# Patient Record
Sex: FEMALE | Race: WHITE | NOT HISPANIC OR LATINO | Employment: FULL TIME | URBAN - METROPOLITAN AREA
[De-identification: names, ages, dates, MRNs, and addresses within clinical notes are randomized per-mention and may not be internally consistent; named-entity substitution may affect disease eponyms.]

---

## 2017-01-17 ENCOUNTER — LAB VISIT (OUTPATIENT)
Dept: LAB | Facility: HOSPITAL | Age: 72
End: 2017-01-17
Attending: INTERNAL MEDICINE
Payer: COMMERCIAL

## 2017-01-17 ENCOUNTER — OFFICE VISIT (OUTPATIENT)
Dept: CARDIOLOGY | Facility: CLINIC | Age: 72
End: 2017-01-17
Payer: COMMERCIAL

## 2017-01-17 VITALS
SYSTOLIC BLOOD PRESSURE: 143 MMHG | WEIGHT: 186.19 LBS | BODY MASS INDEX: 31.79 KG/M2 | DIASTOLIC BLOOD PRESSURE: 84 MMHG | OXYGEN SATURATION: 97 % | HEART RATE: 77 BPM | HEIGHT: 64 IN

## 2017-01-17 DIAGNOSIS — I73.9 PAD (PERIPHERAL ARTERY DISEASE): ICD-10-CM

## 2017-01-17 DIAGNOSIS — I51.89 DIASTOLIC DYSFUNCTION: ICD-10-CM

## 2017-01-17 DIAGNOSIS — I25.10 CORONARY ARTERY DISEASE INVOLVING NATIVE CORONARY ARTERY OF NATIVE HEART WITHOUT ANGINA PECTORIS: ICD-10-CM

## 2017-01-17 DIAGNOSIS — Z95.2 S/P MVR (MITRAL VALVE REPLACEMENT): ICD-10-CM

## 2017-01-17 DIAGNOSIS — I25.10 CORONARY ARTERY DISEASE INVOLVING NATIVE CORONARY ARTERY OF NATIVE HEART WITHOUT ANGINA PECTORIS: Primary | ICD-10-CM

## 2017-01-17 DIAGNOSIS — E78.2 MIXED HYPERLIPIDEMIA: ICD-10-CM

## 2017-01-17 DIAGNOSIS — Z95.1 S/P CABG X 2: ICD-10-CM

## 2017-01-17 DIAGNOSIS — I10 ESSENTIAL HYPERTENSION: ICD-10-CM

## 2017-01-17 DIAGNOSIS — G47.33 OSA (OBSTRUCTIVE SLEEP APNEA): ICD-10-CM

## 2017-01-17 LAB
ANION GAP SERPL CALC-SCNC: 9 MMOL/L
BASOPHILS # BLD AUTO: 0.02 K/UL
BASOPHILS NFR BLD: 0.3 %
BUN SERPL-MCNC: 14 MG/DL
CALCIUM SERPL-MCNC: 9.6 MG/DL
CHLORIDE SERPL-SCNC: 93 MMOL/L
CO2 SERPL-SCNC: 29 MMOL/L
CREAT SERPL-MCNC: 0.9 MG/DL
DIFFERENTIAL METHOD: NORMAL
EOSINOPHIL # BLD AUTO: 0.1 K/UL
EOSINOPHIL NFR BLD: 1 %
ERYTHROCYTE [DISTWIDTH] IN BLOOD BY AUTOMATED COUNT: 12.2 %
EST. GFR  (AFRICAN AMERICAN): >60 ML/MIN/1.73 M^2
EST. GFR  (NON AFRICAN AMERICAN): >60 ML/MIN/1.73 M^2
GLUCOSE SERPL-MCNC: 104 MG/DL
HCT VFR BLD AUTO: 40.1 %
HGB BLD-MCNC: 14.3 G/DL
LYMPHOCYTES # BLD AUTO: 2 K/UL
LYMPHOCYTES NFR BLD: 26.7 %
MCH RBC QN AUTO: 31 PG
MCHC RBC AUTO-ENTMCNC: 35.7 %
MCV RBC AUTO: 87 FL
MONOCYTES # BLD AUTO: 0.9 K/UL
MONOCYTES NFR BLD: 11.8 %
NEUTROPHILS # BLD AUTO: 4.4 K/UL
NEUTROPHILS NFR BLD: 60.1 %
PLATELET # BLD AUTO: 184 K/UL
PMV BLD AUTO: 10.2 FL
POTASSIUM SERPL-SCNC: 3.9 MMOL/L
RBC # BLD AUTO: 4.62 M/UL
SODIUM SERPL-SCNC: 131 MMOL/L
WBC # BLD AUTO: 7.3 K/UL

## 2017-01-17 PROCEDURE — 1157F ADVNC CARE PLAN IN RCRD: CPT | Mod: S$GLB,,, | Performed by: INTERNAL MEDICINE

## 2017-01-17 PROCEDURE — 3077F SYST BP >= 140 MM HG: CPT | Mod: S$GLB,,, | Performed by: INTERNAL MEDICINE

## 2017-01-17 PROCEDURE — 1160F RVW MEDS BY RX/DR IN RCRD: CPT | Mod: S$GLB,,, | Performed by: INTERNAL MEDICINE

## 2017-01-17 PROCEDURE — 1159F MED LIST DOCD IN RCRD: CPT | Mod: S$GLB,,, | Performed by: INTERNAL MEDICINE

## 2017-01-17 PROCEDURE — 1126F AMNT PAIN NOTED NONE PRSNT: CPT | Mod: S$GLB,,, | Performed by: INTERNAL MEDICINE

## 2017-01-17 PROCEDURE — 36415 COLL VENOUS BLD VENIPUNCTURE: CPT

## 2017-01-17 PROCEDURE — 85025 COMPLETE CBC W/AUTO DIFF WBC: CPT

## 2017-01-17 PROCEDURE — 99999 PR PBB SHADOW E&M-EST. PATIENT-LVL III: CPT | Mod: PBBFAC,,, | Performed by: INTERNAL MEDICINE

## 2017-01-17 PROCEDURE — 99214 OFFICE O/P EST MOD 30 MIN: CPT | Mod: S$GLB,,, | Performed by: INTERNAL MEDICINE

## 2017-01-17 PROCEDURE — 3079F DIAST BP 80-89 MM HG: CPT | Mod: S$GLB,,, | Performed by: INTERNAL MEDICINE

## 2017-01-17 PROCEDURE — 80048 BASIC METABOLIC PNL TOTAL CA: CPT

## 2017-01-17 NOTE — MR AVS SNAPSHOT
[facility]  [address] [facility], [address]  [place] [address]  Phone: [phone]                  Tara Mondragon   2017 2:40 PM   Office Visit    Description:  Female : 1945   Provider:  iQng Duffy MD   Department:  [facility]           Reason for Visit     Follow-up           Diagnoses this Visit        Comments    Coronary artery disease involving native coronary artery of native heart without angina pectoris    -  Primary     Essential hypertension         PAD (peripheral artery disease)         Mixed hyperlipidemia         BMI 31.0-31.9,adult         S/P MVR (mitral valve replacement)         S/P CABG x 2         Diastolic dysfunction         DOV (obstructive sleep apnea)                To Do List           Goals (5 Years of Data)     None      Follow-Up and Disposition     Return in about 3 months (around 2017).    Follow-up and Disposition History      [facility] On Call     [facility] On Call Nurse Care Line -  Assistance  Registered nurses in the [facility] On Call Center provide clinical advisement, health education, appointment booking, and other advisory services.  Call for this free service at [phone].             Medications           Message regarding Medications     Verify the changes and/or additions to your medication regime listed below are the same as discussed with your clinician today.  If any of these changes or additions are incorrect, please notify your healthcare provider.             Verify that the below list of medications is an accurate representation of the medications you are currently taking.  If none reported, the list may be blank. If incorrect, please contact your healthcare provider. Carry this list with you in case of emergency.           Current Medications     amlodipine (NORVASC) 5 MG tablet TAKE 1 TABLET BY MOUTH EVERY DAY    aspirin (ECOTRIN) 81 MG EC tablet Take 1 tablet by mouth Daily.    atorvastatin (LIPITOR) 40 MG  "tablet TAKE 1 TABLET BY MOUTH EVERY DAY    ergocalciferol (ERGOCALCIFEROL) 50,000 unit Cap TAKE 1 CAPSULE BY MOUTH EVERY 7 DAYS    guaifenesin (MUCINEX) 600 mg 12 hr tablet Take 1,200 mg by mouth once as needed for Congestion.    hydrochlorothiazide (MICROZIDE) 12.5 mg capsule Take 1 capsule (12.5 mg total) by mouth once daily.    KLOR-CON 8 8 mEq TbSR TAKE 1 TABLET BY MOUTH EVERY DAY    metoprolol tartrate (LOPRESSOR) 25 MG tablet TAKE 1 TABLET BY MOUTH TWICE DAILY    nitroGLYCERIN (NITROSTAT) 0.4 MG SL tablet Place 1 tablet (0.4 mg total) under the tongue every 5 (five) minutes as needed for Chest pain.    potassium chloride (KLOR-CON) 8 MEQ TbSR TAKE 1 TABLET BY MOUTH DAILY    acetaminophen (TYLENOL) 325 MG tablet Take 325 mg by mouth once as needed for Pain.           Clinical Reference Information           Vital Signs - Last Recorded  Most recent update: 1/17/2017  2:51 PM by Adriane M Toussaint, LPN    BP Pulse Ht Wt SpO2 BMI    (!) 143/84 77 5' 4" (1.626 m) 84.5 kg (186 lb 3.2 oz) 97% 31.96 kg/m2      Blood Pressure          Most Recent Value    BP  (!)  143/84      Allergies as of 1/17/2017     Plavix [Clopidogrel]      Immunizations Administered on Date of Encounter - 1/17/2017     None      Orders Placed During Today's Visit     Future Labs/Procedures Expected by Expires    Basic metabolic panel  1/17/2017 3/18/2018    CBC auto differential  1/17/2017 3/18/2018    Polysomnogram (CPAP will be added if patient meets diagnostic criteria.)  As directed 1/17/2018      "

## 2017-01-17 NOTE — PROGRESS NOTES
Subjective:   Patient ID:  Tara Mondragon is a 71 y.o. female who presents for follow-up of Follow-up      Problem List Items Addressed This Visit        Cardiac    PAD (peripheral artery disease)    CAD (coronary artery disease) - Primary    Hypertension       Fluids/Electrolytes/Nutrition/GI    Hyperlipidemia    BMI 31.0-31.9,adult       Other    S/P MVR (mitral valve replacement)    S/P CABG x 2    Diastolic dysfunction          HPI: Patient is not doing well but unable to explain her symptoms. He said she feels congested and tired all the time. She said  never complained about her snoring but when kids visit they do mention it.   She  Is s/p CABG and MVR in 2013 after presentation with CHF and NSTEMI. She denies chest pain or SOB. She is still going to the gym 6 days weekly and quit smoking in 2013.     BP is controlled. No orthopnea or PND.   PAD is controlled with exercise regimen.     Review of Systems   Constitution: Negative.   HENT: Negative.    Eyes: Negative.    Cardiovascular: Negative.    Respiratory: Negative.    Endocrine: Negative.    Hematologic/Lymphatic: Negative.    Skin: Negative.    Musculoskeletal: Negative.    Gastrointestinal: Negative.    Neurological: Negative.        Patient's Medications   New Prescriptions    No medications on file   Previous Medications    ACETAMINOPHEN (TYLENOL) 325 MG TABLET    Take 325 mg by mouth once as needed for Pain.    AMLODIPINE (NORVASC) 5 MG TABLET    TAKE 1 TABLET BY MOUTH EVERY DAY    ASPIRIN (ECOTRIN) 81 MG EC TABLET    Take 1 tablet by mouth Daily.    ATORVASTATIN (LIPITOR) 40 MG TABLET    TAKE 1 TABLET BY MOUTH EVERY DAY    ERGOCALCIFEROL (ERGOCALCIFEROL) 50,000 UNIT CAP    TAKE 1 CAPSULE BY MOUTH EVERY 7 DAYS    GUAIFENESIN (MUCINEX) 600 MG 12 HR TABLET    Take 1,200 mg by mouth once as needed for Congestion.    HYDROCHLOROTHIAZIDE (MICROZIDE) 12.5 MG CAPSULE    Take 1 capsule (12.5 mg total) by mouth once daily.    KLOR-CON 8 8 MEQ TBSR     TAKE 1 TABLET BY MOUTH EVERY DAY    KLOR-CON 8 8 MEQ TBSR    TAKE 1 TABLET BY MOUTH EVERY DAY    METOPROLOL TARTRATE (LOPRESSOR) 25 MG TABLET    TAKE 1 TABLET BY MOUTH TWICE DAILY    METOPROLOL TARTRATE (LOPRESSOR) 25 MG TABLET    TAKE 1 TABLET BY MOUTH TWICE DAILY    METOPROLOL TARTRATE (LOPRESSOR) 25 MG TABLET    TAKE 1 TABLET BY MOUTH TWICE DAILY    NITROGLYCERIN (NITROSTAT) 0.4 MG SL TABLET    Place 1 tablet (0.4 mg total) under the tongue every 5 (five) minutes as needed for Chest pain.    POTASSIUM CHLORIDE (KLOR-CON) 8 MEQ TBSR    TAKE 1 TABLET BY MOUTH DAILY   Modified Medications    No medications on file   Discontinued Medications    No medications on file       Objective:   Physical Exam   Constitutional: She is oriented to person, place, and time. She appears well-developed and well-nourished. No distress.   Examination of the digits showed no clubbing or cyanosis   HENT:   Head: Normocephalic and atraumatic.   Eyes: Conjunctivae are normal. Pupils are equal, round, and reactive to light. Right eye exhibits no discharge.   Neck: Normal range of motion. Neck supple. No JVD present. No thyromegaly present.   No carotid bruits   Cardiovascular: Normal rate, regular rhythm, S1 normal, S2 normal, intact distal pulses and normal pulses.  PMI is not displaced.  Exam reveals no gallop, no friction rub and no opening snap.    Murmur heard.  Pulmonary/Chest: Effort normal and breath sounds normal. No respiratory distress. She has no wheezes. She has no rales. She exhibits no tenderness.   Abdominal: Soft. Bowel sounds are normal. She exhibits no distension and no mass. There is no tenderness. There is no guarding.   No hepatosplenomegaly   Musculoskeletal: Normal range of motion. She exhibits no edema or tenderness.   Lymphadenopathy:     She has no cervical adenopathy.   Neurological: She is alert and oriented to person, place, and time.   Skin: Skin is warm. No rash noted. She is not diaphoretic. No erythema.    Psychiatric: She has a normal mood and affect.   Nursing note and vitals reviewed.      ECGs reviewed  LABS reviewed  Imaging including Echoes reviewed    Assessment:     1. Coronary artery disease involving native coronary artery of native heart without angina pectoris    2. Essential hypertension    3. PAD (peripheral artery disease)    4. Mixed hyperlipidemia    5. BMI 31.0-31.9,adult    6. S/P MVR (mitral valve replacement)    7. S/P CABG x 2    8. Diastolic dysfunction        Plan:   CBC, BMP  Sleep study to DOV.  Continue current therapy  Continue exercise regimen  F/u in 3 months.

## 2017-01-18 ENCOUNTER — TELEPHONE (OUTPATIENT)
Dept: CARDIOLOGY | Facility: CLINIC | Age: 72
End: 2017-01-18

## 2017-01-18 RX ORDER — METOPROLOL TARTRATE 50 MG/1
50 TABLET ORAL 2 TIMES DAILY
Qty: 60 TABLET | Refills: 11 | Status: SHIPPED | OUTPATIENT
Start: 2017-01-18 | End: 2018-01-01 | Stop reason: SDUPTHER

## 2017-01-18 NOTE — TREATMENT PLAN
Called and inform patient of abnormal Labs which I think is secondary to HCTZ. Will stop HCTZ and increase metoprolol to 50 mg po bid.

## 2017-01-19 ENCOUNTER — TELEPHONE (OUTPATIENT)
Dept: CARDIOLOGY | Facility: CLINIC | Age: 72
End: 2017-01-19

## 2017-01-19 NOTE — TELEPHONE ENCOUNTER
----- Message from Marci Cruz sent at 1/18/2017 11:08 AM CST -----  Contact: self/[phone]  Patient is calling you back about some lab results.  Please advise

## 2017-01-22 DIAGNOSIS — E55.9 VITAMIN D DEFICIENCY: ICD-10-CM

## 2017-01-22 RX ORDER — ERGOCALCIFEROL 1.25 MG/1
CAPSULE ORAL
Qty: 4 CAPSULE | Refills: 0 | Status: SHIPPED | OUTPATIENT
Start: 2017-01-22 | End: 2017-02-16 | Stop reason: SDUPTHER

## 2017-01-26 ENCOUNTER — TELEPHONE (OUTPATIENT)
Dept: SLEEP MEDICINE | Facility: OTHER | Age: 72
End: 2017-01-26

## 2017-01-27 RX ORDER — ATORVASTATIN CALCIUM 40 MG/1
TABLET, FILM COATED ORAL
Qty: 90 TABLET | Refills: 0 | Status: SHIPPED | OUTPATIENT
Start: 2017-01-27 | End: 2017-04-23 | Stop reason: SDUPTHER

## 2017-01-31 ENCOUNTER — OFFICE VISIT (OUTPATIENT)
Dept: PODIATRY | Facility: CLINIC | Age: 72
End: 2017-01-31
Payer: COMMERCIAL

## 2017-01-31 VITALS
BODY MASS INDEX: 30.73 KG/M2 | HEART RATE: 46 BPM | WEIGHT: 180 LBS | HEIGHT: 64 IN | SYSTOLIC BLOOD PRESSURE: 150 MMHG | DIASTOLIC BLOOD PRESSURE: 72 MMHG

## 2017-01-31 DIAGNOSIS — L84 PRE-ULCERATIVE CALLUSES: ICD-10-CM

## 2017-01-31 DIAGNOSIS — M20.42 HAMMER TOE OF LEFT FOOT: ICD-10-CM

## 2017-01-31 DIAGNOSIS — I73.9 PVD (PERIPHERAL VASCULAR DISEASE): Primary | ICD-10-CM

## 2017-01-31 DIAGNOSIS — S91.109A WOUND, OPEN, TOE, INITIAL ENCOUNTER: ICD-10-CM

## 2017-01-31 PROCEDURE — 1159F MED LIST DOCD IN RCRD: CPT | Mod: S$GLB,,, | Performed by: PODIATRIST

## 2017-01-31 PROCEDURE — 3077F SYST BP >= 140 MM HG: CPT | Mod: S$GLB,,, | Performed by: PODIATRIST

## 2017-01-31 PROCEDURE — 99213 OFFICE O/P EST LOW 20 MIN: CPT | Mod: 25,S$GLB,, | Performed by: PODIATRIST

## 2017-01-31 PROCEDURE — 99999 PR PBB SHADOW E&M-EST. PATIENT-LVL III: CPT | Mod: PBBFAC,,, | Performed by: PODIATRIST

## 2017-01-31 PROCEDURE — 3078F DIAST BP <80 MM HG: CPT | Mod: S$GLB,,, | Performed by: PODIATRIST

## 2017-01-31 PROCEDURE — 11055 PARING/CUTG B9 HYPRKER LES 1: CPT | Mod: Q8,S$GLB,, | Performed by: PODIATRIST

## 2017-01-31 PROCEDURE — 1125F AMNT PAIN NOTED PAIN PRSNT: CPT | Mod: S$GLB,,, | Performed by: PODIATRIST

## 2017-01-31 PROCEDURE — 1160F RVW MEDS BY RX/DR IN RCRD: CPT | Mod: S$GLB,,, | Performed by: PODIATRIST

## 2017-01-31 PROCEDURE — 1157F ADVNC CARE PLAN IN RCRD: CPT | Mod: S$GLB,,, | Performed by: PODIATRIST

## 2017-01-31 RX ORDER — AMMONIUM LACTATE 12 G/100G
CREAM TOPICAL
Qty: 140 G | Refills: 5 | Status: SHIPPED | OUTPATIENT
Start: 2017-01-31

## 2017-01-31 NOTE — MR AVS SNAPSHOT
[facility]   [place] [address]  Phone: [phone]                  Tara Mondragon   2017 8:00 AM   Office Visit    Description:  Female : 1945   Provider:  Alcides Espinal DPM   Department:  [facility]           Reason for Visit     Foot Pain     Callouses           Diagnoses this Visit        Comments    PVD (peripheral vascular disease)    -  Primary     Pre-ulcerative calluses         Wound, open, toe, initial encounter         Hammer toe of left foot                To Do List           Future Appointments        Provider Department Dept Phone    2017 4:00 PM Qing Duffy MD [facility] [phone]      Goals (5 Years of Data)     None      Follow-Up and Disposition     Return if symptoms worsen or fail to improve.       These Medications        Disp Refills Start End    ammonium lactate 12 % Crea 140 g 5 2017     Apply to feet twice daily. Avoid use between toes.    Pharmacy: [facility] [address] [place] [address] [place], [address] AT [facility] #: [phone]         [facility] On Call     [facility] On Call Nurse Care Line -  Assistance  Registered nurses in the [facility] On Call Center provide clinical advisement, health education, appointment booking, and other advisory services.  Call for this free service at [phone].             Medications           Message regarding Medications     Verify the changes and/or additions to your medication regime listed below are the same as discussed with your clinician today.  If any of these changes or additions are incorrect, please notify your healthcare provider.        START taking these NEW medications        Refills    ammonium lactate 12 % Crea 5    Sig: Apply to feet twice daily. Avoid use between toes.    Class: Normal           Verify that the below list of medications is an accurate representation of the medications you are currently  "taking.  If none reported, the list may be blank. If incorrect, please contact your healthcare provider. Carry this list with you in case of emergency.           Current Medications     acetaminophen (TYLENOL) 325 MG tablet Take 325 mg by mouth once as needed for Pain.    amlodipine (NORVASC) 5 MG tablet TAKE 1 TABLET BY MOUTH EVERY DAY    aspirin (ECOTRIN) 81 MG EC tablet Take 1 tablet by mouth Daily.    atorvastatin (LIPITOR) 40 MG tablet TAKE 1 TABLET BY MOUTH EVERY DAY    ergocalciferol (ERGOCALCIFEROL) 50,000 unit Cap TAKE 1 CAPSULE BY MOUTH EVERY 7 DAYS    guaifenesin (MUCINEX) 600 mg 12 hr tablet Take 1,200 mg by mouth once as needed for Congestion.    KLOR-CON 8 8 mEq TbSR TAKE 1 TABLET BY MOUTH EVERY DAY    metoprolol tartrate (LOPRESSOR) 50 MG tablet Take 1 tablet (50 mg total) by mouth 2 (two) times daily.    potassium chloride (KLOR-CON) 8 MEQ TbSR TAKE 1 TABLET BY MOUTH DAILY    ammonium lactate 12 % Crea Apply to feet twice daily. Avoid use between toes.    nitroGLYCERIN (NITROSTAT) 0.4 MG SL tablet Place 1 tablet (0.4 mg total) under the tongue every 5 (five) minutes as needed for Chest pain.           Clinical Reference Information           Vital Signs - Last Recorded  Most recent update: 1/31/2017  7:37 AM by Guera Denny MA    BP Pulse Ht Wt BMI    (!) 150/72 (!) 46 5' 4" (1.626 m) 81.6 kg (180 lb) 30.9 kg/m2      Blood Pressure          Most Recent Value    BP  (!)  150/72      Allergies as of 1/31/2017     Plavix [Clopidogrel]      Immunizations Administered on Date of Encounter - 1/31/2017     None      "

## 2017-01-31 NOTE — PROGRESS NOTES
"Subjective:      Patient ID: Tara Mondragon is a 71 y.o. female.    Chief Complaint: Foot Pain (Left Foot) and Callouses (Left Foot)    C/O left fourth toe pain for past few months. Wears tennis shoes most of the time due to pain. Denies trauma. Followed per Cardiology for PVD.    Vitals:    01/31/17 0736   BP: (!) 150/72   Pulse: (!) 46   Weight: 81.6 kg (180 lb)   Height: 5' 4" (1.626 m)   PainSc: 10-Worst pain ever   PainLoc: Foot      Past Medical History   Diagnosis Date    Acute coronary syndrome     Cardiomyopathy     Coronary artery disease     Hypertension     Hyperthyroidism     Osteoporosis     PAD (peripheral artery disease)     Valvular regurgitation      Mild/Mod MR post MI       Past Surgical History   Procedure Laterality Date    Cholecystectomy      Tonsillectomy      Cardiac valve surgery       porcine MVR    Coronary artery bypass graft      Coronary angioplasty         Family History   Problem Relation Age of Onset    Heart failure Mother     Hypothyroidism Mother     Liver disease Father     Hypertension Sister     Hypothyroidism Sister     Hypertension Brother     Asthma Daughter        Social History     Social History    Marital status:      Spouse name: N/A    Number of children: N/A    Years of education: N/A     Social History Main Topics    Smoking status: Former Smoker     Packs/day: 1.00     Years: 50.00     Quit date: 2/3/2013    Smokeless tobacco: None    Alcohol use No    Drug use: No    Sexual activity: Not Asked     Other Topics Concern    None     Social History Narrative       Current Outpatient Prescriptions   Medication Sig Dispense Refill    acetaminophen (TYLENOL) 325 MG tablet Take 325 mg by mouth once as needed for Pain.      amlodipine (NORVASC) 5 MG tablet TAKE 1 TABLET BY MOUTH EVERY DAY 30 tablet 11    aspirin (ECOTRIN) 81 MG EC tablet Take 1 tablet by mouth Daily.      atorvastatin (LIPITOR) 40 MG tablet TAKE 1 TABLET BY MOUTH " EVERY DAY 90 tablet 0    ergocalciferol (ERGOCALCIFEROL) 50,000 unit Cap TAKE 1 CAPSULE BY MOUTH EVERY 7 DAYS 4 capsule 0    guaifenesin (MUCINEX) 600 mg 12 hr tablet Take 1,200 mg by mouth once as needed for Congestion.      KLOR-CON 8 8 mEq TbSR TAKE 1 TABLET BY MOUTH EVERY DAY 90 tablet 0    metoprolol tartrate (LOPRESSOR) 50 MG tablet Take 1 tablet (50 mg total) by mouth 2 (two) times daily. 60 tablet 11    potassium chloride (KLOR-CON) 8 MEQ TbSR TAKE 1 TABLET BY MOUTH DAILY 90 tablet 0    ammonium lactate 12 % Crea Apply to feet twice daily. Avoid use between toes. 140 g 5    nitroGLYCERIN (NITROSTAT) 0.4 MG SL tablet Place 1 tablet (0.4 mg total) under the tongue every 5 (five) minutes as needed for Chest pain. 60 tablet 0     No current facility-administered medications for this visit.        Review of patient's allergies indicates:   Allergen Reactions    Plavix [clopidogrel] Rash         Review of Systems   Constitution: Negative for chills, fever, weakness and malaise/fatigue.   Cardiovascular: Negative for chest pain and leg swelling.   Respiratory: Negative for cough and shortness of breath.    Skin: Positive for dry skin. Negative for itching, nail changes and rash.   Musculoskeletal: Negative for back pain, joint pain, muscle cramps and muscle weakness.   Gastrointestinal: Negative for nausea and vomiting.   Neurological: Negative for numbness and paresthesias.   Psychiatric/Behavioral: Negative for altered mental status.           Objective:      Physical Exam   Constitutional: She is oriented to person, place, and time. No distress.   Cardiovascular:   Pulses:       Dorsalis pedis pulses are 1+ on the right side, and 1+ on the left side.        Posterior tibial pulses are 1+ on the right side, and 1+ on the left side.   CFT< 3 secs all toes bilateral foot, skin temp warm bilateral foot,nol hair growth bilateral lower extremity, mild non-pitting lower extremity edema bilateral.      Musculoskeletal:   + equinus that reduces with knee bent bilateral.    Reducible adductovarus contracture of left fourth toe that is more plantar flexed then surrounding toes. Adductovarus contracture fifth toe bilateral foot.    Mild pes planus foot type bilateral foot.    No pain with ROM or MMT bilateral foot.   Neurological: She is alert and oriented to person, place, and time. She has normal strength. Gait normal.   Skin: Skin is warm and dry. No ecchymosis and no rash noted. She is not diaphoretic. No cyanosis or erythema. No pallor. Nails show no clubbing.   Hyperkeratotic lesion distal lateral left fourth toe digital pulp with underlying superficial pin point wound post debridement. + scant purulent drainage without surrounding erythema or edema, no odor, does not probe or undermine.    Mild pinch callus right fifth toe.    Skin is dry bilateral foot.              Assessment:       Encounter Diagnoses   Name Primary?    PVD (peripheral vascular disease) Yes    Pre-ulcerative calluses     Wound, open, toe, initial encounter     Hammer toe of left foot          Plan:       Tara was seen today for foot pain and callouses.    Diagnoses and all orders for this visit:    PVD (peripheral vascular disease)    Pre-ulcerative calluses    Wound, open, toe, initial encounter    Hammer toe of left foot    Other orders  -     ammonium lactate 12 % Crea; Apply to feet twice daily. Avoid use between toes.      I counseled the patient on her conditions, their implications and medical management.    Discussed conservative vs surgical treatment options for painful hammertoes in detail outlining risks and benefits of each. With patient's verbal consent a sterile #15 scalpel was utilized to trim the hyperkeratotic lesion described above to healthy appearing skin noting small pin point wound. Applied bacitracin, gauze and coban. Home care instructions reviewed in detail.  Patient tolerated the procedure well without  complication and noted improvement in symptoms. No anesthesia or hemostasis necessary. No blood loss.    Will consider percutaneous flexor tenotomy left fourth toe.    RTC prn.      .

## 2017-02-09 ENCOUNTER — PATIENT MESSAGE (OUTPATIENT)
Dept: ADMINISTRATIVE | Facility: OTHER | Age: 72
End: 2017-02-09

## 2017-02-09 ENCOUNTER — TELEPHONE (OUTPATIENT)
Dept: SLEEP MEDICINE | Facility: OTHER | Age: 72
End: 2017-02-09

## 2017-02-09 NOTE — TELEPHONE ENCOUNTER
Patient canceled her sleep study for March 2nd.  She did let us know she would call when she's able to reschedule.  I sent a reminder message to schedule when able,through my [employer].

## 2017-02-16 DIAGNOSIS — E55.9 VITAMIN D DEFICIENCY: ICD-10-CM

## 2017-02-16 RX ORDER — ERGOCALCIFEROL 1.25 MG/1
CAPSULE ORAL
Qty: 4 CAPSULE | Refills: 0 | Status: SHIPPED | OUTPATIENT
Start: 2017-02-16 | End: 2017-03-16 | Stop reason: SDUPTHER

## 2017-02-20 ENCOUNTER — TELEPHONE (OUTPATIENT)
Dept: SLEEP MEDICINE | Facility: OTHER | Age: 72
End: 2017-02-20

## 2017-02-24 ENCOUNTER — TELEPHONE (OUTPATIENT)
Dept: SLEEP MEDICINE | Facility: OTHER | Age: 72
End: 2017-02-24

## 2017-02-24 RX ORDER — POTASSIUM CHLORIDE 600 MG/1
TABLET, FILM COATED, EXTENDED RELEASE ORAL
Qty: 90 TABLET | Refills: 0 | Status: SHIPPED | OUTPATIENT
Start: 2017-02-24 | End: 2017-04-18 | Stop reason: SDUPTHER

## 2017-03-02 ENCOUNTER — TELEPHONE (OUTPATIENT)
Dept: SLEEP MEDICINE | Facility: OTHER | Age: 72
End: 2017-03-02

## 2017-03-16 DIAGNOSIS — E55.9 VITAMIN D DEFICIENCY: ICD-10-CM

## 2017-03-16 RX ORDER — ERGOCALCIFEROL 1.25 MG/1
CAPSULE ORAL
Qty: 4 CAPSULE | Refills: 0 | Status: SHIPPED | OUTPATIENT
Start: 2017-03-16 | End: 2017-04-12 | Stop reason: SDUPTHER

## 2017-03-20 ENCOUNTER — TELEPHONE (OUTPATIENT)
Dept: SLEEP MEDICINE | Facility: OTHER | Age: 72
End: 2017-03-20

## 2017-03-20 NOTE — TELEPHONE ENCOUNTER
Patient not ready to schedule for sometime.  She will call us when she's ready to schedule her sleep study.

## 2017-03-27 DIAGNOSIS — I10 ESSENTIAL HYPERTENSION: ICD-10-CM

## 2017-03-27 RX ORDER — AMLODIPINE BESYLATE 5 MG/1
TABLET ORAL
Qty: 30 TABLET | Refills: 0 | Status: SHIPPED | OUTPATIENT
Start: 2017-03-27 | End: 2018-04-12 | Stop reason: SDUPTHER

## 2017-04-12 DIAGNOSIS — E55.9 VITAMIN D DEFICIENCY: ICD-10-CM

## 2017-04-12 RX ORDER — ERGOCALCIFEROL 1.25 MG/1
CAPSULE ORAL
Qty: 4 CAPSULE | Refills: 0 | Status: SHIPPED | OUTPATIENT
Start: 2017-04-12 | End: 2017-05-07 | Stop reason: SDUPTHER

## 2017-04-18 ENCOUNTER — OFFICE VISIT (OUTPATIENT)
Dept: CARDIOLOGY | Facility: CLINIC | Age: 72
End: 2017-04-18
Payer: COMMERCIAL

## 2017-04-18 VITALS
HEART RATE: 69 BPM | DIASTOLIC BLOOD PRESSURE: 76 MMHG | OXYGEN SATURATION: 98 % | HEIGHT: 64 IN | BODY MASS INDEX: 33.66 KG/M2 | WEIGHT: 197.13 LBS | SYSTOLIC BLOOD PRESSURE: 171 MMHG

## 2017-04-18 DIAGNOSIS — Z95.2 S/P MVR (MITRAL VALVE REPLACEMENT): ICD-10-CM

## 2017-04-18 DIAGNOSIS — E78.2 MIXED HYPERLIPIDEMIA: ICD-10-CM

## 2017-04-18 DIAGNOSIS — I10 ESSENTIAL HYPERTENSION: ICD-10-CM

## 2017-04-18 DIAGNOSIS — I25.10 CORONARY ARTERY DISEASE INVOLVING NATIVE CORONARY ARTERY OF NATIVE HEART WITHOUT ANGINA PECTORIS: Primary | ICD-10-CM

## 2017-04-18 DIAGNOSIS — Z95.1 S/P CABG X 2: ICD-10-CM

## 2017-04-18 DIAGNOSIS — I51.89 DIASTOLIC DYSFUNCTION: ICD-10-CM

## 2017-04-18 PROCEDURE — 3077F SYST BP >= 140 MM HG: CPT | Mod: S$GLB,,, | Performed by: INTERNAL MEDICINE

## 2017-04-18 PROCEDURE — 1126F AMNT PAIN NOTED NONE PRSNT: CPT | Mod: S$GLB,,, | Performed by: INTERNAL MEDICINE

## 2017-04-18 PROCEDURE — 1160F RVW MEDS BY RX/DR IN RCRD: CPT | Mod: S$GLB,,, | Performed by: INTERNAL MEDICINE

## 2017-04-18 PROCEDURE — 3078F DIAST BP <80 MM HG: CPT | Mod: S$GLB,,, | Performed by: INTERNAL MEDICINE

## 2017-04-18 PROCEDURE — 99214 OFFICE O/P EST MOD 30 MIN: CPT | Mod: S$GLB,,, | Performed by: INTERNAL MEDICINE

## 2017-04-18 PROCEDURE — 1159F MED LIST DOCD IN RCRD: CPT | Mod: S$GLB,,, | Performed by: INTERNAL MEDICINE

## 2017-04-18 PROCEDURE — 99999 PR PBB SHADOW E&M-EST. PATIENT-LVL III: CPT | Mod: PBBFAC,,, | Performed by: INTERNAL MEDICINE

## 2017-04-18 PROCEDURE — 1157F ADVNC CARE PLAN IN RCRD: CPT | Mod: 8P,S$GLB,, | Performed by: INTERNAL MEDICINE

## 2017-04-18 RX ORDER — SPIRONOLACTONE 25 MG/1
25 TABLET ORAL DAILY
Qty: 30 TABLET | Refills: 11 | Status: SHIPPED | OUTPATIENT
Start: 2017-04-18 | End: 2018-04-02 | Stop reason: SDUPTHER

## 2017-04-18 NOTE — PROGRESS NOTES
Subjective:   Patient ID:  Tara Mondragon is a 71 y.o. female who presents for follow-up of Foot Swelling      Problem List Items Addressed This Visit        Cardiac    CAD (coronary artery disease) - Primary    Hypertension       Fluids/Electrolytes/Nutrition/GI    Hyperlipidemia       Other    S/P MVR (mitral valve replacement)    S/P CABG x 2    Diastolic dysfunction          HPI: Patient is not doing well with no acute complaints. She has gained weight since previous visit and BP is elevated. She is compliant with medications. HCTZ was discontinue secondary to hyponatremia. She is now complaining of leg swelling. She is also on norvasc.     No chest pain or dyspnea.     She  Is s/p CABG and MVR in 2013 after presentation with CHF and NSTEMI. She is still going to the gym 6 days weekly and quit smoking in 2013.     No orthopnea or PND    PAD is controlled with exercise regimen.     Review of Systems   Constitution: Negative.   HENT: Negative.    Eyes: Negative.    Cardiovascular: Negative.    Respiratory: Negative.    Endocrine: Negative.    Hematologic/Lymphatic: Negative.    Skin: Negative.    Musculoskeletal: Negative.    Gastrointestinal: Negative.    Neurological: Negative.        Patient's Medications   New Prescriptions    No medications on file   Previous Medications    ACETAMINOPHEN (TYLENOL) 325 MG TABLET    Take 325 mg by mouth once as needed for Pain.    AMLODIPINE (NORVASC) 5 MG TABLET    TAKE 1 TABLET BY MOUTH EVERY DAY    AMMONIUM LACTATE 12 % CREA    Apply to feet twice daily. Avoid use between toes.    ASPIRIN (ECOTRIN) 81 MG EC TABLET    Take 1 tablet by mouth Daily.    ATORVASTATIN (LIPITOR) 40 MG TABLET    TAKE 1 TABLET BY MOUTH EVERY DAY    ERGOCALCIFEROL (ERGOCALCIFEROL) 50,000 UNIT CAP    TAKE 1 CAPSULE BY MOUTH EVERY 7 DAYS    GUAIFENESIN (MUCINEX) 600 MG 12 HR TABLET    Take 1,200 mg by mouth once as needed for Congestion.    METOPROLOL TARTRATE (LOPRESSOR) 50 MG TABLET    Take 1 tablet  (50 mg total) by mouth 2 (two) times daily.    NITROGLYCERIN (NITROSTAT) 0.4 MG SL TABLET    Place 1 tablet (0.4 mg total) under the tongue every 5 (five) minutes as needed for Chest pain.    POTASSIUM CHLORIDE (KLOR-CON) 8 MEQ TBSR    TAKE 1 TABLET BY MOUTH DAILY   Modified Medications    No medications on file   Discontinued Medications    KLOR-CON 8 8 MEQ TBSR    TAKE 1 TABLET BY MOUTH EVERY DAY    KLOR-CON 8 8 MEQ TBSR    TAKE 1 TABLET BY MOUTH EVERY DAY       Objective:   Physical Exam   Constitutional: She is oriented to person, place, and time. She appears well-developed and well-nourished. No distress.   Examination of the digits showed no clubbing or cyanosis   HENT:   Head: Normocephalic and atraumatic.   Eyes: Conjunctivae are normal. Pupils are equal, round, and reactive to light. Right eye exhibits no discharge.   Neck: Normal range of motion. Neck supple. No JVD present. No thyromegaly present.   No carotid bruits   Cardiovascular: Normal rate, regular rhythm, S1 normal, S2 normal, intact distal pulses and normal pulses.  PMI is not displaced.  Exam reveals no gallop, no friction rub and no opening snap.    Murmur heard.  Pulmonary/Chest: Effort normal and breath sounds normal. No respiratory distress. She has no wheezes. She has no rales. She exhibits no tenderness.   Abdominal: Soft. Bowel sounds are normal. She exhibits no distension and no mass. There is no tenderness. There is no guarding.   No hepatosplenomegaly   Musculoskeletal: Normal range of motion. She exhibits no edema or tenderness.   Lymphadenopathy:     She has no cervical adenopathy.   Neurological: She is alert and oriented to person, place, and time.   Skin: Skin is warm. No rash noted. She is not diaphoretic. No erythema.   Psychiatric: She has a normal mood and affect.   Nursing note and vitals reviewed.      ECGs reviewed  LABS reviewed  Imaging including Echoes reviewed    Assessment:     1. Coronary artery disease involving  native coronary artery of native heart without angina pectoris    2. Diastolic dysfunction    3. S/P CABG x 2    4. S/P MVR (mitral valve replacement)    5. Mixed hyperlipidemia    6. Essential hypertension        Plan:     Start aldactone 25 mg po daily for HTN and mild leg edema from norvasc  Continue current therapy  Continue exercise regimen  F/u in 3 months. BMP on f/u

## 2017-04-18 NOTE — MR AVS SNAPSHOT
[facility]  [address], [address]  [place] [address]  Phone: [phone]                  Tara Mondragon   2017 4:00 PM   Office Visit    Description:  Female : 1945   Provider:  Qing Duffy MD   Department:  [facility]           Reason for Visit     Foot Swelling           Diagnoses this Visit        Comments    Coronary artery disease involving native coronary artery of native heart without angina pectoris    -  Primary     Diastolic dysfunction         S/P CABG x 2         S/P MVR (mitral valve replacement)         Mixed hyperlipidemia         Essential hypertension                To Do List           Goals (5 Years of Data)     None       These Medications        Disp Refills Start End    spironolactone (ALDACTONE) 25 MG tablet 30 tablet 11 2017    Take 1 tablet (25 mg total) by mouth once daily. - Oral    Pharmacy: [facility] [address] [place] [address] ESTELA CHRIS [address] AT [facility] #: [phone]         [facility]     [facility] Nurse Care Line -  Assistance  Unless otherwise directed by your provider, please contact [facility], our nurse care line that is available for  assistance.     Registered nurses in the [facility] provide: appointment scheduling, clinical advisement, health education, and other advisory services.  Call: [phone] (toll free)               Medications           Message regarding Medications     Verify the changes and/or additions to your medication regime listed below are the same as discussed with your clinician today.  If any of these changes or additions are incorrect, please notify your healthcare provider.        START taking these NEW medications        Refills    spironolactone (ALDACTONE) 25 MG tablet 11    Sig: Take 1 tablet (25 mg total) by mouth once daily.    Class: Normal    Route: Oral           Verify that the below list  "of medications is an accurate representation of the medications you are currently taking.  If none reported, the list may be blank. If incorrect, please contact your healthcare provider. Carry this list with you in case of emergency.           Current Medications     acetaminophen (TYLENOL) 325 MG tablet Take 325 mg by mouth once as needed for Pain.    amlodipine (NORVASC) 5 MG tablet TAKE 1 TABLET BY MOUTH EVERY DAY    ammonium lactate 12 % Crea Apply to feet twice daily. Avoid use between toes.    aspirin (ECOTRIN) 81 MG EC tablet Take 1 tablet by mouth Daily.    atorvastatin (LIPITOR) 40 MG tablet TAKE 1 TABLET BY MOUTH EVERY DAY    ergocalciferol (ERGOCALCIFEROL) 50,000 unit Cap TAKE 1 CAPSULE BY MOUTH EVERY 7 DAYS    guaifenesin (MUCINEX) 600 mg 12 hr tablet Take 1,200 mg by mouth once as needed for Congestion.    metoprolol tartrate (LOPRESSOR) 50 MG tablet Take 1 tablet (50 mg total) by mouth 2 (two) times daily.    nitroGLYCERIN (NITROSTAT) 0.4 MG SL tablet Place 1 tablet (0.4 mg total) under the tongue every 5 (five) minutes as needed for Chest pain.    potassium chloride (KLOR-CON) 8 MEQ TbSR TAKE 1 TABLET BY MOUTH DAILY    spironolactone (ALDACTONE) 25 MG tablet Take 1 tablet (25 mg total) by mouth once daily.           Clinical Reference Information           Your Vitals Were     BP Pulse Height Weight SpO2 BMI    171/76 (BP Location: Left arm, Patient Position: Sitting, BP Method: Automatic) 69 5' 4" (1.626 m) 89.4 kg (197 lb 1.6 oz) 98% 33.83 kg/m2      Blood Pressure          Most Recent Value    Right Arm BP - Sitting  161/80    Right Arm BP - Standing  149/80    Left Arm BP - Sitting  171/76    BP  (!)  171/76      Allergies as of 4/18/2017     Plavix [Clopidogrel]      Immunizations Administered on Date of Encounter - 4/18/2017     None      Language Assistance Services     ATTENTION: Language assistance services are available, free of charge. Please call [phone].      ATENCIÓN: Rachael cook " español, tiene a fregoso disposición servicios gratuitos de asistencia lingüística. Deisy al [phone].     CHRISTINE Ý: N?u b?n nói Ti?ng Vi?t, có các d?ch v? h? tr? ngôn ng? mi?n phí dành cho b?n. G?i s? [phone].         [facility] complies with applicable Federal civil rights laws and does not discriminate on the basis of race, color, national origin, age, disability, or sex.

## 2017-04-23 DIAGNOSIS — I10 ESSENTIAL HYPERTENSION: ICD-10-CM

## 2017-04-23 RX ORDER — ATORVASTATIN CALCIUM 40 MG/1
TABLET, FILM COATED ORAL
Qty: 90 TABLET | Refills: 0 | Status: SHIPPED | OUTPATIENT
Start: 2017-04-23 | End: 2017-07-17 | Stop reason: SDUPTHER

## 2017-04-23 RX ORDER — AMLODIPINE BESYLATE 5 MG/1
TABLET ORAL
Qty: 30 TABLET | Refills: 0 | Status: SHIPPED | OUTPATIENT
Start: 2017-04-23 | End: 2017-05-25 | Stop reason: SDUPTHER

## 2017-05-07 DIAGNOSIS — E55.9 VITAMIN D DEFICIENCY: ICD-10-CM

## 2017-05-07 RX ORDER — ERGOCALCIFEROL 1.25 MG/1
CAPSULE ORAL
Qty: 4 CAPSULE | Refills: 0 | Status: SHIPPED | OUTPATIENT
Start: 2017-05-07 | End: 2017-06-01 | Stop reason: SDUPTHER

## 2017-05-20 DIAGNOSIS — I10 ESSENTIAL HYPERTENSION: ICD-10-CM

## 2017-05-20 RX ORDER — AMLODIPINE BESYLATE 5 MG/1
TABLET ORAL
Qty: 30 TABLET | Refills: 0 | Status: SHIPPED | OUTPATIENT
Start: 2017-05-20 | End: 2017-05-25 | Stop reason: SDUPTHER

## 2017-05-20 RX ORDER — POTASSIUM CHLORIDE 600 MG/1
TABLET, FILM COATED, EXTENDED RELEASE ORAL
Qty: 90 TABLET | Refills: 0 | Status: SHIPPED | OUTPATIENT
Start: 2017-05-20 | End: 2017-05-25 | Stop reason: SDUPTHER

## 2017-05-25 ENCOUNTER — OFFICE VISIT (OUTPATIENT)
Dept: FAMILY MEDICINE | Facility: CLINIC | Age: 72
End: 2017-05-25
Payer: COMMERCIAL

## 2017-05-25 VITALS
OXYGEN SATURATION: 95 % | HEART RATE: 69 BPM | BODY MASS INDEX: 32.06 KG/M2 | HEIGHT: 64 IN | WEIGHT: 187.81 LBS | SYSTOLIC BLOOD PRESSURE: 124 MMHG | DIASTOLIC BLOOD PRESSURE: 68 MMHG

## 2017-05-25 DIAGNOSIS — E66.9 OBESITY (BMI 30.0-34.9): ICD-10-CM

## 2017-05-25 DIAGNOSIS — Z00.00 ROUTINE GENERAL MEDICAL EXAMINATION AT HEALTH CARE FACILITY: Primary | ICD-10-CM

## 2017-05-25 DIAGNOSIS — I73.9 PAD (PERIPHERAL ARTERY DISEASE): ICD-10-CM

## 2017-05-25 DIAGNOSIS — Z12.31 ENCOUNTER FOR SCREENING MAMMOGRAM FOR BREAST CANCER: ICD-10-CM

## 2017-05-25 DIAGNOSIS — I10 ESSENTIAL HYPERTENSION: ICD-10-CM

## 2017-05-25 DIAGNOSIS — I25.10 CORONARY ARTERY DISEASE INVOLVING NATIVE CORONARY ARTERY OF NATIVE HEART WITHOUT ANGINA PECTORIS: ICD-10-CM

## 2017-05-25 DIAGNOSIS — E78.2 MIXED HYPERLIPIDEMIA: ICD-10-CM

## 2017-05-25 DIAGNOSIS — E05.90 HYPERTHYROIDISM: ICD-10-CM

## 2017-05-25 DIAGNOSIS — I51.89 LEFT VENTRICULAR DIASTOLIC DYSFUNCTION, NYHA CLASS 3: ICD-10-CM

## 2017-05-25 DIAGNOSIS — I51.89 DIASTOLIC DYSFUNCTION: ICD-10-CM

## 2017-05-25 PROCEDURE — 99999 PR PBB SHADOW E&M-EST. PATIENT-LVL III: CPT | Mod: PBBFAC,,, | Performed by: FAMILY MEDICINE

## 2017-05-25 PROCEDURE — 99397 PER PM REEVAL EST PAT 65+ YR: CPT | Mod: S$GLB,,, | Performed by: FAMILY MEDICINE

## 2017-05-25 NOTE — PROGRESS NOTES
Subjective:       Patient ID: Tara Mondragon is a 71 y.o. female.    Chief Complaint: Hypertension    71 yr old pleasant white female with CAD, HTN, HLD, PAD, MVR s/p porcine valve replacement, Hyperthyroidism presents today for her routine follow up and annual wellness check and lab work. C/o fatigue and sleepiness and want to check her hormone levels. No anxiety/depression.    HTN - controlled - on metoprolol and norvasc - denies any side effects    HLD - on statin - controlled - LDLCALC                  53.6*               09/30/2014          - denies any side effects    CAD/PAD/MVR/SP CABG - follows Cardiology and currently doing Cardiac Rehab    Hyperthyroidism - used to follow endocrine and off of medications - last TSh was normal    Health maintenance  -Colonoscopy due - declines  -Mammo due  -Pneumovax up to date  -prevnar UTD  -labs due      Medication Refill   Pertinent negatives include no abdominal pain, anorexia, arthralgias, change in bowel habit, chest pain, congestion, coughing, diaphoresis, fatigue, headaches, joint swelling, myalgias, nausea, neck pain, numbness, sore throat, urinary symptoms, visual change or weakness.   Hypertension   This is a chronic problem. The current episode started more than 1 month ago. The problem has been gradually improving since onset. The problem is controlled. Pertinent negatives include no chest pain, headaches, neck pain, palpitations or shortness of breath. There are no associated agents to hypertension. Risk factors for coronary artery disease include obesity and dyslipidemia. Past treatments include calcium channel blockers and diuretics. The current treatment provides moderate improvement. There are no compliance problems.  Hypertensive end-organ damage includes CAD/MI and a thyroid problem. There is no history of CVA, left ventricular hypertrophy, PVD, renovascular disease or retinopathy. There is no history of chronic renal disease, coarctation of the aorta,  hypercortisolism, hyperparathyroidism or pheochromocytoma.   Thyroid Problem   Presents for follow-up visit. Patient reports no anxiety, cold intolerance, constipation, diaphoresis, diarrhea, fatigue, heat intolerance, menstrual problem, palpitations, tremors or visual change. The symptoms have been stable. Past treatments include nothing. The treatment provided moderate relief. The following procedures have not been performed: thyroid FNA and thyroid ultrasound. Her past medical history is significant for hyperlipidemia. There is no history of diabetes. There are no known risk factors.   Hyperlipidemia   This is a chronic problem. The current episode started more than 1 year ago. The problem is controlled. Recent lipid tests were reviewed and are normal. Exacerbating diseases include obesity. She has no history of chronic renal disease, diabetes, hypothyroidism or liver disease. There are no known factors aggravating her hyperlipidemia. Pertinent negatives include no chest pain, myalgias or shortness of breath. Current antihyperlipidemic treatment includes statins. The current treatment provides significant improvement of lipids. There are no compliance problems.  Risk factors for coronary artery disease include dyslipidemia, hypertension, obesity and post-menopausal.     Review of Systems   Constitutional: Negative.  Negative for activity change, diaphoresis, fatigue and unexpected weight change.   HENT: Negative.  Negative for congestion, ear discharge, hearing loss, rhinorrhea, sore throat and voice change.    Eyes: Negative.  Negative for pain, discharge and visual disturbance.   Respiratory: Negative.  Negative for cough, chest tightness, shortness of breath and wheezing.    Cardiovascular: Negative.  Negative for chest pain and palpitations.   Gastrointestinal: Negative.  Negative for abdominal distention, abdominal pain, anal bleeding, anorexia, change in bowel habit, constipation, diarrhea and nausea.    Endocrine: Negative.  Negative for cold intolerance, heat intolerance, polydipsia and polyuria.   Genitourinary: Negative.  Negative for decreased urine volume, difficulty urinating, dysuria, frequency, menstrual problem and vaginal pain.   Musculoskeletal: Negative.  Negative for arthralgias, gait problem, joint swelling, myalgias and neck pain.   Skin: Negative.  Negative for color change, pallor and wound.   Allergic/Immunologic: Negative.  Negative for environmental allergies and immunocompromised state.   Neurological: Negative.  Negative for dizziness, tremors, seizures, speech difficulty, weakness, numbness and headaches.   Hematological: Negative.  Negative for adenopathy. Does not bruise/bleed easily.   Psychiatric/Behavioral: Negative.  Negative for agitation, confusion, decreased concentration, hallucinations, self-injury and suicidal ideas. The patient is not nervous/anxious.        PMH/PSH/FH/SH/MED/ALLERGY reviewed    Objective:       Vitals:    05/25/17 1504   BP: 124/68   Pulse: 69       Physical Exam   Constitutional: She is oriented to person, place, and time. She appears well-developed and well-nourished. No distress.   HENT:   Head: Normocephalic and atraumatic.   Right Ear: External ear normal.   Left Ear: External ear normal.   Nose: Nose normal.   Mouth/Throat: Oropharynx is clear and moist. No oropharyngeal exudate.   Eyes: Conjunctivae and EOM are normal. Pupils are equal, round, and reactive to light. Right eye exhibits no discharge. Left eye exhibits no discharge. No scleral icterus.   Neck: Normal range of motion. Neck supple. No JVD present. No tracheal deviation present. No thyromegaly present.   Cardiovascular: Normal rate, regular rhythm and intact distal pulses.  Exam reveals no gallop and no friction rub.    Murmur (2/6 systolic murmer best heard in aortic area) heard.  Pulmonary/Chest: Effort normal and breath sounds normal. No stridor. No respiratory distress. She has no  wheezes. She has no rales. She exhibits no tenderness.   Abdominal: Soft. Bowel sounds are normal. She exhibits no distension and no mass. There is no tenderness. There is no rebound and no guarding. No hernia.   Musculoskeletal: Normal range of motion. She exhibits no edema or tenderness.   Lymphadenopathy:     She has no cervical adenopathy.   Neurological: She is alert and oriented to person, place, and time. She has normal reflexes. She displays normal reflexes. No cranial nerve deficit. She exhibits normal muscle tone. Coordination normal.   Skin: Skin is warm and dry. No rash noted. She is not diaphoretic. No erythema. No pallor.   Psychiatric: She has a normal mood and affect. Her behavior is normal. Judgment and thought content normal.       Assessment:       1. Routine general medical examination at health care facility    2. Mixed hyperlipidemia    3. Essential hypertension    4. PAD (peripheral artery disease)    5. Coronary artery disease involving native coronary artery of native heart without angina pectoris    6. Hyperthyroidism    7. Diastolic dysfunction    8. Left ventricular diastolic dysfunction, NYHA class 3    9. Obesity (BMI 30.0-34.9)    10. Encounter for screening mammogram for breast cancer        Plan:       Tara was seen today for hypertension.    Diagnoses and all orders for this visit:    Routine general medical examination at health care facility    Mixed hyperlipidemia  -     CBC auto differential; Future  -     Comprehensive metabolic panel; Future  -     Lipid panel; Future  -     Vitamin D; Future    Essential hypertension  -     CBC auto differential; Future  -     Comprehensive metabolic panel; Future  -     Lipid panel; Future  -     Vitamin D; Future    PAD (peripheral artery disease)  -     CBC auto differential; Future  -     Comprehensive metabolic panel; Future  -     Lipid panel; Future  -     Vitamin D; Future    Coronary artery disease involving native coronary artery  of native heart without angina pectoris  -     CBC auto differential; Future  -     Comprehensive metabolic panel; Future  -     Lipid panel; Future  -     Vitamin D; Future    Hyperthyroidism  -     TSH; Future    Diastolic dysfunction    Left ventricular diastolic dysfunction, NYHA class 3    Obesity (BMI 30.0-34.9)    Encounter for screening mammogram for breast cancer  -     Mammo Digital Screening Bilat with Tomosynthesis CAD; Future      Wellness check  -normal exam  -labs      She declines colonoscopy        HTN/HLD/hyperthyroidism - controlled    CAD and PVD - stable - follows cardiology    Obesity - diet and exercise    Fatigue and hyperthyroidism - labs    Spent adequate time in obtaining history and explaining differentials    40 minutes spent during this visit of which greater than 50% devoted to face-face counseling and coordination of care regarding diagnosis and management plan    Return in about 1 year (around 5/25/2018), or if symptoms worsen or fail to improve.

## 2017-05-27 ENCOUNTER — HOSPITAL ENCOUNTER (OUTPATIENT)
Dept: RADIOLOGY | Facility: HOSPITAL | Age: 72
Discharge: HOME | End: 2017-05-27
Attending: FAMILY MEDICINE
Payer: COMMERCIAL

## 2017-05-27 DIAGNOSIS — Z12.31 ENCOUNTER FOR SCREENING MAMMOGRAM FOR BREAST CANCER: ICD-10-CM

## 2017-05-27 PROCEDURE — 77067 SCR MAMMO BI INCL CAD: CPT | Mod: 26,,, | Performed by: RADIOLOGY

## 2017-05-27 PROCEDURE — 77063 BREAST TOMOSYNTHESIS BI: CPT | Mod: 26,,, | Performed by: RADIOLOGY

## 2017-05-27 PROCEDURE — 77067 SCR MAMMO BI INCL CAD: CPT | Mod: TC

## 2017-06-01 DIAGNOSIS — E55.9 VITAMIN D DEFICIENCY: ICD-10-CM

## 2017-06-01 RX ORDER — ERGOCALCIFEROL 1.25 MG/1
50000 CAPSULE ORAL
Qty: 4 CAPSULE | Refills: 11 | Status: SHIPPED | OUTPATIENT
Start: 2017-06-01 | End: 2018-05-30 | Stop reason: SDUPTHER

## 2017-06-16 DIAGNOSIS — I10 ESSENTIAL HYPERTENSION: ICD-10-CM

## 2017-06-16 RX ORDER — AMLODIPINE BESYLATE 5 MG/1
TABLET ORAL
Qty: 30 TABLET | Refills: 0 | Status: SHIPPED | OUTPATIENT
Start: 2017-06-16 | End: 2017-09-22 | Stop reason: SDUPTHER

## 2017-07-16 DIAGNOSIS — I10 ESSENTIAL HYPERTENSION: ICD-10-CM

## 2017-07-16 DIAGNOSIS — I25.10 CORONARY ARTERY DISEASE INVOLVING NATIVE CORONARY ARTERY OF NATIVE HEART WITHOUT ANGINA PECTORIS: Primary | ICD-10-CM

## 2017-07-17 RX ORDER — ATORVASTATIN CALCIUM 40 MG/1
40 TABLET, FILM COATED ORAL DAILY
Qty: 90 TABLET | Refills: 3 | Status: SHIPPED | OUTPATIENT
Start: 2017-07-17 | End: 2018-07-20 | Stop reason: SDUPTHER

## 2017-07-17 RX ORDER — AMLODIPINE BESYLATE 5 MG/1
TABLET ORAL
Qty: 90 TABLET | Refills: 0 | Status: SHIPPED | OUTPATIENT
Start: 2017-07-17 | End: 2017-09-22 | Stop reason: SDUPTHER

## 2017-07-17 NOTE — TELEPHONE ENCOUNTER
----- Message from Isadora Mitchell sent at 7/17/2017  9:16 AM CDT -----  Contact: lara [phone]  The pharm. Is requesting a refill on lipitor, she states the escribed failed

## 2017-08-18 RX ORDER — POTASSIUM CHLORIDE 600 MG/1
TABLET, FILM COATED, EXTENDED RELEASE ORAL
Qty: 90 TABLET | Refills: 0 | Status: SHIPPED | OUTPATIENT
Start: 2017-08-18 | End: 2017-09-22 | Stop reason: SDUPTHER

## 2017-09-20 ENCOUNTER — TELEPHONE (OUTPATIENT)
Dept: FAMILY MEDICINE | Facility: CLINIC | Age: 72
End: 2017-09-20

## 2017-09-22 ENCOUNTER — LAB VISIT (OUTPATIENT)
Dept: LAB | Facility: HOSPITAL | Age: 72
End: 2017-09-22
Attending: FAMILY MEDICINE
Payer: COMMERCIAL

## 2017-09-22 ENCOUNTER — OFFICE VISIT (OUTPATIENT)
Dept: FAMILY MEDICINE | Facility: CLINIC | Age: 72
End: 2017-09-22
Attending: FAMILY MEDICINE
Payer: COMMERCIAL

## 2017-09-22 VITALS
DIASTOLIC BLOOD PRESSURE: 65 MMHG | BODY MASS INDEX: 32.33 KG/M2 | SYSTOLIC BLOOD PRESSURE: 134 MMHG | HEIGHT: 64 IN | HEART RATE: 59 BPM | WEIGHT: 189.38 LBS | OXYGEN SATURATION: 96 %

## 2017-09-22 DIAGNOSIS — E87.1 HYPONATREMIA: ICD-10-CM

## 2017-09-22 DIAGNOSIS — Z12.11 COLON CANCER SCREENING: ICD-10-CM

## 2017-09-22 DIAGNOSIS — I10 ESSENTIAL HYPERTENSION: ICD-10-CM

## 2017-09-22 DIAGNOSIS — N30.00 ACUTE CYSTITIS WITHOUT HEMATURIA: Primary | ICD-10-CM

## 2017-09-22 LAB
ALBUMIN SERPL BCP-MCNC: 4 G/DL
ALP SERPL-CCNC: 111 U/L
ALT SERPL W/O P-5'-P-CCNC: 23 U/L
ANION GAP SERPL CALC-SCNC: 9 MMOL/L
AST SERPL-CCNC: 22 U/L
BILIRUB SERPL-MCNC: 0.7 MG/DL
BUN SERPL-MCNC: 23 MG/DL
CALCIUM SERPL-MCNC: 9.8 MG/DL
CHLORIDE SERPL-SCNC: 96 MMOL/L
CO2 SERPL-SCNC: 26 MMOL/L
CREAT SERPL-MCNC: 1 MG/DL
EST. GFR  (AFRICAN AMERICAN): >60 ML/MIN/1.73 M^2
EST. GFR  (NON AFRICAN AMERICAN): 56 ML/MIN/1.73 M^2
GLUCOSE SERPL-MCNC: 94 MG/DL
POTASSIUM SERPL-SCNC: 5 MMOL/L
PROT SERPL-MCNC: 7.9 G/DL
SODIUM SERPL-SCNC: 131 MMOL/L

## 2017-09-22 PROCEDURE — 3075F SYST BP GE 130 - 139MM HG: CPT | Mod: S$GLB,,, | Performed by: FAMILY MEDICINE

## 2017-09-22 PROCEDURE — 3008F BODY MASS INDEX DOCD: CPT | Mod: S$GLB,,, | Performed by: FAMILY MEDICINE

## 2017-09-22 PROCEDURE — 99214 OFFICE O/P EST MOD 30 MIN: CPT | Mod: S$GLB,,, | Performed by: FAMILY MEDICINE

## 2017-09-22 PROCEDURE — 36415 COLL VENOUS BLD VENIPUNCTURE: CPT

## 2017-09-22 PROCEDURE — 3078F DIAST BP <80 MM HG: CPT | Mod: S$GLB,,, | Performed by: FAMILY MEDICINE

## 2017-09-22 PROCEDURE — 80053 COMPREHEN METABOLIC PANEL: CPT

## 2017-09-22 PROCEDURE — 99999 PR PBB SHADOW E&M-EST. PATIENT-LVL III: CPT | Mod: PBBFAC,,, | Performed by: FAMILY MEDICINE

## 2017-09-22 PROCEDURE — 1159F MED LIST DOCD IN RCRD: CPT | Mod: S$GLB,,, | Performed by: FAMILY MEDICINE

## 2017-09-22 PROCEDURE — 1126F AMNT PAIN NOTED NONE PRSNT: CPT | Mod: S$GLB,,, | Performed by: FAMILY MEDICINE

## 2017-09-22 RX ORDER — CIPROFLOXACIN 500 MG/1
500 TABLET ORAL 2 TIMES DAILY
Qty: 14 TABLET | Refills: 0 | Status: SHIPPED | OUTPATIENT
Start: 2017-09-22 | End: 2018-04-12

## 2017-09-22 NOTE — PROGRESS NOTES
Subjective:       Patient ID: Tara Mondragon is a 71 y.o. female.    Chief Complaint: Urinary Tract Infection    71 yr old pleasant white female with CAD, HTN, HLD, PAD, MVR s/p porcine valve replacement, Hyperthyroidism presents today for her routine follow up and concerns regarding urinary frequency and burning.    Urinary frequency/burning - onset 5 days ago and progressively worsening - denies any hematuria or suprapubic pain or flank pain - she also has strong smell in her urine    HTN - controlled however sodium is consistently below normal - lab repeat needed      History as below - reviewed         Urinary Tract Infection    This is a new problem. The current episode started in the past 7 days. The problem occurs every urination. The problem has been unchanged. The quality of the pain is described as burning. The pain is at a severity of 3/10. The pain is mild. She is not sexually active. There is no history of pyelonephritis. Associated symptoms include frequency. Pertinent negatives include no behavior changes, flank pain, hematuria, nausea, urgency, vomiting, bubble bath use, constipation or withholding. She has tried increased fluids for the symptoms. The treatment provided mild relief. Her past medical history is significant for hypertension. There is no history of catheterization, diabetes insipidus, genitourinary reflux, kidney stones, recurrent UTIs, a single kidney, STD or a urological procedure.     Review of Systems   Constitutional: Positive for unexpected weight change. Negative for activity change and diaphoresis.   HENT: Negative.  Negative for congestion, ear discharge, hearing loss, rhinorrhea, sore throat, trouble swallowing and voice change.    Eyes: Negative.  Negative for pain, discharge and visual disturbance.   Respiratory: Negative.  Negative for chest tightness, shortness of breath and wheezing.    Cardiovascular: Negative.  Negative for chest pain and palpitations.    Gastrointestinal: Negative.  Negative for abdominal distention, anal bleeding, blood in stool, constipation, diarrhea, nausea and vomiting.   Endocrine: Positive for polyuria. Negative for cold intolerance and polydipsia.   Genitourinary: Positive for dysuria and frequency. Negative for decreased urine volume, difficulty urinating, flank pain, hematuria, menstrual problem, urgency and vaginal pain.   Musculoskeletal: Negative.  Negative for arthralgias, gait problem, joint swelling, myalgias and neck pain.   Skin: Negative.  Negative for color change, pallor and wound.   Allergic/Immunologic: Negative.  Negative for environmental allergies and immunocompromised state.   Neurological: Negative.  Negative for dizziness, tremors, seizures, speech difficulty, weakness and headaches.   Hematological: Negative.  Negative for adenopathy. Does not bruise/bleed easily.   Psychiatric/Behavioral: Negative.  Negative for agitation, confusion, decreased concentration, dysphoric mood, hallucinations, self-injury and suicidal ideas. The patient is not nervous/anxious.        PMH/PSH/FH/SH/MED/ALLERGY reviewed    Objective:       Vitals:    09/22/17 1227   BP: 134/65   Pulse: (!) 59       Physical Exam   Constitutional: She is oriented to person, place, and time. She appears well-developed and well-nourished. No distress.   HENT:   Head: Normocephalic and atraumatic.   Right Ear: External ear normal.   Left Ear: External ear normal.   Nose: Nose normal.   Mouth/Throat: Oropharynx is clear and moist. No oropharyngeal exudate.   Eyes: Conjunctivae and EOM are normal. Pupils are equal, round, and reactive to light. Right eye exhibits no discharge. Left eye exhibits no discharge. No scleral icterus.   Neck: Normal range of motion. Neck supple. No JVD present. No tracheal deviation present. No thyromegaly present.   Cardiovascular: Normal rate, regular rhythm and intact distal pulses.  Exam reveals no gallop and no friction rub.     Murmur (2/6 systolic murmer best heard in aortic area) heard.  Pulmonary/Chest: Effort normal and breath sounds normal. No stridor. No respiratory distress. She has no wheezes. She has no rales. She exhibits no tenderness.   Abdominal: Soft. Bowel sounds are normal. She exhibits no distension and no mass. There is no tenderness. There is no rebound and no guarding. No hernia.   Musculoskeletal: Normal range of motion. She exhibits no edema or tenderness.   Lymphadenopathy:     She has no cervical adenopathy.   Neurological: She is alert and oriented to person, place, and time. She has normal reflexes. She displays normal reflexes. No cranial nerve deficit. She exhibits normal muscle tone. Coordination normal.   Skin: Skin is warm and dry. No rash noted. She is not diaphoretic. No erythema. No pallor.   Psychiatric: She has a normal mood and affect. Her behavior is normal. Judgment and thought content normal.       Assessment:       1. Acute cystitis without hematuria    2. Essential hypertension    3. Hyponatremia        Plan:       Tara was seen today for urinary tract infection.    Diagnoses and all orders for this visit:    Acute cystitis without hematuria  -     Urinalysis; Future  -     Urine culture; Future  -     ciprofloxacin HCl (CIPRO) 500 MG tablet; Take 1 tablet (500 mg total) by mouth 2 (two) times daily.    Essential hypertension  -     Comprehensive metabolic panel; Future    Hyponatremia  -     Comprehensive metabolic panel; Future        UTI  -fluids and cranberry juice  -cipro x 7 days  -UA and culture    Hyponatremia  -lab repeat  -gatorade and byron esteban    HTN  -controlled    Spent adequate time in obtaining history and explaining differentials    40 minutes spent during this visit of which greater than 50% devoted to face-face counseling and coordination of care regarding diagnosis and management plan    Return in about 6 months (around 3/22/2018), or if symptoms worsen or fail to  improve.

## 2017-10-01 RX ORDER — NITROGLYCERIN 0.4 MG/1
TABLET SUBLINGUAL
Qty: 100 TABLET | Refills: 0 | Status: SHIPPED | OUTPATIENT
Start: 2017-10-01 | End: 2017-10-11 | Stop reason: SDUPTHER

## 2017-10-11 RX ORDER — NITROGLYCERIN 0.4 MG/1
0.4 TABLET SUBLINGUAL EVERY 5 MIN PRN
Qty: 100 TABLET | Refills: 0 | Status: SHIPPED | OUTPATIENT
Start: 2017-10-11

## 2017-10-14 DIAGNOSIS — I10 ESSENTIAL HYPERTENSION: ICD-10-CM

## 2017-10-14 RX ORDER — AMLODIPINE BESYLATE 5 MG/1
TABLET ORAL
Qty: 90 TABLET | Refills: 0 | Status: SHIPPED | OUTPATIENT
Start: 2017-10-14 | End: 2018-01-12 | Stop reason: SDUPTHER

## 2017-10-26 RX ORDER — NITROGLYCERIN 0.4 MG/1
TABLET SUBLINGUAL
Qty: 100 TABLET | Refills: 0 | Status: SHIPPED | OUTPATIENT
Start: 2017-10-26 | End: 2018-07-19 | Stop reason: SDUPTHER

## 2017-11-14 RX ORDER — POTASSIUM CHLORIDE 600 MG/1
TABLET, FILM COATED, EXTENDED RELEASE ORAL
Qty: 90 TABLET | Refills: 0 | Status: SHIPPED | OUTPATIENT
Start: 2017-11-14 | End: 2018-07-19 | Stop reason: SDUPTHER

## 2017-12-13 ENCOUNTER — PATIENT MESSAGE (OUTPATIENT)
Dept: RESEARCH | Facility: HOSPITAL | Age: 72
End: 2017-12-13

## 2017-12-13 ENCOUNTER — TELEPHONE (OUTPATIENT)
Dept: RESEARCH | Facility: OTHER | Age: 72
End: 2017-12-13

## 2017-12-13 NOTE — TELEPHONE ENCOUNTER
I contacted MsYonatan Tara to let her know that the [facility] clinic will be closed for the holidays from December 21-January 8 and she will need to reschedule her appointment for the TELEX study screening.

## 2018-01-01 RX ORDER — METOPROLOL TARTRATE 50 MG/1
TABLET ORAL
Qty: 60 TABLET | Refills: 3 | Status: SHIPPED | OUTPATIENT
Start: 2018-01-01 | End: 2018-04-27 | Stop reason: SDUPTHER

## 2018-01-12 DIAGNOSIS — I10 ESSENTIAL HYPERTENSION: ICD-10-CM

## 2018-01-12 RX ORDER — AMLODIPINE BESYLATE 5 MG/1
TABLET ORAL
Qty: 90 TABLET | Refills: 0 | Status: SHIPPED | OUTPATIENT
Start: 2018-01-12 | End: 2018-04-07 | Stop reason: SDUPTHER

## 2018-02-11 RX ORDER — POTASSIUM CHLORIDE 600 MG/1
TABLET, FILM COATED, EXTENDED RELEASE ORAL
Qty: 90 TABLET | Refills: 0 | Status: SHIPPED | OUTPATIENT
Start: 2018-02-11 | End: 2018-07-19 | Stop reason: SDUPTHER

## 2018-04-02 DIAGNOSIS — I10 ESSENTIAL HYPERTENSION: ICD-10-CM

## 2018-04-02 DIAGNOSIS — I51.89 DIASTOLIC DYSFUNCTION: ICD-10-CM

## 2018-04-02 RX ORDER — SPIRONOLACTONE 25 MG/1
25 TABLET ORAL DAILY
Qty: 30 TABLET | Refills: 3 | Status: SHIPPED | OUTPATIENT
Start: 2018-04-02 | End: 2018-11-16 | Stop reason: SDUPTHER

## 2018-04-07 DIAGNOSIS — I10 ESSENTIAL HYPERTENSION: ICD-10-CM

## 2018-04-07 RX ORDER — AMLODIPINE BESYLATE 5 MG/1
TABLET ORAL
Qty: 90 TABLET | Refills: 0 | Status: SHIPPED | OUTPATIENT
Start: 2018-04-07 | End: 2018-06-30 | Stop reason: SDUPTHER

## 2018-04-12 ENCOUNTER — OFFICE VISIT (OUTPATIENT)
Dept: CARDIOLOGY | Facility: CLINIC | Age: 73
End: 2018-04-12
Payer: COMMERCIAL

## 2018-04-12 ENCOUNTER — LAB VISIT (OUTPATIENT)
Dept: LAB | Facility: HOSPITAL | Age: 73
End: 2018-04-12
Attending: INTERNAL MEDICINE
Payer: COMMERCIAL

## 2018-04-12 VITALS
WEIGHT: 199.5 LBS | HEART RATE: 71 BPM | HEIGHT: 65 IN | DIASTOLIC BLOOD PRESSURE: 74 MMHG | SYSTOLIC BLOOD PRESSURE: 124 MMHG | OXYGEN SATURATION: 95 % | BODY MASS INDEX: 33.24 KG/M2

## 2018-04-12 DIAGNOSIS — E66.9 OBESITY (BMI 30.0-34.9): ICD-10-CM

## 2018-04-12 DIAGNOSIS — I73.9 PAD (PERIPHERAL ARTERY DISEASE): ICD-10-CM

## 2018-04-12 DIAGNOSIS — I10 ESSENTIAL HYPERTENSION: ICD-10-CM

## 2018-04-12 DIAGNOSIS — I73.9 LEFT LEG CLAUDICATION: Primary | ICD-10-CM

## 2018-04-12 DIAGNOSIS — Z95.2 S/P MVR (MITRAL VALVE REPLACEMENT): ICD-10-CM

## 2018-04-12 DIAGNOSIS — E78.2 MIXED HYPERLIPIDEMIA: ICD-10-CM

## 2018-04-12 DIAGNOSIS — I25.10 CORONARY ARTERY DISEASE INVOLVING NATIVE CORONARY ARTERY OF NATIVE HEART WITHOUT ANGINA PECTORIS: ICD-10-CM

## 2018-04-12 DIAGNOSIS — Z95.1 S/P CABG X 2: ICD-10-CM

## 2018-04-12 DIAGNOSIS — I51.89 LEFT VENTRICULAR DIASTOLIC DYSFUNCTION, NYHA CLASS 3: ICD-10-CM

## 2018-04-12 LAB
ALBUMIN SERPL BCP-MCNC: 3.8 G/DL
ALP SERPL-CCNC: 108 U/L
ALT SERPL W/O P-5'-P-CCNC: 24 U/L
ANION GAP SERPL CALC-SCNC: 8 MMOL/L
AST SERPL-CCNC: 20 U/L
BASOPHILS # BLD AUTO: 0.02 K/UL
BASOPHILS NFR BLD: 0.3 %
BILIRUB SERPL-MCNC: 0.3 MG/DL
BUN SERPL-MCNC: 19 MG/DL
CALCIUM SERPL-MCNC: 10 MG/DL
CHLORIDE SERPL-SCNC: 99 MMOL/L
CO2 SERPL-SCNC: 27 MMOL/L
CREAT SERPL-MCNC: 1.1 MG/DL
DIFFERENTIAL METHOD: NORMAL
EOSINOPHIL # BLD AUTO: 0.2 K/UL
EOSINOPHIL NFR BLD: 1.9 %
ERYTHROCYTE [DISTWIDTH] IN BLOOD BY AUTOMATED COUNT: 12.3 %
EST. GFR  (AFRICAN AMERICAN): 57 ML/MIN/1.73 M^2
EST. GFR  (NON AFRICAN AMERICAN): 50 ML/MIN/1.73 M^2
GLUCOSE SERPL-MCNC: 93 MG/DL
HCT VFR BLD AUTO: 41.2 %
HGB BLD-MCNC: 13.6 G/DL
LYMPHOCYTES # BLD AUTO: 1.8 K/UL
LYMPHOCYTES NFR BLD: 23.2 %
MCH RBC QN AUTO: 29.4 PG
MCHC RBC AUTO-ENTMCNC: 33 G/DL
MCV RBC AUTO: 89 FL
MONOCYTES # BLD AUTO: 1 K/UL
MONOCYTES NFR BLD: 13.1 %
NEUTROPHILS # BLD AUTO: 4.9 K/UL
NEUTROPHILS NFR BLD: 61.2 %
PLATELET # BLD AUTO: 199 K/UL
PMV BLD AUTO: 10.3 FL
POTASSIUM SERPL-SCNC: 4.8 MMOL/L
PROT SERPL-MCNC: 8 G/DL
RBC # BLD AUTO: 4.62 M/UL
SODIUM SERPL-SCNC: 134 MMOL/L
TSH SERPL DL<=0.005 MIU/L-ACNC: 0.88 UIU/ML
WBC # BLD AUTO: 7.92 K/UL

## 2018-04-12 PROCEDURE — 36415 COLL VENOUS BLD VENIPUNCTURE: CPT

## 2018-04-12 PROCEDURE — 99999 PR PBB SHADOW E&M-EST. PATIENT-LVL III: CPT | Mod: PBBFAC,,, | Performed by: INTERNAL MEDICINE

## 2018-04-12 PROCEDURE — 84443 ASSAY THYROID STIM HORMONE: CPT

## 2018-04-12 PROCEDURE — 85025 COMPLETE CBC W/AUTO DIFF WBC: CPT

## 2018-04-12 PROCEDURE — 80053 COMPREHEN METABOLIC PANEL: CPT

## 2018-04-12 PROCEDURE — 99214 OFFICE O/P EST MOD 30 MIN: CPT | Mod: S$GLB,,, | Performed by: INTERNAL MEDICINE

## 2018-04-12 RX ORDER — CILOSTAZOL 50 MG/1
50 TABLET ORAL 2 TIMES DAILY
Qty: 60 TABLET | Refills: 11 | Status: ON HOLD | OUTPATIENT
Start: 2018-04-12 | End: 2019-01-26 | Stop reason: HOSPADM

## 2018-04-12 NOTE — PROGRESS NOTES
Subjective:   Patient ID:  Tara Mondragon is a 72 y.o. female who presents for follow-up of Follow-up      Problem List Items Addressed This Visit        Cardiac/Vascular    PAD (peripheral artery disease)    CAD (coronary artery disease)    Left ventricular diastolic dysfunction, NYHA class 3    S/P MVR (mitral valve replacement)    S/P CABG x 2    Hypertension    Hyperlipidemia       Endocrine    Obesity (BMI 30.0-34.9)      Other Visit Diagnoses     Left leg claudication    -  Primary          HPI: Patient with the above medically present for f/u. She has complaints of leg claudication with exertion that usually improves the more she works out. She goes to [employer]. No rest pain or ulcers. She is known to have PAD. Last Octavia showed left leg PAD. She is however having pain in both legs. No chest pain or dyspnea.   Patient is also complaining of weight gain. 10 lbs since previous visit. She is going to the gym and using weight watchers but continues to gain weight.    BP is controlled. Tolerating medications.     She  Is s/p CABG and MVR in 2013 after presentation with CHF and NSTEMI. She is still going to the gym 6 days weekly and quit smoking in 2013.     No orthopnea or PND      Review of Systems   Constitution: Negative.   HENT: Negative.    Eyes: Negative.    Cardiovascular: Negative.    Respiratory: Negative.    Endocrine: Negative.    Hematologic/Lymphatic: Negative.    Skin: Negative.    Musculoskeletal: Negative.    Gastrointestinal: Negative.    Neurological: Negative.        Patient's Medications   New Prescriptions    No medications on file   Previous Medications    ACETAMINOPHEN (TYLENOL) 325 MG TABLET    Take 325 mg by mouth once as needed for Pain.    AMLODIPINE (NORVASC) 5 MG TABLET    TAKE 1 TABLET BY MOUTH EVERY DAY    AMMONIUM LACTATE 12 % CREA    Apply to feet twice daily. Avoid use between toes.    ASPIRIN (ECOTRIN) 81 MG EC TABLET    Take 1 tablet by mouth Daily.    ATORVASTATIN (LIPITOR) 40  MG TABLET    Take 1 tablet (40 mg total) by mouth once daily.    CIPROFLOXACIN HCL (CIPRO) 500 MG TABLET    Take 1 tablet (500 mg total) by mouth 2 (two) times daily.    ERGOCALCIFEROL (ERGOCALCIFEROL) 50,000 UNIT CAP    Take 1 capsule (50,000 Units total) by mouth every 7 days.    GUAIFENESIN (MUCINEX) 600 MG 12 HR TABLET    Take 1,200 mg by mouth once as needed for Congestion.    METOPROLOL TARTRATE (LOPRESSOR) 50 MG TABLET    TAKE 1 TABLET BY MOUTH TWICE DAILY    NITROGLYCERIN (NITROSTAT) 0.4 MG SL TABLET    Place 1 tablet (0.4 mg total) under the tongue every 5 (five) minutes as needed for Chest pain.    NITROSTAT 0.4 MG SL TABLET    PLACE 1 TABLET UNDER THE TONGUE EVERY 5 MINUTES AS NEEDED FOR CHEST PAIN    POTASSIUM CHLORIDE (KLOR-CON) 8 MEQ TBSR    TAKE 1 TABLET BY MOUTH DAILY    POTASSIUM CHLORIDE (KLOR-CON) 8 MEQ TBSR    TAKE 1 TABLET BY MOUTH EVERY DAY    POTASSIUM CHLORIDE (KLOR-CON) 8 MEQ TBSR    TAKE 1 TABLET BY MOUTH EVERY DAY    SPIRONOLACTONE (ALDACTONE) 25 MG TABLET    Take 1 tablet (25 mg total) by mouth once daily.   Modified Medications    No medications on file   Discontinued Medications    AMLODIPINE (NORVASC) 5 MG TABLET    TAKE 1 TABLET BY MOUTH EVERY DAY       Objective:   Physical Exam   Constitutional: She is oriented to person, place, and time. She appears well-developed and well-nourished. No distress.   Examination of the digits showed no clubbing or cyanosis   HENT:   Head: Normocephalic and atraumatic.   Eyes: Conjunctivae are normal. Pupils are equal, round, and reactive to light. Right eye exhibits no discharge.   Neck: Normal range of motion. Neck supple. No JVD present. No thyromegaly present.   No carotid bruits   Cardiovascular: Normal rate, regular rhythm, S1 normal, S2 normal and intact distal pulses.  PMI is not displaced.  Exam reveals no gallop, no friction rub and no opening snap.    Murmur heard.  Pulses:       Popliteal pulses are 2+ on the right side, and 1+ on the left  side.        Dorsalis pedis pulses are 2+ on the right side, and 0 on the left side.        Posterior tibial pulses are 2+ on the right side.   Pulmonary/Chest: Effort normal and breath sounds normal. No respiratory distress. She has no wheezes. She has no rales. She exhibits no tenderness.   Abdominal: Soft. Bowel sounds are normal. She exhibits no distension and no mass. There is no tenderness. There is no guarding.   No hepatosplenomegaly   Musculoskeletal: Normal range of motion. She exhibits no edema or tenderness.   Lymphadenopathy:     She has no cervical adenopathy.   Neurological: She is alert and oriented to person, place, and time.   Skin: Skin is warm. No rash noted. She is not diaphoretic. No erythema.   Psychiatric: She has a normal mood and affect.   Nursing note and vitals reviewed.      ECGs reviewed-NSR  LABS reviewed  Imaging including Echoes reviewed-normal ef with bioprosthetic mitral valve    Assessment:     1. Left leg claudication    2. PAD (peripheral artery disease)    3. Coronary artery disease involving native coronary artery of native heart without angina pectoris    4. Left ventricular diastolic dysfunction, NYHA class 3    5. S/P MVR (mitral valve replacement)    6. S/P CABG x 2    7. Essential hypertension    8. Mixed hyperlipidemia    9. Obesity (BMI 30.0-34.9)        Plan:     CMP, CBC, TSH  Start pletal 50 mg po bid to help with PAD  Continue current therapy  Continue exercise regimen  F/u in 3 months.

## 2018-04-27 RX ORDER — METOPROLOL TARTRATE 50 MG/1
TABLET ORAL
Qty: 60 TABLET | Refills: 6 | Status: SHIPPED | OUTPATIENT
Start: 2018-04-27 | End: 2018-11-16 | Stop reason: SDUPTHER

## 2018-05-07 RX ORDER — POTASSIUM CHLORIDE 600 MG/1
TABLET, FILM COATED, EXTENDED RELEASE ORAL
Qty: 90 TABLET | Refills: 0 | Status: SHIPPED | OUTPATIENT
Start: 2018-05-07 | End: 2018-07-19 | Stop reason: SDUPTHER

## 2018-05-17 ENCOUNTER — PATIENT MESSAGE (OUTPATIENT)
Dept: RESEARCH | Facility: HOSPITAL | Age: 73
End: 2018-05-17

## 2018-05-30 DIAGNOSIS — E55.9 VITAMIN D DEFICIENCY: ICD-10-CM

## 2018-05-30 RX ORDER — ERGOCALCIFEROL 1.25 MG/1
CAPSULE ORAL
Qty: 4 CAPSULE | Refills: 0 | Status: SHIPPED | OUTPATIENT
Start: 2018-05-30 | End: 2018-07-19

## 2018-06-30 DIAGNOSIS — I10 ESSENTIAL HYPERTENSION: ICD-10-CM

## 2018-06-30 RX ORDER — AMLODIPINE BESYLATE 5 MG/1
TABLET ORAL
Qty: 90 TABLET | Refills: 0 | Status: SHIPPED | OUTPATIENT
Start: 2018-06-30 | End: 2018-10-03 | Stop reason: SDUPTHER

## 2018-07-19 ENCOUNTER — OFFICE VISIT (OUTPATIENT)
Dept: CARDIOLOGY | Facility: CLINIC | Age: 73
End: 2018-07-19
Payer: COMMERCIAL

## 2018-07-19 VITALS
WEIGHT: 199 LBS | OXYGEN SATURATION: 98 % | HEIGHT: 65 IN | SYSTOLIC BLOOD PRESSURE: 136 MMHG | HEART RATE: 71 BPM | BODY MASS INDEX: 33.15 KG/M2 | DIASTOLIC BLOOD PRESSURE: 78 MMHG

## 2018-07-19 DIAGNOSIS — E78.2 MIXED HYPERLIPIDEMIA: ICD-10-CM

## 2018-07-19 DIAGNOSIS — I10 ESSENTIAL HYPERTENSION: ICD-10-CM

## 2018-07-19 DIAGNOSIS — Z95.1 S/P CABG X 2: ICD-10-CM

## 2018-07-19 DIAGNOSIS — I51.89 DIASTOLIC DYSFUNCTION: ICD-10-CM

## 2018-07-19 DIAGNOSIS — Z95.2 S/P MVR (MITRAL VALVE REPLACEMENT): ICD-10-CM

## 2018-07-19 DIAGNOSIS — I73.9 PAD (PERIPHERAL ARTERY DISEASE): ICD-10-CM

## 2018-07-19 DIAGNOSIS — I25.10 CORONARY ARTERY DISEASE INVOLVING NATIVE CORONARY ARTERY OF NATIVE HEART WITHOUT ANGINA PECTORIS: Primary | ICD-10-CM

## 2018-07-19 DIAGNOSIS — E66.9 OBESITY (BMI 30.0-34.9): ICD-10-CM

## 2018-07-19 PROCEDURE — 99999 PR PBB SHADOW E&M-EST. PATIENT-LVL III: CPT | Mod: PBBFAC,,, | Performed by: INTERNAL MEDICINE

## 2018-07-19 PROCEDURE — 99214 OFFICE O/P EST MOD 30 MIN: CPT | Mod: S$GLB,,, | Performed by: INTERNAL MEDICINE

## 2018-07-19 NOTE — PROGRESS NOTES
Subjective:   Patient ID:  Tara Mondragon is a 72 y.o. female who presents for follow-up of No chief complaint on file.      Problem List Items Addressed This Visit        Cardiac/Vascular    PAD (peripheral artery disease)    CAD (coronary artery disease) - Primary    S/P MVR (mitral valve replacement)    S/P CABG x 2    Hypertension    Hyperlipidemia    Diastolic dysfunction       Endocrine    Obesity (BMI 30.0-34.9)          HPI: Patient with the above medically present for f/u. She is doing well with no further pain in legs. No chest pain or dyspnea. She is still active and weight is stable.   BP is controlled. Tolerating medications and compliant  She is s/p CABG and MVR in 2013 after presentation with CHF and NSTEMI. She is still going to the gym 6 days weekly and quit smoking in 2013.   No orthopnea or PND      Review of Systems   Constitution: Negative.   HENT: Negative.    Eyes: Negative.    Cardiovascular: Negative.    Respiratory: Negative.    Endocrine: Negative.    Hematologic/Lymphatic: Negative.    Skin: Negative.    Musculoskeletal: Negative.    Gastrointestinal: Negative.    Neurological: Negative.        Patient's Medications   New Prescriptions    No medications on file   Previous Medications    ACETAMINOPHEN (TYLENOL) 325 MG TABLET    Take 325 mg by mouth once as needed for Pain.    AMLODIPINE (NORVASC) 5 MG TABLET    TAKE 1 TABLET BY MOUTH EVERY DAY    AMMONIUM LACTATE 12 % CREA    Apply to feet twice daily. Avoid use between toes.    ASPIRIN (ECOTRIN) 81 MG EC TABLET    Take 1 tablet by mouth Daily.    ATORVASTATIN (LIPITOR) 40 MG TABLET    Take 1 tablet (40 mg total) by mouth once daily.    CILOSTAZOL (PLETAL) 50 MG TAB    Take 1 tablet (50 mg total) by mouth 2 (two) times daily.    GUAIFENESIN (MUCINEX) 600 MG 12 HR TABLET    Take 1,200 mg by mouth once as needed for Congestion.    METOPROLOL TARTRATE (LOPRESSOR) 50 MG TABLET    TAKE 1 TABLET BY MOUTH TWICE DAILY    NITROGLYCERIN (NITROSTAT)  0.4 MG SL TABLET    Place 1 tablet (0.4 mg total) under the tongue every 5 (five) minutes as needed for Chest pain.    POTASSIUM CHLORIDE (KLOR-CON) 8 MEQ TBSR    TAKE 1 TABLET BY MOUTH DAILY    SPIRONOLACTONE (ALDACTONE) 25 MG TABLET    Take 1 tablet (25 mg total) by mouth once daily.   Modified Medications    No medications on file   Discontinued Medications    ERGOCALCIFEROL (ERGOCALCIFEROL) 50,000 UNIT CAP    TAKE 1 CAPSULE BY MOUTH EVERY 7 DAYS    NITROSTAT 0.4 MG SL TABLET    PLACE 1 TABLET UNDER THE TONGUE EVERY 5 MINUTES AS NEEDED FOR CHEST PAIN    POTASSIUM CHLORIDE (KLOR-CON) 8 MEQ TBSR    TAKE 1 TABLET BY MOUTH EVERY DAY    POTASSIUM CHLORIDE (KLOR-CON) 8 MEQ TBSR    TAKE 1 TABLET BY MOUTH EVERY DAY    POTASSIUM CHLORIDE (KLOR-CON) 8 MEQ TBSR    TAKE 1 TABLET BY MOUTH EVERY DAY       Objective:   Physical Exam   Constitutional: She is oriented to person, place, and time. She appears well-developed and well-nourished. No distress.   Examination of the digits showed no clubbing or cyanosis   HENT:   Head: Normocephalic and atraumatic.   Eyes: Conjunctivae are normal. Pupils are equal, round, and reactive to light. Right eye exhibits no discharge.   Neck: Normal range of motion. Neck supple. No JVD present. No thyromegaly present.   No carotid bruits   Cardiovascular: Normal rate, regular rhythm, S1 normal, S2 normal and intact distal pulses.  PMI is not displaced.  Exam reveals no gallop, no friction rub and no opening snap.    Murmur heard.  Pulses:       Popliteal pulses are 2+ on the right side, and 1+ on the left side.        Dorsalis pedis pulses are 2+ on the right side, and 0 on the left side.        Posterior tibial pulses are 2+ on the right side.   Pulmonary/Chest: Effort normal and breath sounds normal. No respiratory distress. She has no wheezes. She has no rales. She exhibits no tenderness.   Abdominal: Soft. Bowel sounds are normal. She exhibits no distension and no mass. There is no  tenderness. There is no guarding.   No hepatosplenomegaly   Musculoskeletal: Normal range of motion. She exhibits no edema or tenderness.   Lymphadenopathy:     She has no cervical adenopathy.   Neurological: She is alert and oriented to person, place, and time.   Skin: Skin is warm. No rash noted. She is not diaphoretic. No erythema.   Psychiatric: She has a normal mood and affect.   Nursing note and vitals reviewed.      ECGs reviewed-NSR  LABS reviewed  Imaging including Echoes reviewed-normal ef with bioprosthetic mitral valve    Assessment:     1. Coronary artery disease involving native coronary artery of native heart without angina pectoris    2. Mixed hyperlipidemia    3. Essential hypertension    4. Diastolic dysfunction    5. S/P MVR (mitral valve replacement)    6. S/P CABG x 2    7. PAD (peripheral artery disease)    8. Obesity (BMI 30.0-34.9)        Plan:     Continue current therapy  Continue exercise regimen  Weight loss  Low salt diet  F/u in 6 months.

## 2018-07-20 DIAGNOSIS — I25.10 CORONARY ARTERY DISEASE INVOLVING NATIVE CORONARY ARTERY OF NATIVE HEART WITHOUT ANGINA PECTORIS: ICD-10-CM

## 2018-07-20 DIAGNOSIS — I10 ESSENTIAL HYPERTENSION: ICD-10-CM

## 2018-07-20 RX ORDER — ATORVASTATIN CALCIUM 40 MG/1
TABLET, FILM COATED ORAL
Qty: 90 TABLET | Refills: 0 | Status: SHIPPED | OUTPATIENT
Start: 2018-07-20 | End: 2018-10-15 | Stop reason: SDUPTHER

## 2018-08-17 RX ORDER — POTASSIUM CHLORIDE 600 MG/1
TABLET, FILM COATED, EXTENDED RELEASE ORAL
Qty: 90 TABLET | Refills: 0 | Status: SHIPPED | OUTPATIENT
Start: 2018-08-17 | End: 2018-11-16

## 2018-10-03 DIAGNOSIS — I10 ESSENTIAL HYPERTENSION: ICD-10-CM

## 2018-10-03 RX ORDER — AMLODIPINE BESYLATE 5 MG/1
TABLET ORAL
Qty: 90 TABLET | Refills: 0 | Status: SHIPPED | OUTPATIENT
Start: 2018-10-03 | End: 2019-01-09 | Stop reason: ALTCHOICE

## 2018-10-15 DIAGNOSIS — I25.10 CORONARY ARTERY DISEASE INVOLVING NATIVE CORONARY ARTERY OF NATIVE HEART WITHOUT ANGINA PECTORIS: ICD-10-CM

## 2018-10-15 DIAGNOSIS — I10 ESSENTIAL HYPERTENSION: ICD-10-CM

## 2018-10-15 RX ORDER — ATORVASTATIN CALCIUM 40 MG/1
TABLET, FILM COATED ORAL
Qty: 90 TABLET | Refills: 4 | Status: SHIPPED | OUTPATIENT
Start: 2018-10-15 | End: 2019-11-05 | Stop reason: SDUPTHER

## 2018-11-12 ENCOUNTER — TELEPHONE (OUTPATIENT)
Dept: FAMILY MEDICINE | Facility: CLINIC | Age: 73
End: 2018-11-12

## 2018-11-12 NOTE — TELEPHONE ENCOUNTER
Spoke to pt and states that she does not have active SOB. Pt scheduled her physical with Dr. Mata and stated that she will call her Cardiologist to schedule an appt regarding SOB with Activity.

## 2018-11-15 ENCOUNTER — TELEPHONE (OUTPATIENT)
Dept: CARDIOLOGY | Facility: CLINIC | Age: 73
End: 2018-11-15

## 2018-11-15 NOTE — TELEPHONE ENCOUNTER
Patient called to say that she is having SOB on exertion and wants to see the Dr.  Appointment booked and patient aware.

## 2018-11-15 NOTE — TELEPHONE ENCOUNTER
----- Message from Vasu Rivero sent at 11/15/2018 10:51 AM CST -----  Contact: [phone]  Patient requested to speak with the nurse because she was trying to schedule an appt for shortness of breath. Advised patient to go to the ER. Please call.

## 2018-11-15 NOTE — TELEPHONE ENCOUNTER
----- Message from Vasu Rivero sent at 11/15/2018 10:51 AM CST -----  Contact: [phone]  Patient requested to speak with the nurse because she was trying to schedule an appt for shortness of breath. Advised patient to go to the ER. Please call.

## 2018-11-16 ENCOUNTER — OFFICE VISIT (OUTPATIENT)
Dept: CARDIOLOGY | Facility: CLINIC | Age: 73
End: 2018-11-16
Payer: COMMERCIAL

## 2018-11-16 VITALS
HEIGHT: 65 IN | BODY MASS INDEX: 32.82 KG/M2 | OXYGEN SATURATION: 96 % | WEIGHT: 197 LBS | SYSTOLIC BLOOD PRESSURE: 143 MMHG | DIASTOLIC BLOOD PRESSURE: 79 MMHG | HEART RATE: 82 BPM

## 2018-11-16 DIAGNOSIS — Z95.1 S/P CABG X 2: ICD-10-CM

## 2018-11-16 DIAGNOSIS — R06.09 DOE (DYSPNEA ON EXERTION): ICD-10-CM

## 2018-11-16 DIAGNOSIS — I25.10 CORONARY ARTERY DISEASE WITHOUT ANGINA PECTORIS, UNSPECIFIED VESSEL OR LESION TYPE, UNSPECIFIED WHETHER NATIVE OR TRANSPLANTED HEART: ICD-10-CM

## 2018-11-16 DIAGNOSIS — I51.89 DIASTOLIC DYSFUNCTION: ICD-10-CM

## 2018-11-16 DIAGNOSIS — I25.10 CORONARY ARTERY DISEASE INVOLVING NATIVE CORONARY ARTERY OF NATIVE HEART WITHOUT ANGINA PECTORIS: ICD-10-CM

## 2018-11-16 DIAGNOSIS — I10 ESSENTIAL HYPERTENSION: ICD-10-CM

## 2018-11-16 DIAGNOSIS — I20.89 ANGINAL EQUIVALENT: Primary | ICD-10-CM

## 2018-11-16 DIAGNOSIS — Z95.2 S/P MVR (MITRAL VALVE REPLACEMENT): ICD-10-CM

## 2018-11-16 DIAGNOSIS — I73.9 PAD (PERIPHERAL ARTERY DISEASE): ICD-10-CM

## 2018-11-16 DIAGNOSIS — E78.2 MIXED HYPERLIPIDEMIA: ICD-10-CM

## 2018-11-16 DIAGNOSIS — Z91.89 AT RISK FOR CORONARY ARTERY DISEASE: ICD-10-CM

## 2018-11-16 PROCEDURE — 99214 OFFICE O/P EST MOD 30 MIN: CPT | Mod: 25,S$GLB,, | Performed by: INTERNAL MEDICINE

## 2018-11-16 PROCEDURE — 93000 ELECTROCARDIOGRAM COMPLETE: CPT | Mod: S$GLB,,, | Performed by: INTERNAL MEDICINE

## 2018-11-16 PROCEDURE — 99999 PR PBB SHADOW E&M-EST. PATIENT-LVL III: CPT | Mod: PBBFAC,,, | Performed by: INTERNAL MEDICINE

## 2018-11-16 RX ORDER — SPIRONOLACTONE 25 MG/1
25 TABLET ORAL DAILY
Qty: 30 TABLET | Refills: 3 | Status: SHIPPED | OUTPATIENT
Start: 2018-11-16 | End: 2019-01-04 | Stop reason: SDUPTHER

## 2018-11-16 RX ORDER — POTASSIUM CHLORIDE 600 MG/1
TABLET, FILM COATED, EXTENDED RELEASE ORAL
Qty: 90 TABLET | Refills: 0 | Status: ON HOLD | OUTPATIENT
Start: 2018-11-16 | End: 2018-11-29 | Stop reason: HOSPADM

## 2018-11-16 RX ORDER — METOPROLOL TARTRATE 50 MG/1
TABLET ORAL
Qty: 60 TABLET | Refills: 0 | Status: ON HOLD | OUTPATIENT
Start: 2018-11-16 | End: 2018-11-29 | Stop reason: HOSPADM

## 2018-11-16 NOTE — PROGRESS NOTES
Subjective:   Patient ID:  Tara Mondragon is a 72 y.o. female who presents for follow-up of No chief complaint on file.      Problem List Items Addressed This Visit        Cardiac/Vascular    PAD (peripheral artery disease)    CAD (coronary artery disease)    Relevant Orders    NM Myocardial Perfusion Spect Multi Exer    Nuc Tread Stress test - Radiologist interpreted    S/P MVR (mitral valve replacement)    Relevant Orders    Transthoracic echo (TTE) complete (Cupid Only)    S/P CABG x 2    Relevant Orders    NM Myocardial Perfusion Spect Multi Exer    Nuc Tread Stress test - Radiologist interpreted    Transthoracic echo (TTE) complete (Cupid Only)    Hypertension    Hyperlipidemia    Diastolic dysfunction       Other    HERNANDES (dyspnea on exertion)    Relevant Orders    Transthoracic echo (TTE) complete (Cupid Only)      Other Visit Diagnoses     Anginal equivalent    -  Primary    Relevant Orders    NM Myocardial Perfusion Spect Multi Exer    Nuc Tread Stress test - Radiologist interpreted    At risk for coronary artery disease        Relevant Orders    NM Myocardial Perfusion Spect Multi Exer    Nuc Tread Stress test - Radiologist interpreted          HPI: Patient with the above medically present for f/u. She started having dyspnea with minimal exertion that started 2 weeks ago and have been progressing. She is use to be active and exercising so this have been a big change for her. No chest pain. She does have history of CABG/CAD. No dizziness or syncope, no fever/chills or coughing.   She is using 1 pillow to sleep at night or PND. She is only able to walk 50 fts. No leg edema.   She is s/p CABG and MVR in 2013 after presentation with CHF and NSTEMI.   Weight is down 2 lbs since previous visit.     Review of Systems   Constitution: Negative.   HENT: Negative.    Eyes: Negative.    Cardiovascular: Negative.    Respiratory: Negative.    Endocrine: Negative.    Hematologic/Lymphatic: Negative.    Skin: Negative.     Musculoskeletal: Negative.    Gastrointestinal: Negative.    Neurological: Negative.           Medication List           Accurate as of 11/16/18  9:21 AM. If you have any questions, ask your nurse or doctor.               CHANGE how you take these medications    potassium chloride 8 MEQ Tbsr  Commonly known as:  KLOR-CON  TAKE 1 TABLET BY MOUTH DAILY  What changed:  Another medication with the same name was removed. Continue taking this medication, and follow the directions you see here.  Changed by:  Qing Duffy MD        CONTINUE taking these medications    acetaminophen 325 MG tablet  Commonly known as:  TYLENOL     amLODIPine 5 MG tablet  Commonly known as:  NORVASC  TAKE 1 TABLET BY MOUTH EVERY DAY     ammonium lactate 12 % Crea  Apply to feet twice daily. Avoid use between toes.     aspirin 81 MG EC tablet  Commonly known as:  ECOTRIN     atorvastatin 40 MG tablet  Commonly known as:  LIPITOR  TAKE 1 TABLET(40 MG) BY MOUTH EVERY DAY     cilostazol 50 MG Tab  Commonly known as:  PLETAL  Take 1 tablet (50 mg total) by mouth 2 (two) times daily.     guaiFENesin 600 mg 12 hr tablet  Commonly known as:  MUCINEX     metoprolol tartrate 50 MG tablet  Commonly known as:  LOPRESSOR  TAKE 1 TABLET BY MOUTH TWICE DAILY     nitroGLYCERIN 0.4 MG SL tablet  Commonly known as:  NITROSTAT  Place 1 tablet (0.4 mg total) under the tongue every 5 (five) minutes as needed for Chest pain.     spironolactone 25 MG tablet  Commonly known as:  ALDACTONE  Take 1 tablet (25 mg total) by mouth once daily.            Objective:   Physical Exam   Constitutional: She is oriented to person, place, and time. She appears well-developed and well-nourished. No distress.   Examination of the digits showed no clubbing or cyanosis   HENT:   Head: Normocephalic and atraumatic.   Eyes: Conjunctivae are normal. Pupils are equal, round, and reactive to light. Right eye exhibits no discharge.   Neck: Normal range of motion. Neck supple. No JVD  present. No thyromegaly present.   No carotid bruits   Cardiovascular: Normal rate, regular rhythm, S1 normal, S2 normal and intact distal pulses. PMI is not displaced. Exam reveals no gallop, no friction rub and no opening snap.   Murmur heard.  Pulses:       Popliteal pulses are 2+ on the right side, and 1+ on the left side.        Dorsalis pedis pulses are 2+ on the right side, and 0 on the left side.        Posterior tibial pulses are 2+ on the right side.   Pulmonary/Chest: Effort normal and breath sounds normal. No respiratory distress. She has no wheezes. She has no rales. She exhibits no tenderness.   Abdominal: Soft. Bowel sounds are normal. She exhibits no distension and no mass. There is no tenderness. There is no guarding.   No hepatosplenomegaly   Musculoskeletal: Normal range of motion. She exhibits no edema or tenderness.   Lymphadenopathy:     She has no cervical adenopathy.   Neurological: She is alert and oriented to person, place, and time.   Skin: Skin is warm. No rash noted. She is not diaphoretic. No erythema.   Psychiatric: She has a normal mood and affect.   Nursing note and vitals reviewed.      ECGs reviewed-NSR  LABS reviewed  Imaging including Echoes reviewed-normal ef with bioprosthetic mitral valve    Assessment:     1. Anginal equivalent    2. HERNANDES (dyspnea on exertion)    3. Diastolic dysfunction    4. Essential hypertension    5. Mixed hyperlipidemia    6. S/P CABG x 2    7. S/P MVR (mitral valve replacement)    8. PAD (peripheral artery disease)    9. Coronary artery disease involving native coronary artery of native heart without angina pectoris    10. At risk for coronary artery disease        Plan:   Patient's symptoms are concerning for Angina equivalent with PMH of CABG and sudden change in functional capacity.   Will get treadmill Nuclear stress  2 D echo complete to evaluate MVR  Continue current therapy  Continue exercise regimen  Weight loss  Low salt diet  F/u in 6 months.  Call with results. Will see sooner if tests abnormal.

## 2018-11-17 ENCOUNTER — HOSPITAL ENCOUNTER (OUTPATIENT)
Facility: HOSPITAL | Age: 73
Discharge: HOME | End: 2018-11-19
Attending: EMERGENCY MEDICINE | Admitting: INTERNAL MEDICINE
Payer: COMMERCIAL

## 2018-11-17 DIAGNOSIS — I50.31 ACUTE DIASTOLIC CONGESTIVE HEART FAILURE: ICD-10-CM

## 2018-11-17 DIAGNOSIS — R06.02 SHORTNESS OF BREATH: ICD-10-CM

## 2018-11-17 DIAGNOSIS — I21.4 NSTEMI (NON-ST ELEVATED MYOCARDIAL INFARCTION): ICD-10-CM

## 2018-11-17 DIAGNOSIS — I50.9 ACUTE CONGESTIVE HEART FAILURE, UNSPECIFIED HEART FAILURE TYPE: Primary | ICD-10-CM

## 2018-11-17 DIAGNOSIS — Z95.2 S/P MVR (MITRAL VALVE REPLACEMENT): ICD-10-CM

## 2018-11-17 DIAGNOSIS — Z95.2 H/O MITRAL VALVE REPLACEMENT: ICD-10-CM

## 2018-11-17 LAB
ALBUMIN SERPL BCP-MCNC: 4.1 G/DL
ALP SERPL-CCNC: 124 U/L
ALT SERPL W/O P-5'-P-CCNC: 30 U/L
ANION GAP SERPL CALC-SCNC: 11 MMOL/L
AST SERPL-CCNC: 32 U/L
BASOPHILS # BLD AUTO: 0.02 K/UL
BASOPHILS NFR BLD: 0.2 %
BILIRUB SERPL-MCNC: 1.1 MG/DL
BNP SERPL-MCNC: 141 PG/ML
BUN SERPL-MCNC: 16 MG/DL
CALCIUM SERPL-MCNC: 10.1 MG/DL
CHLORIDE SERPL-SCNC: 101 MMOL/L
CO2 SERPL-SCNC: 20 MMOL/L
CREAT SERPL-MCNC: 1 MG/DL
DIFFERENTIAL METHOD: ABNORMAL
EOSINOPHIL # BLD AUTO: 0.1 K/UL
EOSINOPHIL NFR BLD: 0.9 %
ERYTHROCYTE [DISTWIDTH] IN BLOOD BY AUTOMATED COUNT: 12.7 %
EST. GFR  (AFRICAN AMERICAN): >60 ML/MIN/1.73 M^2
EST. GFR  (NON AFRICAN AMERICAN): 56 ML/MIN/1.73 M^2
GLUCOSE SERPL-MCNC: 110 MG/DL
HCT VFR BLD AUTO: 45.4 %
HGB BLD-MCNC: 15.6 G/DL
INR PPP: 1
LYMPHOCYTES # BLD AUTO: 1.9 K/UL
LYMPHOCYTES NFR BLD: 16.7 %
MCH RBC QN AUTO: 30.4 PG
MCHC RBC AUTO-ENTMCNC: 34.4 G/DL
MCV RBC AUTO: 89 FL
MONOCYTES # BLD AUTO: 1.4 K/UL
MONOCYTES NFR BLD: 12.3 %
NEUTROPHILS # BLD AUTO: 7.8 K/UL
NEUTROPHILS NFR BLD: 69.5 %
PLATELET # BLD AUTO: 195 K/UL
PMV BLD AUTO: 10.7 FL
POTASSIUM SERPL-SCNC: 4.8 MMOL/L
PROT SERPL-MCNC: 8.7 G/DL
PROTHROMBIN TIME: 10.3 SEC
RBC # BLD AUTO: 5.13 M/UL
SODIUM SERPL-SCNC: 132 MMOL/L
TROPONIN I SERPL DL<=0.01 NG/ML-MCNC: 0.04 NG/ML
TROPONIN I SERPL DL<=0.01 NG/ML-MCNC: 0.04 NG/ML
TROPONIN I SERPL DL<=0.01 NG/ML-MCNC: 0.05 NG/ML
WBC # BLD AUTO: 11.26 K/UL

## 2018-11-17 PROCEDURE — G0378 HOSPITAL OBSERVATION PER HR: HCPCS

## 2018-11-17 PROCEDURE — 96374 THER/PROPH/DIAG INJ IV PUSH: CPT

## 2018-11-17 PROCEDURE — A4216 STERILE WATER/SALINE, 10 ML: HCPCS | Performed by: EMERGENCY MEDICINE

## 2018-11-17 PROCEDURE — 85025 COMPLETE CBC W/AUTO DIFF WBC: CPT

## 2018-11-17 PROCEDURE — 93005 ELECTROCARDIOGRAM TRACING: CPT

## 2018-11-17 PROCEDURE — 83880 ASSAY OF NATRIURETIC PEPTIDE: CPT

## 2018-11-17 PROCEDURE — 84484 ASSAY OF TROPONIN QUANT: CPT | Mod: 91

## 2018-11-17 PROCEDURE — 99285 EMERGENCY DEPT VISIT HI MDM: CPT | Mod: 25

## 2018-11-17 PROCEDURE — 84484 ASSAY OF TROPONIN QUANT: CPT

## 2018-11-17 PROCEDURE — 63600175 PHARM REV CODE 636 W HCPCS: Performed by: EMERGENCY MEDICINE

## 2018-11-17 PROCEDURE — 25000003 PHARM REV CODE 250: Performed by: EMERGENCY MEDICINE

## 2018-11-17 PROCEDURE — 85610 PROTHROMBIN TIME: CPT

## 2018-11-17 PROCEDURE — 80053 COMPREHEN METABOLIC PANEL: CPT

## 2018-11-17 RX ORDER — SODIUM CHLORIDE 0.9 % (FLUSH) 0.9 %
3 SYRINGE (ML) INJECTION EVERY 8 HOURS
Status: DISCONTINUED | OUTPATIENT
Start: 2018-11-17 | End: 2018-11-19 | Stop reason: HOSPADM

## 2018-11-17 RX ORDER — FUROSEMIDE 10 MG/ML
40 INJECTION INTRAMUSCULAR; INTRAVENOUS 2 TIMES DAILY
Status: DISCONTINUED | OUTPATIENT
Start: 2018-11-17 | End: 2018-11-19 | Stop reason: HOSPADM

## 2018-11-17 RX ORDER — FUROSEMIDE 10 MG/ML
40 INJECTION INTRAMUSCULAR; INTRAVENOUS
Status: COMPLETED | OUTPATIENT
Start: 2018-11-17 | End: 2018-11-17

## 2018-11-17 RX ADMIN — FUROSEMIDE 40 MG: 10 INJECTION, SOLUTION INTRAMUSCULAR; INTRAVENOUS at 08:11

## 2018-11-17 RX ADMIN — FUROSEMIDE 40 MG: 10 INJECTION, SOLUTION INTRAMUSCULAR; INTRAVENOUS at 01:11

## 2018-11-17 RX ADMIN — Medication 3 ML: at 08:11

## 2018-11-17 NOTE — ED NOTES
Patient arrived to ED with complaints of shortness of breath x2 days. States she was seen by her cardiologist yesterday for SOB and Echo was ordered for a later date. Patient states that her condition worsened since yesterday which is why she sought care today. Oxygen saturation is 93% on 2L of oxygen via nasal cannula. EKG done and MD notified, cardiac monitoring in progress.

## 2018-11-17 NOTE — ED PROVIDER NOTES
Encounter Date: 11/17/2018    SCRIBE #1 NOTE: I, Gerald Ramirez, am scribing for, and in the presence of,  Dr. Olsen. I have scribed the entire note.       History     Chief Complaint   Patient presents with    Shortness of Breath     x 2 days. seen Dr. Duffy yesterday but states it was not this bad. was supposed to follow up next week but unable to make it     Time seen by provider: 1:16 PM    This is a 72 y.o. female who presents with complaint of SOB x 2 days, worse this morning. She reports orthopnea, stating it is very uncomfortable while laying down. Patient was seen by Dr. Duffy yesterday for the same complaint, but she notes that her symptoms were not this severe at that time. Patient notes that she began to hear her cardiac valve last night. On arrival, patient has a respiratory rate of 24 with O2 sats of 93%. Patient denies any CP, nausea, diaphoresis, cough, fever, or any other concerning symptoms. She has a follow up appointment with Dr. Duffy on 11/27. Patient notes she had a mitral valve replacement and CABG 5 years ago.      The history is provided by the patient.     Review of patient's allergies indicates:   Allergen Reactions    Plavix [clopidogrel] Rash     Past Medical History:   Diagnosis Date    Acute coronary syndrome     Cardiomyopathy     Coronary artery disease     Hypertension     Hyperthyroidism     Osteoporosis     PAD (peripheral artery disease)     Valvular regurgitation     Mild/Mod MR post MI     Past Surgical History:   Procedure Laterality Date    CARDIAC VALVE SURGERY      porcine MVR    CHOLECYSTECTOMY      CORONARY ANGIOPLASTY      CORONARY ARTERY BYPASS GRAFT      CORONARY ARTERY BYPASS GRAFT (CABG) N/A 2/26/2013    [facility] OR 2ND FLR    REPAIR VALVE-MITRAL N/A 2/26/2013    [facility] OR 2ND FLR    TONSILLECTOMY       Family History   Problem Relation Age of Onset    Heart failure Mother      Hypothyroidism Mother     Liver disease Father     Hypertension Sister     Hypothyroidism Sister     Hypertension Brother     Asthma Daughter      Social History     Tobacco Use    Smoking status: Former Smoker     Packs/day: 1.00     Years: 50.00     Pack years: 50.00     Last attempt to quit: 2/3/2013     Years since quittin.7    Smokeless tobacco: Never Used   Substance Use Topics    Alcohol use: No    Drug use: No     Review of Systems   Constitutional: Negative for activity change, appetite change and fever.   HENT: Negative for congestion, ear pain and sore throat.    Eyes: Negative for discharge.   Respiratory: Positive for shortness of breath. Negative for cough and wheezing.    Cardiovascular: Negative for chest pain, palpitations and leg swelling.   Gastrointestinal: Negative for abdominal pain, diarrhea, nausea and vomiting.   Genitourinary: Negative for dysuria, frequency, urgency and vaginal discharge.   Musculoskeletal: Negative for back pain and neck pain.   Skin: Negative for rash.   Neurological: Negative for weakness, light-headedness and headaches.   Psychiatric/Behavioral: Negative for confusion and suicidal ideas.     Physical Exam     Initial Vitals [18 1252]   BP Pulse Resp Temp SpO2   (!) 164/82 90 (!) 24 -- (!) 93 %      MAP       --         Physical Exam    Nursing note and vitals reviewed.  Constitutional: She appears well-developed and well-nourished. She is diaphoretic.   Mildly diaphoretic   HENT:   Head: Normocephalic and atraumatic.   Mouth/Throat: Oropharynx is clear and moist.   Eyes: Conjunctivae and EOM are normal. Pupils are equal, round, and reactive to light.   Neck: Normal range of motion. Neck supple.   Cardiovascular: Normal rate, regular rhythm and intact distal pulses. Exam reveals no gallop and no friction rub.    Murmur heard.  Blowing systolic murmur   Pulmonary/Chest: Breath sounds normal. Tachypnea noted. She has no wheezes. She has no rhonchi. She  has no rales.   Lungs CTAB, although tachypnic   Abdominal: Soft. Bowel sounds are normal. She exhibits no distension. There is no tenderness. There is no rebound and no guarding.   Belly soft, nontender   Musculoskeletal: Normal range of motion. She exhibits no edema or tenderness.   No peripheral edema   Lymphadenopathy:     She has no cervical adenopathy.   Neurological: She is alert and oriented to person, place, and time. She has normal strength and normal reflexes.   Skin: Skin is warm.   Psychiatric: She has a normal mood and affect. Her behavior is normal. Judgment and thought content normal.       ED Course   Critical Care  Date/Time: 11/26/2018 10:23 AM  Performed by: Valerie Olsen MD  Authorized by: Qing Duffy MD   Total critical care time (exclusive of procedural time) : 40 minutes  Critical care was necessary to treat or prevent imminent or life-threatening deterioration of the following conditions: cardiac failure and respiratory failure.  Critical care was time spent personally by me on the following activities: development of treatment plan with patient or surrogate, interpretation of cardiac output measurements, examination of patient, ordering and performing treatments and interventions, ordering and review of radiographic studies, re-evaluation of patient's condition, evaluation of patient's response to treatment, obtaining history from patient or surrogate, ordering and review of laboratory studies, pulse oximetry and review of old charts.        Labs Reviewed   CBC W/ AUTO DIFFERENTIAL - Abnormal; Notable for the following components:       Result Value    Gran # (ANC) 7.8 (*)     Mono # 1.4 (*)     Lymph% 16.7 (*)     All other components within normal limits   COMPREHENSIVE METABOLIC PANEL - Abnormal; Notable for the following components:    Sodium 132 (*)     CO2 20 (*)     Total Protein 8.7 (*)     Total Bilirubin 1.1 (*)     eGFR if non  56 (*)     All other components  within normal limits   TROPONIN I - Abnormal; Notable for the following components:    Troponin I 0.046 (*)     All other components within normal limits   B-TYPE NATRIURETIC PEPTIDE - Abnormal; Notable for the following components:     (*)     All other components within normal limits   TROPONIN I - Abnormal; Notable for the following components:    Troponin I 0.045 (*)     All other components within normal limits   PROTIME-INR   TROPONIN I     EKG Readings: (Independently Interpreted)   Rhythm: Normal Sinus Rhythm. Heart Rate: 96. Ectopy: No Ectopy. Conduction: Normal. ST Segments: Normal ST Segments. T Waves: Normal. Clinical Impression: Normal Sinus Rhythm   Time: 1302     X-Rays:   Independently Interpreted Readings:   Other Readings:  Reviewed by myself, read by radiology.    Imaging Results          X-Ray Chest AP Portable (Final result)  Result time 11/17/18 13:34:52    Final result by Kayla Carrasco MD (11/17/18 13:34:52)                 Impression:      Cardiac decompensation.      Electronically signed by: Kayla Carrasco MD  Date:    11/17/2018  Time:    13:34             Narrative:    EXAMINATION:  XR CHEST AP PORTABLE    CLINICAL HISTORY:  sob;    TECHNIQUE:  Single frontal view of the chest was performed.    COMPARISON:  None    FINDINGS:  The heart is normal in size.  Median sternotomy wires are midline.  Calcified atheromatous disease affects the aorta.  There is pulmonary vascular congestion with pulmonary edema and small bilateral pleural effusions.                              Medical Decision Making:   Clinical Tests:   Lab Tests: Ordered and Reviewed  Radiological Study: Ordered and Reviewed  Medical Tests: Ordered and Reviewed  ED Management:  1520  Spoke with Dr. Duffy, who will admit the patient.    1630: The patient was given lasix and has begun to diurese. She is feeling much better. ECHO results discussed with Dr. Duffy. The patient's mitral valve is functioning and her EF is  55%. She is in CHF and will require diuresis.                   ED Course as of Nov 17 1924   Sat Nov 17, 2018   1435 Troponin I: (!) 0.046 [ST]      ED Course User Index  [ST] Valerie Olsen MD     Clinical Impression:     1. Acute congestive heart failure, unspecified heart failure type    2. Shortness of breath    3. H/O mitral valve replacement      Disposition:   Disposition: Admitted  Condition: Fair    I, Valerie Olsen, personally performed the services described in this documentation. All medical record entries made by the scribe were at my direction and in my presence.  I have reviewed the chart and agree that the record reflects my personal performance and is accurate and complete. Valerie Olsen M.D. 7:25 PM11/17/2018     Valerie Olsen MD  11/17/18 1926       Valerie Olsen MD  11/26/18 1024

## 2018-11-18 LAB
AORTIC ROOT ANNULUS: 2.67 CM
AV MEAN GRADIENT: 27 MMHG
AV PEAK GRADIENT: 32.04 MMHG
AV VALVE AREA: 1.13 CM2
BSA FOR ECHO PROCEDURE: 1.95 M2
CV ECHO LV RWT: 0.56 CM
DOP CALC AO PEAK VEL: 2.83 M/S
DOP CALC AO VTI: 75 CM
DOP CALC LVOT AREA: 2.83 CM2
DOP CALC LVOT DIAMETER: 1.9 CM
DOP CALC LVOT STROKE VOLUME: 84.53 CM3
DOP CALCLVOT PEAK VEL VTI: 29.83 CM
E WAVE DECELERATION TIME: 220.46 MSEC
E/A RATIO: 1.28
ECHO LV POSTERIOR WALL: 1.25 CM (ref 0.6–1.1)
FRACTIONAL SHORTENING: 47 % (ref 28–44)
INTERVENTRICULAR SEPTUM: 1.19 CM (ref 0.6–1.1)
LA MAJOR: 4.83 CM
LA MINOR: 4.83 CM
LA WIDTH: 3.8 CM
LEFT ATRIUM SIZE: 4.49 CM
LEFT ATRIUM VOLUME INDEX: 35.9 ML/M2
LEFT ATRIUM VOLUME: 70.05 CM3
LEFT INTERNAL DIMENSION IN SYSTOLE: 2.38 CM (ref 2.1–4)
LEFT VENTRICLE DIASTOLIC VOLUME INDEX: 46.22 ML/M2
LEFT VENTRICLE DIASTOLIC VOLUME: 90.13 ML
LEFT VENTRICLE MASS INDEX: 102.3 G/M2
LEFT VENTRICLE SYSTOLIC VOLUME INDEX: 10.1 ML/M2
LEFT VENTRICLE SYSTOLIC VOLUME: 19.69 ML
LEFT VENTRICULAR INTERNAL DIMENSION IN DIASTOLE: 4.45 CM (ref 3.5–6)
LEFT VENTRICULAR MASS: 199.42 G
MV MEAN GRADIENT: 8 MMHG
MV PEAK A VEL: 1.69 M/S
MV PEAK E VEL: 2.16 M/S
MV STENOSIS PRESSURE HALF TIME: 74 MS
MV VALVE AREA P 1/2 METHOD: 2.97 CM2
PISA TR MAX VEL: 3.72 M/S
RA MAJOR: 3.93 CM
RA PRESSURE: 3 MMHG
RIGHT VENTRICULAR END-DIASTOLIC DIMENSION: 2.59 CM
TR MAX PG: 55.35 MMHG
TV REST PULMONARY ARTERY PRESSURE: 58.35 MMHG

## 2018-11-18 PROCEDURE — A4216 STERILE WATER/SALINE, 10 ML: HCPCS | Performed by: EMERGENCY MEDICINE

## 2018-11-18 PROCEDURE — 25000003 PHARM REV CODE 250: Performed by: INTERNAL MEDICINE

## 2018-11-18 PROCEDURE — 25000003 PHARM REV CODE 250: Performed by: EMERGENCY MEDICINE

## 2018-11-18 PROCEDURE — 94761 N-INVAS EAR/PLS OXIMETRY MLT: CPT

## 2018-11-18 PROCEDURE — G0378 HOSPITAL OBSERVATION PER HR: HCPCS

## 2018-11-18 PROCEDURE — 99220 PR INITIAL OBSERVATION CARE,LEVL III: CPT | Mod: ,,, | Performed by: INTERNAL MEDICINE

## 2018-11-18 PROCEDURE — 27000221 HC OXYGEN, UP TO 24 HOURS

## 2018-11-18 PROCEDURE — 63600175 PHARM REV CODE 636 W HCPCS: Performed by: EMERGENCY MEDICINE

## 2018-11-18 RX ORDER — ACETAMINOPHEN 325 MG/1
650 TABLET ORAL EVERY 6 HOURS PRN
Status: DISCONTINUED | OUTPATIENT
Start: 2018-11-18 | End: 2018-11-19 | Stop reason: HOSPADM

## 2018-11-18 RX ADMIN — FUROSEMIDE 40 MG: 10 INJECTION, SOLUTION INTRAMUSCULAR; INTRAVENOUS at 10:11

## 2018-11-18 RX ADMIN — Medication 3 ML: at 02:11

## 2018-11-18 RX ADMIN — FUROSEMIDE 40 MG: 10 INJECTION, SOLUTION INTRAMUSCULAR; INTRAVENOUS at 06:11

## 2018-11-18 RX ADMIN — ACETAMINOPHEN 650 MG: 325 TABLET ORAL at 12:11

## 2018-11-18 RX ADMIN — Medication 3 ML: at 09:11

## 2018-11-18 RX ADMIN — Medication 3 ML: at 06:11

## 2018-11-18 RX ADMIN — ACETAMINOPHEN 650 MG: 325 TABLET ORAL at 03:11

## 2018-11-18 NOTE — H&P
[facility]               H&P      Reason for Admission:        Assessment:    1. Acute decompensated diastolic CHF with Pulmonary HTN  2. NSTEMI  3. CAD s/p CABG  4. HTN- controlled.   5. Moderate Aortic Stenosis        Plan:    Continue lasix 40 mg IV bid with strict I/Os  Nuclear stress in am  LINDA in am to better evaluate MVR and Aortic valve that appear to have moderate AS which could be worse,   Daily weights  Low salt diet discussed with patient again        Qing Duffy MD  Cardiology Service      HPI: 73 y/o female known to me with PMH of HTN, MVR with bioprosthesis, CAd s/p CABG and PAD present with worsening dyspnea. She was seen on Friday on the clinic and nuclear stress with echo ordered but patient began feeling worse. Dyspnea was associated with orthopnea and PND. CXR showed pulmonary edema. Functional capacity is greatly reduce. Patient BP is controlled. Echo showed normal ef. Unable to evaluate Diastology with MVR but PH is present with abnormal CXR. She is very concerned for bioprosthetic valve. Troponin is elevated.          Review of Systems -Except for what was mentioned above in HPI  Constitution: Negative.   HENT: Negative.   Eyes: Negative.   Cardiovascular: Negative.   Respiratory: Negative.   Endocrine: Negative.   Hematologic/Lymphatic: Negative.   Skin: Negative.   Musculoskeletal: Negative.   Gastrointestinal: Negative.           Past Surgical History:   Procedure Laterality Date    CARDIAC VALVE SURGERY      porcine MVR    CHOLECYSTECTOMY      CORONARY ANGIOPLASTY      CORONARY ARTERY BYPASS GRAFT      CORONARY ARTERY BYPASS GRAFT (CABG) N/A 2/26/2013    [facility] 2ND FLR    REPAIR VALVE-MITRAL N/A 2/26/2013    [facility] 2ND FLR    TONSILLECTOMY         Past Medical History:   Diagnosis Date    Acute coronary syndrome     Cardiomyopathy     Coronary artery disease     Hypertension      Hyperthyroidism     Osteoporosis     PAD (peripheral artery disease)     Valvular regurgitation     Mild/Mod MR post MI        reports that she quit smoking about 5 years ago. She has a 50.00 pack-year smoking history. she has never used smokeless tobacco. She reports that she does not drink alcohol or use drugs.       Family History   Problem Relation Age of Onset    Heart failure Mother     Hypothyroidism Mother     Liver disease Father     Hypertension Sister     Hypothyroidism Sister     Hypertension Brother     Asthma Daughter           Review of patient's allergies indicates:   Allergen Reactions    Plavix [clopidogrel] Rash          Medication List      ASK your doctor about these medications    acetaminophen 325 MG tablet  Commonly known as:  TYLENOL     amLODIPine 5 MG tablet  Commonly known as:  NORVASC  TAKE 1 TABLET BY MOUTH EVERY DAY     ammonium lactate 12 % Crea  Apply to feet twice daily. Avoid use between toes.     aspirin 81 MG EC tablet  Commonly known as:  ECOTRIN     atorvastatin 40 MG tablet  Commonly known as:  LIPITOR  TAKE 1 TABLET(40 MG) BY MOUTH EVERY DAY     cilostazol 50 MG Tab  Commonly known as:  PLETAL  Take 1 tablet (50 mg total) by mouth 2 (two) times daily.     guaiFENesin 600 mg 12 hr tablet  Commonly known as:  MUCINEX     metoprolol tartrate 50 MG tablet  Commonly known as:  LOPRESSOR  TAKE 1 TABLET BY MOUTH TWICE DAILY     nitroGLYCERIN 0.4 MG SL tablet  Commonly known as:  NITROSTAT  Place 1 tablet (0.4 mg total) under the tongue every 5 (five) minutes as needed for Chest pain.     * potassium chloride 8 MEQ Tbsr  Commonly known as:  KLOR-CON  TAKE 1 TABLET BY MOUTH DAILY     * potassium chloride 8 MEQ Tbsr  Commonly known as:  KLOR-CON  TAKE 1 TABLET BY MOUTH EVERY DAY     spironolactone 25 MG tablet  Commonly known as:  ALDACTONE  Take 1 tablet (25 mg total) by mouth once daily.         * This list has 2 medication(s) that are the same as other medications  prescribed for you. Read the directions carefully, and ask your doctor or other care provider to review them with you.                 furosemide  40 mg Intravenous BID    sodium chloride 0.9%  3 mL Intravenous Q8H           acetaminophen    Vitals:    11/18/18 0400 11/18/18 0638 11/18/18 0716 11/18/18 0800   BP:  108/60 117/67    BP Location:  Left arm     Patient Position:  Lying     Pulse: 91 92 95 82   Resp:  16 18    Temp:  97.7 °F (36.5 °C) 96.2 °F (35.7 °C)    TempSrc:  Oral     SpO2: 95%   97%   Weight:  87 kg (191 lb 12.8 oz)     Height:             Physical Examination:  General Appearance: No acute distress  Mental: Alert to person, time and place  HEENT: Perrl  Chest: No pain with palpitations, no chest deformities  Heart: RRR, no murmurs, no gallops or rubs  Lung: CTAB  ABDOMEN: Soft, nontender, nondistended with good bowel sounds heard. No mass or hepatosplenomegaly  EXTREMITIES: Without cyanosis, clubbing or edema.   NEUROLOGICAL: Gross nonfocal. Skin: Warm and dry without any rash. There is no costovertebral angle tenderness.   Skin: no rashes lesions or ulcers    Lab Results   Component Value Date     (L) 11/17/2018    K 4.8 11/17/2018     11/17/2018    CO2 20 (L) 11/17/2018    BUN 16 11/17/2018    CREATININE 1.0 11/17/2018     11/17/2018    HGBA1C 5.5 09/30/2014    MG 1.7 03/05/2013    AST 32 11/17/2018    ALT 30 11/17/2018    ALBUMIN 4.1 11/17/2018    PROT 8.7 (H) 11/17/2018    BILITOT 1.1 (H) 11/17/2018    WBC 11.26 11/17/2018    HGB 15.6 11/17/2018    HCT 45.4 11/17/2018    HCT 35 (L) 02/27/2013    MCV 89 11/17/2018     11/17/2018    INR 1.0 11/17/2018    TSH 0.878 04/12/2018    CHOL 106 (L) 05/27/2017    HDL 46 05/27/2017    LDLCALC 47.8 (L) 05/27/2017    TRIG 61 05/27/2017       Recent Labs   Lab 11/17/18  2153   TROPONINI 0.044*       Recent Labs   Lab 11/17/18 2153   TROPONINI 0.044*         Lab Results   Component Value Date    TSH 0.878 04/12/2018       Lab  Results   Component Value Date    HGBA1C 5.5 09/30/2014               Images and labs reviewed    ECG: NSR  Echo: normal ef with Mitral bioprosthesis appearing normal but poor views  CXR- pulmonary edema

## 2018-11-18 NOTE — ED NOTES
Report received from RENNY Paez. Assumed care of pt at this time. Pt aaox3. rr even and unlabored on 2LNC. Pt denies cp, headache, n/v, abdominal pain, bladder or bowel issues at this time. Pt connected to cardiac monitor. Pt  at bedside. Updated pt and  on plan of care. Both verbalized understanding.

## 2018-11-19 ENCOUNTER — ANESTHESIA EVENT (OUTPATIENT)
Dept: CARDIOLOGY | Facility: HOSPITAL | Age: 73
End: 2018-11-19
Payer: COMMERCIAL

## 2018-11-19 ENCOUNTER — ANESTHESIA (OUTPATIENT)
Dept: CARDIOLOGY | Facility: HOSPITAL | Age: 73
End: 2018-11-19
Payer: COMMERCIAL

## 2018-11-19 VITALS
HEART RATE: 115 BPM | TEMPERATURE: 97 F | BODY MASS INDEX: 30.76 KG/M2 | RESPIRATION RATE: 17 BRPM | OXYGEN SATURATION: 93 % | HEIGHT: 66 IN | SYSTOLIC BLOOD PRESSURE: 132 MMHG | DIASTOLIC BLOOD PRESSURE: 79 MMHG | WEIGHT: 191.38 LBS

## 2018-11-19 LAB
BSA FOR ECHO PROCEDURE: 2.03 M2
CV STRESS BASE HR: 105
DIASTOLIC BLOOD PRESSURE: 72
OHS CV CPX 1 MINUTE RECOVERY HEART RATE: 109 BPM
OHS CV CPX 85 PERCENT MAX PREDICTED HEART RATE MALE: 120
OHS CV CPX MAX PREDICTED HEART RATE: 141
OHS CV CPX PATIENT IS FEMALE: 1
OHS CV CPX PATIENT IS MALE: 0
OHS CV CPX PEAK DIASTOLIC BLOOD PRESSURE: 63 MMHG
OHS CV CPX PEAK HEAR RATE: 117
OHS CV CPX PEAK RATE PRESSURE PRODUCT: NORMAL
OHS CV CPX PEAK SYSTOLIC BLOOD PRESSURE: 127
OHS CV CPX PERCENT MAX PREDICTED HEART RATE ACHIEVED: 83
OHS CV CPX RATE PRESSURE PRODUCT PRESENTING: NORMAL
STRESS ECHO POST EXERCISE DUR MIN: 2 MIN
STRESS ECHO POST EXERCISE DUR SEC: 1
SYSTOLIC BLOOD PRESSURE: 123

## 2018-11-19 PROCEDURE — 63600175 PHARM REV CODE 636 W HCPCS: Performed by: NURSE ANESTHETIST, CERTIFIED REGISTERED

## 2018-11-19 PROCEDURE — 25000003 PHARM REV CODE 250: Performed by: NURSE ANESTHETIST, CERTIFIED REGISTERED

## 2018-11-19 PROCEDURE — 63600175 PHARM REV CODE 636 W HCPCS: Performed by: INTERNAL MEDICINE

## 2018-11-19 PROCEDURE — 37000008 HC ANESTHESIA 1ST 15 MINUTES: Performed by: INTERNAL MEDICINE

## 2018-11-19 PROCEDURE — 37000009 HC ANESTHESIA EA ADD 15 MINS: Performed by: INTERNAL MEDICINE

## 2018-11-19 PROCEDURE — A4216 STERILE WATER/SALINE, 10 ML: HCPCS | Performed by: EMERGENCY MEDICINE

## 2018-11-19 PROCEDURE — 25000003 PHARM REV CODE 250: Performed by: EMERGENCY MEDICINE

## 2018-11-19 PROCEDURE — 94761 N-INVAS EAR/PLS OXIMETRY MLT: CPT

## 2018-11-19 PROCEDURE — 63600175 PHARM REV CODE 636 W HCPCS: Performed by: EMERGENCY MEDICINE

## 2018-11-19 PROCEDURE — 63600175 PHARM REV CODE 636 W HCPCS

## 2018-11-19 PROCEDURE — G0378 HOSPITAL OBSERVATION PER HR: HCPCS

## 2018-11-19 RX ORDER — FUROSEMIDE 40 MG/1
40 TABLET ORAL DAILY
Qty: 30 TABLET | Refills: 11 | Status: ON HOLD | OUTPATIENT
Start: 2018-11-19 | End: 2018-11-29 | Stop reason: HOSPADM

## 2018-11-19 RX ORDER — SODIUM CHLORIDE 9 MG/ML
INJECTION, SOLUTION INTRAVENOUS CONTINUOUS PRN
Status: DISCONTINUED | OUTPATIENT
Start: 2018-11-19 | End: 2018-11-19

## 2018-11-19 RX ORDER — PROPOFOL 10 MG/ML
VIAL (ML) INTRAVENOUS
Status: DISCONTINUED | OUTPATIENT
Start: 2018-11-19 | End: 2018-11-19

## 2018-11-19 RX ORDER — LIDOCAINE HCL/PF 100 MG/5ML
SYRINGE (ML) INTRAVENOUS
Status: DISCONTINUED | OUTPATIENT
Start: 2018-11-19 | End: 2018-11-19

## 2018-11-19 RX ORDER — REGADENOSON 0.08 MG/ML
0.4 INJECTION, SOLUTION INTRAVENOUS ONCE
Status: COMPLETED | OUTPATIENT
Start: 2018-11-19 | End: 2018-11-19

## 2018-11-19 RX ADMIN — PROPOFOL 60 MG: 10 INJECTION, EMULSION INTRAVENOUS at 01:11

## 2018-11-19 RX ADMIN — LIDOCAINE HYDROCHLORIDE 20 MG: 20 INJECTION, SOLUTION INTRAVENOUS at 01:11

## 2018-11-19 RX ADMIN — REGADENOSON 0.4 MG: 0.08 INJECTION, SOLUTION INTRAVENOUS at 11:11

## 2018-11-19 RX ADMIN — PROPOFOL 40 MG: 10 INJECTION, EMULSION INTRAVENOUS at 01:11

## 2018-11-19 RX ADMIN — SODIUM CHLORIDE: 0.9 INJECTION, SOLUTION INTRAVENOUS at 01:11

## 2018-11-19 RX ADMIN — Medication 3 ML: at 05:11

## 2018-11-19 RX ADMIN — FUROSEMIDE 40 MG: 10 INJECTION, SOLUTION INTRAMUSCULAR; INTRAVENOUS at 09:11

## 2018-11-19 RX ADMIN — REGADENOSON 0.4 MG: 0.08 INJECTION, SOLUTION INTRAVENOUS at 09:11

## 2018-11-19 NOTE — ANESTHESIA PREPROCEDURE EVALUATION
11/19/2018  Tara Mondragon is a 72 y.o., female here for LINDA    Past Medical History:   Diagnosis Date    Acute coronary syndrome     Cardiomyopathy     Coronary artery disease     Hypertension     Hyperthyroidism     Osteoporosis     PAD (peripheral artery disease)     Valvular regurgitation     Mild/Mod MR post MI     Past Surgical History:   Procedure Laterality Date    CARDIAC VALVE SURGERY      porcine MVR    CHOLECYSTECTOMY      CORONARY ANGIOPLASTY      CORONARY ARTERY BYPASS GRAFT      CORONARY ARTERY BYPASS GRAFT (CABG) N/A 2/26/2013    [facility] 2ND FLR    REPAIR VALVE-MITRAL N/A 2/26/2013    [facility] 2ND FLR    TONSILLECTOMY           Anesthesia Evaluation    I have reviewed the Patient Summary Reports.    I have reviewed the Nursing Notes.   I have reviewed the Medications.     Review of Systems  Anesthesia Hx:  History of prior surgery of interest to airway management or planning:   Cardiovascular:   Exercise tolerance: poor Hypertension Valvular problems/Murmurs (Hx of MVR) CAD   CHF HERNANDES ECG has been reviewed.   TTE 11/17  -Concentric left ventricular hypertrophy.  -Normal left ventricular systolic function. The estimated ejection fraction is 60%  -Indeterminate left ventricular diastolic function.  -Normal right ventricular systolic function.  -There is a porcine bioprosthetic mitral valve. The prosthetic valve is normal.  -The estimated PA systolic pressure is 58.35 mm Hg  -Moderate aortic valve stenosis.  -Aortic valve area is 1.13 cm2; peak velocity is 2.83 m/s; mean gradient is 27 mmHg.   Pulmonary:   Shortness of breath    Musculoskeletal:   osteoporosis   Endocrine:   Hyperthyroidism        Physical Exam  General:  Well nourished    Airway/Jaw/Neck:  Airway Findings: Mouth Opening: Normal General  Airway Assessment: Adult  Mallampati: III  Improves to II with phonation.     Eyes/Ears/Nose:  EYES/EARS/NOSE FINDINGS: Normal   Dental:  Dental Findings: upper partial dentures, lower partial denturesPoor dentition   Chest/Lungs:  Chest/Lungs Findings: Clear to auscultation, Normal Respiratory Rate     Heart/Vascular:  Heart Murmur  Systolic Heart Murmur Description: Holosystolic  Systolic Heart Murmur Grade: Grade IV or Greater        Mental Status:  Mental Status Findings: Normal        Anesthesia Plan  Type of Anesthesia, risks & benefits discussed:  Anesthesia Type:  general, MAC  Patient's Preference:   Intra-op Monitoring Plan:   Intra-op Monitoring Plan Comments:   Post Op Pain Control Plan:   Post Op Pain Control Plan Comments:   Induction:    Beta Blocker:         Informed Consent: Patient understands risks and agrees with Anesthesia plan.  Questions answered. Anesthesia consent signed with patient.  ASA Score: 4     Day of Surgery Review of History & Physical:            Ready For Surgery From Anesthesia Perspective.

## 2018-11-19 NOTE — PLAN OF CARE
Future Appointments   Date Time Provider Department Center   11/27/2018  8:00 AM [facility] NM2 INJ [facility] NUCLEAR [facility]   11/27/2018  8:30 AM [facility] NM1 [facility] NUCLEAR [facility]   11/27/2018  9:00 AM CARDIOLOGY, STRESS/TILT TABLE/TAYLOR [facility] CARD [facility]   11/27/2018 10:00 AM CARDIOLOGY, ECHO [facility] CARD [facility]   11/27/2018  1:00 PM Jaun Mata MD [facility] FAM MED [facility]   1/17/2019  8:00 AM Qing Duffy MD [facility] CARDIO [facility]     Discharge rounds on patient. Discussed followup appointments, blue discharge folder, discharge nurse will go over home medications and reasons for medications and final discharge instructions. All patient/caregiver questions answered. Patient verbalized understanding.         11/19/18 1705   Final Note   Assessment Type Final Discharge Note   Anticipated Discharge Disposition Home   Hospital Follow Up  Appt(s) scheduled? Yes   Discharge plans and expectations educations in teach back method with documentation complete? Yes   Right Care Referral Info   Post Acute Recommendation No Care     Marla Medrano, RN, CCM, CMSRN  RN Transition Navigator  [phone]

## 2018-11-19 NOTE — PROGRESS NOTES
LINDA recovery completed. Pt aaox4, vss, neuro intact. Denies pain or any other discomfort. Pt denies NV, tolerated clear liquid diet. Pt ambulated from stretcher to w/c w minimal to no assistance. Report given to Suyapa LAWSON, who verbalized full understanding of report and denied having questions. Pt transported back to room via w/c on tele box. NAD.

## 2018-11-19 NOTE — TRANSFER OF CARE
"Anesthesia Transfer of Care Note    Patient: Tara Mondragon    Procedure(s) Performed: Procedure(s) (LRB):  ECHOCARDIOGRAM, TRANSESOPHAGEAL (N/A)    Patient location: Cath Lab    Anesthesia Type: MAC    Transport from OR: Transported from OR on 2-3 L/min O2 by NC with adequate spontaneous ventilation    Post pain: adequate analgesia    Post assessment: no apparent anesthetic complications and tolerated procedure well    Post vital signs: stable    Level of consciousness: awake, oriented and alert    Nausea/Vomiting: no nausea/vomiting    Complications: none    Transfer of care protocol was followed      Last vitals:   Visit Vitals  /66   Pulse 105   Temp 36.3 °C (97.4 °F)   Resp 18   Ht 5' 6" (1.676 m)   Wt 86.8 kg (191 lb 5.8 oz)   SpO2 95%   Breastfeeding? No   BMI 30.89 kg/m²     "

## 2018-11-19 NOTE — PLAN OF CARE
11/19/18 1704   Discharge Assessment   Assessment Type Discharge Planning Assessment   Confirmed/corrected address and phone number on facesheet? Yes   Assessment information obtained from? Patient   Prior to hospitilization cognitive status: Alert/Oriented   Prior to hospitalization functional status: Independent   Current cognitive status: Alert/Oriented   Current Functional Status: Independent   Lives With spouse   Able to Return to Prior Arrangements yes   Is patient able to care for self after discharge? Yes   Patient's perception of discharge disposition home or selfcare   Readmission Within The Last 30 Days no previous admission in last 30 days   Patient currently being followed by outpatient case management? Yes   Equipment Currently Used at Home none   Do you have any problems affording any of your prescribed medications? No   Is the patient taking medications as prescribed? yes   Transportation Available family or friend will provide   Discharge Plan A Home   Discharge Plan B Home   Patient/Family In Agreement With Plan yes     Marla Medrano RN, CCM, CMSRN  RN Transition Navigator  [phone]

## 2018-11-19 NOTE — CONSULTS
Future Appointments   Date Time Provider Department Center   11/27/2018  8:00 AM [facility] NM2 INJ [facility] NUCLEAR [facility]   11/27/2018  8:30 AM [facility] NM1 [facility] NUCLEAR [facility]   11/27/2018  9:00 AM CARDIOLOGY, STRESS/TILT TABLE/TAYLOR [facility] CARD [facility]   11/27/2018 10:00 AM CARDIOLOGY, ECHO [facility]   11/27/2018  1:00 PM Jaun Mata MD [facility] FAM MED [facility]   1/17/2019  8:00 AM Qing Duffy MD [facility] CARDIO [facility]

## 2018-11-19 NOTE — H&P
Admit Diagnosis:    Procedure:  Procedure(s) and Anesthesia Type:     * ECHOCARDIOGRAM, TRANSESOPHAGEAL - Local MAC    Indication: MVR & moderate AS      ASA classification:    The ASA score is a subjective assessment of a patients overall health that is based on five classes (I to V).  Patient has severe systemic disease that is not incapacitating-III      Malampatti Score:  Modified Mallampati classification    Class IV: Soft palate not visible at all    H&P reviewed and and patient seen and evaluated and there is no changes from patient's last visit.     Vitals:    11/19/18 0735 11/19/18 1200 11/19/18 1202 11/19/18 1240   BP:   119/66    BP Location:       Patient Position:       Pulse: 105 109 105    Resp:   18    Temp:   97.4 °F (36.3 °C)    TempSrc:       SpO2:    95%   Weight:       Height:           Physical Examination:  General Appearance: No acute distress  Mental: Alert to person, time and place  HEENT: Perrl  Chest: No pain with palpitations, no chest deformities  Heart: RRR, + 4/4 murmurs, no gallops or rubs  Lung: CTAB  ABDOMEN: Soft, nontender, nondistended with good bowel sounds heard. No mass or hepatosplenomegaly  EXTREMITIES: Without cyanosis, clubbing or edema.     Procedural risk and benefits were explained to patient in details. Patient agree to consent to the procedure after verbalizing understanding of risks which include bleeding, infections, damage of certain organs or vessels and possibly death.    Pre-op Diagnosis: H/O mitral valve replacement [Z95.2]  Shortness of breath [R06.02]  Acute congestive heart failure, unspecified heart failure type [I50.9]  NSTEMI (non-ST elevated myocardial infarction) [I21.4]      Procedure(s):  ECHOCARDIOGRAM, TRANSESOPHAGEAL      verbalized understanding.

## 2018-11-19 NOTE — PLAN OF CARE
Problem: Patient Care Overview  Goal: Plan of Care Review  Outcome: Ongoing (interventions implemented as appropriate)  Pt IV and tele box removed. Education on diet, medications and follow up care given. Pt verbalized understanding of dc instructions. Medications called into pt pharmacy. melissa hopper distress noted. Will continue to monitor.

## 2018-11-19 NOTE — ANESTHESIA POSTPROCEDURE EVALUATION
"Anesthesia Post Evaluation    Patient: Tara Mondragon    Procedure(s) Performed: Procedure(s) (LRB):  ECHOCARDIOGRAM, TRANSESOPHAGEAL (N/A)    Final Anesthesia Type: MAC  Patient location during evaluation: Cath Lab  Patient participation: Yes- Able to Participate  Level of consciousness: awake and alert  Post-procedure vital signs: reviewed and stable  Pain management: adequate  Airway patency: patent  PONV status at discharge: No PONV  Anesthetic complications: no      Cardiovascular status: blood pressure returned to baseline  Respiratory status: unassisted  Hydration status: euvolemic  Follow-up not needed.        Visit Vitals  /66   Pulse 105   Temp 36.3 °C (97.4 °F)   Resp 18   Ht 5' 6" (1.676 m)   Wt 86.8 kg (191 lb 5.8 oz)   SpO2 95%   Breastfeeding? No   BMI 30.89 kg/m²       Pain/Sunita Score: Pain Assessment Performed: Yes (11/19/2018 11:00 AM)  Presence of Pain: denies (11/19/2018 11:00 AM)  Pain Rating Prior to Med Admin: 6 (11/18/2018  3:35 PM)  Sunita Score: 10 (11/19/2018  1:14 PM)        "

## 2018-11-20 NOTE — DISCHARGE SUMMARY
DC Summary    Admission Date: 11/17/2018  Discharge Date: 11/19/2018  Attending Physician: Qing Duffy      Condition on Discharge: Stable    Final Diagnosis:   Acute decompensated HF with preserved ef. MVR, Mild AS  Procedures:   LINDA to evaluate MVR  History of Present Illness : Refer to H&P admission note  Laboratory/Data :   Lab Results   Component Value Date     (L) 11/17/2018    K 4.8 11/17/2018     11/17/2018    CO2 20 (L) 11/17/2018    BUN 16 11/17/2018    CREATININE 1.0 11/17/2018     11/17/2018    HGBA1C 5.5 09/30/2014    MG 1.7 03/05/2013    AST 32 11/17/2018    ALT 30 11/17/2018    ALBUMIN 4.1 11/17/2018    PROT 8.7 (H) 11/17/2018    BILITOT 1.1 (H) 11/17/2018    WBC 11.26 11/17/2018    HGB 15.6 11/17/2018    HCT 45.4 11/17/2018    HCT 35 (L) 02/27/2013    MCV 89 11/17/2018     11/17/2018    INR 1.0 11/17/2018    TSH 0.878 04/12/2018    CHOL 106 (L) 05/27/2017    HDL 46 05/27/2017    LDLCALC 47.8 (L) 05/27/2017    TRIG 61 05/27/2017       No results for input(s): CPK, CPKMB, TROPONINI, MB in the last 24 hours.    Recent Labs   Lab 11/17/18  2153   TROPONINI 0.044*         Lab Results   Component Value Date    TSH 0.878 04/12/2018       Lab Results   Component Value Date    HGBA1C 5.5 09/30/2014         Hospital Course:  Assessment:     1. Acute decompensated diastolic CHF with Pulmonary HTN  2. NSTEMI  3. CAD s/p CABG  4. HTN- controlled.   5. Moderate Aortic Stenosis           Plan:     Continue lasix 40 mg IV bid with strict I/Os  Nuclear stress   LINDA in am to better evaluate MVR and Aortic valve that appear to have moderate AS which could be worse,   Daily weights  Low salt diet discussed with patient again      LINDA showed moderate MR of bioprosthetic valve with mild AS. Nuclear stress negative for ischemia. She felt well after IV diuresis and was stable for discharge.      Discharge Medications:    Lindsay, Tara C   Home Medication Instructions NICOLASA:[id number]    Printed  on:11/20/18 5304   Medication Information                      acetaminophen (TYLENOL) 325 MG tablet  Take 325 mg by mouth once as needed for Pain.             amLODIPine (NORVASC) 5 MG tablet  TAKE 1 TABLET BY MOUTH EVERY DAY             ammonium lactate 12 % Crea  Apply to feet twice daily. Avoid use between toes.             aspirin (ECOTRIN) 81 MG EC tablet  Take 1 tablet by mouth Daily.             atorvastatin (LIPITOR) 40 MG tablet  TAKE 1 TABLET(40 MG) BY MOUTH EVERY DAY             cilostazol (PLETAL) 50 MG Tab  Take 1 tablet (50 mg total) by mouth 2 (two) times daily.             furosemide (LASIX) 40 MG tablet  Take 1 tablet (40 mg total) by mouth once daily.             guaifenesin (MUCINEX) 600 mg 12 hr tablet  Take 1,200 mg by mouth once as needed for Congestion.             metoprolol tartrate (LOPRESSOR) 50 MG tablet  TAKE 1 TABLET BY MOUTH TWICE DAILY             nitroGLYCERIN (NITROSTAT) 0.4 MG SL tablet  Place 1 tablet (0.4 mg total) under the tongue every 5 (five) minutes as needed for Chest pain.             potassium chloride (KLOR-CON) 8 MEQ TbSR  TAKE 1 TABLET BY MOUTH DAILY             potassium chloride (KLOR-CON) 8 MEQ TbSR  TAKE 1 TABLET BY MOUTH EVERY DAY             spironolactone (ALDACTONE) 25 MG tablet  Take 1 tablet (25 mg total) by mouth once daily.               Discharge Instructions: Heart healthy diet, compliance with medications  Follow up Appointments: f/u with Dr. Duffy in 2 weeks    Disposition: discharge HOME

## 2018-11-21 ENCOUNTER — TELEPHONE (OUTPATIENT)
Dept: CARDIOLOGY | Facility: CLINIC | Age: 73
End: 2018-11-21

## 2018-11-21 DIAGNOSIS — I25.10 CORONARY ARTERY DISEASE WITHOUT ANGINA PECTORIS, UNSPECIFIED VESSEL OR LESION TYPE, UNSPECIFIED WHETHER NATIVE OR TRANSPLANTED HEART: Primary | ICD-10-CM

## 2018-11-21 NOTE — TELEPHONE ENCOUNTER
Patient called to say that she is having shortness of breath sitting and on exertion,.  Please advise.

## 2018-11-23 ENCOUNTER — TELEPHONE (OUTPATIENT)
Dept: CARDIOLOGY | Facility: CLINIC | Age: 73
End: 2018-11-23

## 2018-11-23 ENCOUNTER — HOSPITAL ENCOUNTER (INPATIENT)
Facility: HOSPITAL | Age: 73
LOS: 3 days | Discharge: HOME | DRG: 286 | End: 2018-11-29
Attending: EMERGENCY MEDICINE | Admitting: STUDENT IN AN ORGANIZED HEALTH CARE EDUCATION/TRAINING PROGRAM
Payer: COMMERCIAL

## 2018-11-23 DIAGNOSIS — I50.9 CONGESTIVE HEART FAILURE, UNSPECIFIED HF CHRONICITY, UNSPECIFIED HEART FAILURE TYPE: Primary | ICD-10-CM

## 2018-11-23 DIAGNOSIS — Z95.1 S/P CABG X 2: ICD-10-CM

## 2018-11-23 DIAGNOSIS — I50.33 ACUTE ON CHRONIC DIASTOLIC HEART FAILURE: ICD-10-CM

## 2018-11-23 DIAGNOSIS — I73.9 PAD (PERIPHERAL ARTERY DISEASE): ICD-10-CM

## 2018-11-23 DIAGNOSIS — I42.9 CARDIOMYOPATHY, SECONDARY: ICD-10-CM

## 2018-11-23 DIAGNOSIS — R07.9 CHEST PAIN: ICD-10-CM

## 2018-11-23 DIAGNOSIS — I25.10 CORONARY ARTERY DISEASE INVOLVING NATIVE CORONARY ARTERY OF NATIVE HEART WITHOUT ANGINA PECTORIS: ICD-10-CM

## 2018-11-23 DIAGNOSIS — I10 ESSENTIAL HYPERTENSION: ICD-10-CM

## 2018-11-23 DIAGNOSIS — Z95.2 S/P MVR (MITRAL VALVE REPLACEMENT): ICD-10-CM

## 2018-11-23 DIAGNOSIS — R06.02 SOB (SHORTNESS OF BREATH): ICD-10-CM

## 2018-11-23 DIAGNOSIS — I51.89 LEFT VENTRICULAR DIASTOLIC DYSFUNCTION, NYHA CLASS 3: ICD-10-CM

## 2018-11-23 DIAGNOSIS — R06.02 SHORTNESS OF BREATH: ICD-10-CM

## 2018-11-23 LAB
ALBUMIN SERPL BCP-MCNC: 3.6 G/DL
ALP SERPL-CCNC: 103 U/L
ALT SERPL W/O P-5'-P-CCNC: 22 U/L
ANION GAP SERPL CALC-SCNC: 11 MMOL/L
AST SERPL-CCNC: 20 U/L
BASOPHILS # BLD AUTO: 0.02 K/UL
BASOPHILS NFR BLD: 0.1 %
BILIRUB SERPL-MCNC: 1.3 MG/DL
BILIRUB UR QL STRIP: NEGATIVE
BNP SERPL-MCNC: 296 PG/ML
BUN SERPL-MCNC: 21 MG/DL
CALCIUM SERPL-MCNC: 9 MG/DL
CHLORIDE SERPL-SCNC: 95 MMOL/L
CK SERPL-CCNC: 55 U/L
CLARITY UR: CLEAR
CO2 SERPL-SCNC: 26 MMOL/L
COLOR UR: YELLOW
CREAT SERPL-MCNC: 1 MG/DL
D DIMER PPP IA.FEU-MCNC: 1.5 MG/L FEU
DIFFERENTIAL METHOD: ABNORMAL
EOSINOPHIL # BLD AUTO: 0.1 K/UL
EOSINOPHIL NFR BLD: 0.4 %
ERYTHROCYTE [DISTWIDTH] IN BLOOD BY AUTOMATED COUNT: 12.4 %
EST. GFR  (AFRICAN AMERICAN): >60 ML/MIN/1.73 M^2
EST. GFR  (NON AFRICAN AMERICAN): 56 ML/MIN/1.73 M^2
GLUCOSE SERPL-MCNC: 126 MG/DL
GLUCOSE UR QL STRIP: NEGATIVE
HCT VFR BLD AUTO: 38.7 %
HGB BLD-MCNC: 13.4 G/DL
HGB UR QL STRIP: NEGATIVE
KETONES UR QL STRIP: NEGATIVE
LEUKOCYTE ESTERASE UR QL STRIP: NEGATIVE
LYMPHOCYTES # BLD AUTO: 2 K/UL
LYMPHOCYTES NFR BLD: 15.2 %
MCH RBC QN AUTO: 30.5 PG
MCHC RBC AUTO-ENTMCNC: 34.6 G/DL
MCV RBC AUTO: 88 FL
MONOCYTES # BLD AUTO: 0.1 K/UL
MONOCYTES NFR BLD: 1 %
NEUTROPHILS # BLD AUTO: 11.1 K/UL
NEUTROPHILS NFR BLD: 83 %
NITRITE UR QL STRIP: NEGATIVE
PH UR STRIP: 6 [PH] (ref 5–8)
PLATELET # BLD AUTO: 205 K/UL
PMV BLD AUTO: 10.4 FL
POTASSIUM SERPL-SCNC: 3.5 MMOL/L
PROT SERPL-MCNC: 6.8 G/DL
PROT UR QL STRIP: NEGATIVE
RBC # BLD AUTO: 4.4 M/UL
SODIUM SERPL-SCNC: 132 MMOL/L
SP GR UR STRIP: 1.01 (ref 1–1.03)
TROPONIN I SERPL DL<=0.01 NG/ML-MCNC: 0.01 NG/ML
URN SPEC COLLECT METH UR: NORMAL
UROBILINOGEN UR STRIP-ACNC: NEGATIVE EU/DL
WBC # BLD AUTO: 13.44 K/UL

## 2018-11-23 PROCEDURE — 94761 N-INVAS EAR/PLS OXIMETRY MLT: CPT

## 2018-11-23 PROCEDURE — 81003 URINALYSIS AUTO W/O SCOPE: CPT

## 2018-11-23 PROCEDURE — 99284 EMERGENCY DEPT VISIT MOD MDM: CPT | Mod: 25

## 2018-11-23 PROCEDURE — 27000221 HC OXYGEN, UP TO 24 HOURS

## 2018-11-23 PROCEDURE — G0378 HOSPITAL OBSERVATION PER HR: HCPCS

## 2018-11-23 PROCEDURE — 80053 COMPREHEN METABOLIC PANEL: CPT

## 2018-11-23 PROCEDURE — 84484 ASSAY OF TROPONIN QUANT: CPT

## 2018-11-23 PROCEDURE — 93005 ELECTROCARDIOGRAM TRACING: CPT

## 2018-11-23 PROCEDURE — 99285 EMERGENCY DEPT VISIT HI MDM: CPT | Mod: 25

## 2018-11-23 PROCEDURE — 63600175 PHARM REV CODE 636 W HCPCS: Performed by: NURSE PRACTITIONER

## 2018-11-23 PROCEDURE — 25000003 PHARM REV CODE 250: Performed by: NURSE PRACTITIONER

## 2018-11-23 PROCEDURE — 99218 PR INITIAL OBSERVATION CARE,LEVL I: CPT | Mod: AI,,, | Performed by: STUDENT IN AN ORGANIZED HEALTH CARE EDUCATION/TRAINING PROGRAM

## 2018-11-23 PROCEDURE — 96374 THER/PROPH/DIAG INJ IV PUSH: CPT

## 2018-11-23 PROCEDURE — 93010 ELECTROCARDIOGRAM REPORT: CPT | Mod: ,,, | Performed by: INTERNAL MEDICINE

## 2018-11-23 PROCEDURE — 63600175 PHARM REV CODE 636 W HCPCS: Performed by: EMERGENCY MEDICINE

## 2018-11-23 PROCEDURE — 83880 ASSAY OF NATRIURETIC PEPTIDE: CPT

## 2018-11-23 PROCEDURE — 85025 COMPLETE CBC W/AUTO DIFF WBC: CPT

## 2018-11-23 PROCEDURE — 85379 FIBRIN DEGRADATION QUANT: CPT

## 2018-11-23 PROCEDURE — 82550 ASSAY OF CK (CPK): CPT

## 2018-11-23 RX ORDER — AMLODIPINE BESYLATE 5 MG/1
5 TABLET ORAL DAILY
Status: DISCONTINUED | OUTPATIENT
Start: 2018-11-23 | End: 2018-11-29 | Stop reason: HOSPADM

## 2018-11-23 RX ORDER — ENOXAPARIN SODIUM 100 MG/ML
40 INJECTION SUBCUTANEOUS EVERY 24 HOURS
Status: DISCONTINUED | OUTPATIENT
Start: 2018-11-23 | End: 2018-11-29 | Stop reason: HOSPADM

## 2018-11-23 RX ORDER — ATORVASTATIN CALCIUM 40 MG/1
40 TABLET, FILM COATED ORAL DAILY
Status: DISCONTINUED | OUTPATIENT
Start: 2018-11-23 | End: 2018-11-29 | Stop reason: HOSPADM

## 2018-11-23 RX ORDER — SODIUM CHLORIDE 0.9 % (FLUSH) 0.9 %
3 SYRINGE (ML) INJECTION
Status: DISCONTINUED | OUTPATIENT
Start: 2018-11-23 | End: 2018-11-29 | Stop reason: HOSPADM

## 2018-11-23 RX ORDER — GUAIFENESIN 600 MG/1
1200 TABLET, EXTENDED RELEASE ORAL ONCE AS NEEDED
Status: ACTIVE | OUTPATIENT
Start: 2018-11-23 | End: 2018-11-23

## 2018-11-23 RX ORDER — METOPROLOL TARTRATE 50 MG/1
50 TABLET ORAL 2 TIMES DAILY
Status: DISCONTINUED | OUTPATIENT
Start: 2018-11-23 | End: 2018-11-29

## 2018-11-23 RX ORDER — PANTOPRAZOLE SODIUM 40 MG/1
40 TABLET, DELAYED RELEASE ORAL DAILY
Status: DISCONTINUED | OUTPATIENT
Start: 2018-11-23 | End: 2018-11-29 | Stop reason: HOSPADM

## 2018-11-23 RX ORDER — ASPIRIN 81 MG/1
81 TABLET ORAL DAILY
Status: DISCONTINUED | OUTPATIENT
Start: 2018-11-23 | End: 2018-11-29 | Stop reason: HOSPADM

## 2018-11-23 RX ORDER — SPIRONOLACTONE 25 MG/1
25 TABLET ORAL DAILY
Status: DISCONTINUED | OUTPATIENT
Start: 2018-11-23 | End: 2018-11-29 | Stop reason: HOSPADM

## 2018-11-23 RX ORDER — FUROSEMIDE 10 MG/ML
80 INJECTION INTRAMUSCULAR; INTRAVENOUS 2 TIMES DAILY
Status: DISCONTINUED | OUTPATIENT
Start: 2018-11-23 | End: 2018-11-29

## 2018-11-23 RX ORDER — RAMELTEON 8 MG/1
8 TABLET ORAL NIGHTLY PRN
Status: DISCONTINUED | OUTPATIENT
Start: 2018-11-23 | End: 2018-11-29 | Stop reason: HOSPADM

## 2018-11-23 RX ORDER — FUROSEMIDE 10 MG/ML
80 INJECTION INTRAMUSCULAR; INTRAVENOUS
Status: COMPLETED | OUTPATIENT
Start: 2018-11-23 | End: 2018-11-23

## 2018-11-23 RX ORDER — POLYETHYLENE GLYCOL 3350 17 G/17G
17 POWDER, FOR SOLUTION ORAL 2 TIMES DAILY PRN
Status: DISCONTINUED | OUTPATIENT
Start: 2018-11-23 | End: 2018-11-29 | Stop reason: HOSPADM

## 2018-11-23 RX ORDER — ACETAMINOPHEN 325 MG/1
650 TABLET ORAL ONCE AS NEEDED
Status: ACTIVE | OUTPATIENT
Start: 2018-11-23 | End: 2018-11-23

## 2018-11-23 RX ADMIN — FUROSEMIDE 80 MG: 10 INJECTION, SOLUTION INTRAMUSCULAR; INTRAVENOUS at 07:11

## 2018-11-23 RX ADMIN — METOPROLOL TARTRATE 50 MG: 50 TABLET ORAL at 08:11

## 2018-11-23 RX ADMIN — FUROSEMIDE 80 MG: 10 INJECTION, SOLUTION INTRAMUSCULAR; INTRAVENOUS at 04:11

## 2018-11-23 RX ADMIN — ENOXAPARIN SODIUM 40 MG: 100 INJECTION SUBCUTANEOUS at 04:11

## 2018-11-23 NOTE — ED NOTES
Report received from RENNY Ferris. Assumed care of this patient at this time. Pt is AAO x 3. Pt denies any SOB, chest pain, nausea, vomiting, or dizziness. Pt repositioned for comfort. Pt denies any pain.

## 2018-11-23 NOTE — Clinical Note
Catheter is inserted into the LIMA graft. The vessel(s) were injected and visualized in multiple views.

## 2018-11-23 NOTE — ED NOTES
Pt report received from RENNY Sabillon. Pt is on 2L oxygen via nasal cannula. Pt denies sob at rest. Pt is on cardiac monitor, BP cuff, and pulse ox. Will continue to monitor.

## 2018-11-23 NOTE — Clinical Note
The site was marked. Prepped: groin and right brachial. Prepped with: ChloraPrep. The site was clipped. The patient was draped.

## 2018-11-23 NOTE — Clinical Note
150 ml injected throughout the case. -60 mL total wasted during the case. 90 mL total used in the case.

## 2018-11-23 NOTE — ED NOTES
Pt report received from RENNY Sabillon. Pt resting on 2L oxygen via nasal cannula. Pt reports that she feels better with the oxygen. Pt reports that at rest she does not have sob. Pt provided bedside commode at bedside. Pt given lasix and Dr. Fall at bedside for pt plan of care. Nonskid socks applied to pt.

## 2018-11-23 NOTE — TELEPHONE ENCOUNTER
I spoke with patient she has been admitted to the hospital and her concerns have been taken care of.

## 2018-11-23 NOTE — Clinical Note
Catheter is inserted into the ostium right coronary artery. The vessel(s) were injected and visualized in multiple views.

## 2018-11-23 NOTE — Clinical Note
The PA catheter is repositioned to the right pulmonary artery. Hemodynamics performed. Oxygen saturation obtained at 58%.

## 2018-11-23 NOTE — ASSESSMENT & PLAN NOTE
-porcine MVR 2/2013  -recent TTE with well seated valve; LINDA earlier this week for further evaluation of mitral valve with moderate MR

## 2018-11-23 NOTE — TELEPHONE ENCOUNTER
----- Message from Christine Snyder sent at 11/21/2018  3:43 PM CST -----  Contact: self / [phone]  Patient is requesting a call back regarding, the below medications. Please advise       furosemide (LASIX) 40 MG tablet  spironolactone (ALDACTONE) 25 MG tablet

## 2018-11-23 NOTE — HPI
73yo female with history of CAD s/p 2V CABG 2/2013 (LIMA-LAD; SVG-PDA) & MR s/p MVR 2/2013 (27mm [employer] porcine valve), diastolic dysfunction, PAD (occluded LSFA and left PFA on C 2/2013),  HTN, HLP and hypothyroidism who presents to the ER with complaints of SOB. Ms. Mondragon was admitted last week for similar symptoms and underwent stress test (no evidence of ischemia) and LINDA (moderate MR and mild AS). Her symptoms were felt to be related to dietary indiscretion of salt intake. She was discharged home on Lasix 40mg po daily. She was feeling well the day of discharge and was able to ambulate around the unit with resolution of SOB. She was feeling well upon discharge and was able to do full activities. However her SOB returned on Wednesday with progressive worsening. She called Dr. Duffy't office on Wednesday and was instructed to increase her Lasix to BID. She reports the SOB did not improve and worsened prompting her to present to the ER this morning. She reports eating pizza on Tuesday and went out to eat yesterday for Thanksgiving despite her instructions for low salt diet upon discharge. She denies any chest pain, palpitations or dizziness.

## 2018-11-23 NOTE — ASSESSMENT & PLAN NOTE
-recent TTE in 11/2018 with normal LVEF and indeterminate diastolic dysfunction; previous echo in 2013 with diastolic dysfunction  -related to dietary indiscretion of salt intake; reinforced importance of strict adherence to low salt diet  -volume overload on exam and pulmonary edema on CXR; given IV Lasix in the ER; will place on Lasix 80mg IV BID and admit to observation for ADHF  -continue Norvasc, Metoprolol and Aldactone; will consult CHF education nurse for dietary education/reinforcement

## 2018-11-23 NOTE — PLAN OF CARE
Problem: Patient Care Overview  Goal: Plan of Care Review  Outcome: Ongoing (interventions implemented as appropriate)   11/23/18 0665   Coping/Psychosocial   Plan Of Care Reviewed With patient;daughter   Patient is awake, alert, and orient. Patient is is in tele, no ectopy noted. Patient vitals are stable. Patient on diuretics, I/O monitored. Patient is on 2 L of oxygen via nasal cannula. Patient bed alarm is on, bed in the lowest position, and call light within reach.

## 2018-11-23 NOTE — SUBJECTIVE & OBJECTIVE
Past Medical History:   Diagnosis Date    Acute coronary syndrome     Cardiomyopathy     Coronary artery disease     Hypertension     Hyperthyroidism     Osteoporosis     PAD (peripheral artery disease)     Valvular regurgitation     Mild/Mod MR post MI       Past Surgical History:   Procedure Laterality Date    CARDIAC VALVE SURGERY      porcine MVR    CHOLECYSTECTOMY      CORONARY ANGIOPLASTY      CORONARY ARTERY BYPASS GRAFT      CORONARY ARTERY BYPASS GRAFT (CABG) N/A 2/26/2013    Performed by [facility] 2ND FLR    REPAIR VALVE-MITRAL N/A 2/26/2013    Performed by Damien [facility] MD at [facility] OR 2ND FLR    TONSILLECTOMY         Review of patient's allergies indicates:   Allergen Reactions    Plavix [clopidogrel] Rash       No current facility-administered medications on file prior to encounter.      Current Outpatient Medications on File Prior to Encounter   Medication Sig    acetaminophen (TYLENOL) 325 MG tablet Take 325 mg by mouth once as needed for Pain.    amLODIPine (NORVASC) 5 MG tablet TAKE 1 TABLET BY MOUTH EVERY DAY    ammonium lactate 12 % Crea Apply to feet twice daily. Avoid use between toes.    aspirin (ECOTRIN) 81 MG EC tablet Take 1 tablet by mouth Daily.    atorvastatin (LIPITOR) 40 MG tablet TAKE 1 TABLET(40 MG) BY MOUTH EVERY DAY    cilostazol (PLETAL) 50 MG Tab Take 1 tablet (50 mg total) by mouth 2 (two) times daily.    furosemide (LASIX) 40 MG tablet Take 1 tablet (40 mg total) by mouth once daily. (Patient taking differently: Take 40 mg by mouth 2 (two) times daily. )    guaifenesin (MUCINEX) 600 mg 12 hr tablet Take 1,200 mg by mouth once as needed for Congestion.    metoprolol tartrate (LOPRESSOR) 50 MG tablet TAKE 1 TABLET BY MOUTH TWICE DAILY    nitroGLYCERIN (NITROSTAT) 0.4 MG SL tablet Place 1 tablet (0.4 mg total) under the tongue every 5 (five) minutes as needed for Chest pain.    potassium chloride (KLOR-CON) 8 MEQ TbSR TAKE 1  TABLET BY MOUTH DAILY    potassium chloride (KLOR-CON) 8 MEQ TbSR TAKE 1 TABLET BY MOUTH EVERY DAY    spironolactone (ALDACTONE) 25 MG tablet Take 1 tablet (25 mg total) by mouth once daily.     Family History     Problem Relation (Age of Onset)    Asthma Daughter    Heart failure Mother    Hypertension Sister, Brother    Hypothyroidism Mother, Sister    Liver disease Father        Tobacco Use    Smoking status: Former Smoker     Packs/day: 1.00     Years: 50.00     Pack years: 50.00     Last attempt to quit: 2/3/2013     Years since quittin.8    Smokeless tobacco: Never Used   Substance and Sexual Activity    Alcohol use: No    Drug use: No    Sexual activity: Not on file     Review of Systems   Constitution: Negative for chills, decreased appetite, diaphoresis, fever and weakness.   Cardiovascular: Positive for dyspnea on exertion. Negative for chest pain, claudication, cyanosis, irregular heartbeat, leg swelling, near-syncope, orthopnea, palpitations, paroxysmal nocturnal dyspnea and syncope.   Respiratory: Negative for cough, hemoptysis, shortness of breath and wheezing.    Gastrointestinal: Negative for bloating, abdominal pain, constipation, diarrhea, melena, nausea and vomiting.   Neurological: Negative for dizziness.     Objective:     Vital Signs (Most Recent):  Temp: 98.8 °F (37.1 °C) (18 08)  Pulse: 84 (18 08)  Resp: (!) 22 (18)  BP: (!) 104/56 (18)  SpO2: 95 % (18) Vital Signs (24h Range):  Temp:  [98.1 °F (36.7 °C)-98.8 °F (37.1 °C)] 98.8 °F (37.1 °C)  Pulse:  [83-96] 84  Resp:  [20-22] 22  SpO2:  [89 %-95 %] 95 %  BP: (101-118)/(56-63) 104/56     Weight: 87.1 kg (192 lb)  Body mass index is 30.99 kg/m².    SpO2: 95 %  O2 Device (Oxygen Therapy): nasal cannula      Intake/Output Summary (Last 24 hours) at 2018 0950  Last data filed at 2018 0925  Gross per 24 hour   Intake --   Output 250 ml   Net -250 ml       Lines/Drains/Airways      Peripheral Intravenous Line                 Peripheral IV - Single Lumen 11/23/18 0632 Right Antecubital less than 1 day                Physical Exam   Constitutional: She is oriented to person, place, and time. She appears well-developed and well-nourished. No distress.   Cardiovascular: Normal rate and regular rhythm. Exam reveals no gallop.   No murmur heard.  Pulmonary/Chest: Effort normal. Tachypnea noted. No respiratory distress. She has decreased breath sounds. She has no wheezes.   Abdominal: Soft. Bowel sounds are normal. She exhibits no distension. There is no tenderness.   Neurological: She is alert and oriented to person, place, and time.   Skin: Skin is warm and dry.       Significant Labs:     Recent Labs   Lab 11/23/18  0640   *   K 3.5   CL 95   CO2 26   BUN 21   CREATININE 1.0     Recent Labs   Lab 11/23/18  0640   WBC 13.44*   RBC 4.40   HGB 13.4   HCT 38.7      MCV 88   MCH 30.5   MCHC 34.6     Recent Labs   Lab 11/23/18  0640   CPK 55   TROPONINI 0.010       Significant Imaging: Echocardiogram:   Transthoracic echo (TTE) complete (Cupid Only):   Results for orders placed or performed during the hospital encounter of 11/17/18   Transthoracic echo (TTE) complete (Cupid Only)   Result Value Ref Range    BSA 1.95 m2    LA WIDTH 3.80 cm    LVIDD 4.45 3.5 - 6.0 cm    IVS 1.19 (A) 0.6 - 1.1 cm    PW 1.25 (A) 0.6 - 1.1 cm    Ao root annulus 2.67 cm    LVIDS 2.38 2.1 - 4.0 cm    FS 47 28 - 44 %    LA volume 70.05 cm3    LV mass 199.42 g    LA size 4.49 cm    RVDD 2.59 cm    Left Ventricle Relative Wall Thickness 0.56 cm    AV mean gradient 27 mmHg    AV valve area 1.13 cm2    E/A ratio 1.28     E wave decelartion time 220.46 msec    LVOT diameter 1.90 cm    LVOT area 2.83 cm2    LVOT peak VTI 29.83 cm    Ao peak tristan 2.83 m/s    Ao VTI 75.00 cm    LVOT stroke volume 84.53 cm3    AV peak gradient 32.04 mmHg    MV Peak E Tristan 2.16 m/s    TR Max Tristan 3.72 m/s    MV Peak A Tristan 1.69 m/s    LV  Systolic Volume 19.69 mL    LV Systolic Volume Index 10.1 mL/m2    LV Diastolic Volume 90.13 mL    LV Diastolic Volume Index 46.22 mL/m2    LA Volume Index 35.9 mL/m2    LV Mass Index 102.3 g/m2    Right Atrium Major Axis 3.93 cm    Left Atrium Minor Axis 4.83 cm    Left Atrium Major Axis 4.83 cm    Triscuspid Valve Regurgitation Peak Gradient 55.35 mmHg    Right Atrial Pressure (from IVC) 3 mmHg    MV mean gradient 8 mmHg    MV valve area p 1/2 method 2.97 cm2    TV rest pulmonary artery pressure 58.35 mmHg    MV stenosis pressure 1/2 time 74 ms     · Concentric left ventricular hypertrophy.  · Normal left ventricular systolic function. The estimated ejection fraction is 60%  · Indeterminate left ventricular diastolic function.  · Normal right ventricular systolic function.  · There is a porcine bioprosthetic mitral valve. The prosthetic valve is normal.  · The estimated PA systolic pressure is 58.35 mm Hg  · Moderate aortic valve stenosis.  · Aortic valve area is 1.13 cm2; peak velocity is 2.83 m/s; mean gradient is 27 mmHg.

## 2018-11-23 NOTE — H&P
[facility]  Cardiology  History and Physical     Patient Name: Tara Mondragon  MRN: [id number]  Admission Date: 11/23/2018  Code Status: Prior   Attending Provider: Nithin Fall, *   Primary Care Physician: Jaun Mata MD  Principal Problem:Acute on chronic diastolic heart failure    Patient information was obtained from patient and past medical records.     Subjective:     Chief Complaint:  SOB; ADHF      HPI:  73yo female with history of CAD s/p 2V CABG 2/2013 (LIMA-LAD; SVG-PDA) & MR s/p MVR 2/2013 (27mm [employer] porcine valve), diastolic dysfunction, PAD (occluded LSFA and left PFA on C 2/2013),  HTN, HLP and hypothyroidism who presents to the ER with complaints of SOB. Ms. Mondragon was admitted last week for similar symptoms and underwent stress test (no evidence of ischemia) and LINDA (moderate MR and mild AS). Her symptoms were felt to be related to dietary indiscretion of salt intake. She was discharged home on Lasix 40mg po daily. She was feeling well the day of discharge and was able to ambulate around the unit with resolution of SOB. She was feeling well upon discharge and was able to do full activities. However her SOB returned on Wednesday with progressive worsening. She called Dr. Duffy't office on Wednesday and was instructed to increase her Lasix to BID. She reports the SOB did not improve and worsened prompting her to present to the ER this morning. She reports eating pizza on Tuesday and went out to eat yesterday for Thanksgiving despite her instructions for low salt diet upon discharge. She denies any chest pain, palpitations or dizziness.     Hospital Course:    11/23/2018 Presented to the ER with complaints of SOB with progressive worsening.  slightly higher than previous reading at 141. Troponin .010. CXR  suggestive of pulmonary edema/CHF with possible small right pleural effusion worse than previous CXR. BMP and CBC WNL. Given Lasix 80mg IV in the ER with  250cc out so far. Will admit to observation under care of [facility] for ADHF related to dietary indiscretion of salt intake    Past Medical History:   Diagnosis Date    Acute coronary syndrome     Cardiomyopathy     Coronary artery disease     Hypertension     Hyperthyroidism     Osteoporosis     PAD (peripheral artery disease)     Valvular regurgitation     Mild/Mod MR post MI       Past Surgical History:   Procedure Laterality Date    CARDIAC VALVE SURGERY      porcine MVR    CHOLECYSTECTOMY      CORONARY ANGIOPLASTY      CORONARY ARTERY BYPASS GRAFT      CORONARY ARTERY BYPASS GRAFT (CABG) N/A 2/26/2013    [facility]formed [facility] at [facility] 2ND FLR    REPAIR VALVE-MITRAL N/A 2/26/2013    [facility]formed by [facility] at [facility] OR 2ND FLR    TONSILLECTOMY         Review of patient's allergies indicates:   Allergen Reactions    Plavix [clopidogrel] Rash       No current facility-administered medications on file prior to encounter.      Current Outpatient Medications on File Prior to Encounter   Medication Sig    acetaminophen (TYLENOL) 325 MG tablet Take 325 mg by mouth once as needed for Pain.    amLODIPine (NORVASC) 5 MG tablet TAKE 1 TABLET BY MOUTH EVERY DAY    ammonium lactate 12 % Crea Apply to feet twice daily. Avoid use between toes.    aspirin (ECOTRIN) 81 MG EC tablet Take 1 tablet by mouth Daily.    atorvastatin (LIPITOR) 40 MG tablet TAKE 1 TABLET(40 MG) BY MOUTH EVERY DAY    cilostazol (PLETAL) 50 MG Tab Take 1 tablet (50 mg total) by mouth 2 (two) times daily.    furosemide (LASIX) 40 MG tablet Take 1 tablet (40 mg total) by mouth once daily. (Patient taking differently: Take 40 mg by mouth 2 (two) times daily. )    guaifenesin (MUCINEX) 600 mg 12 hr tablet Take 1,200 mg by mouth once as needed for Congestion.    metoprolol tartrate (LOPRESSOR) 50 MG tablet TAKE 1 TABLET BY MOUTH TWICE DAILY    nitroGLYCERIN (NITROSTAT) 0.4 MG SL tablet Place 1 tablet  (0.4 mg total) under the tongue every 5 (five) minutes as needed for Chest pain.    potassium chloride (KLOR-CON) 8 MEQ TbSR TAKE 1 TABLET BY MOUTH DAILY    potassium chloride (KLOR-CON) 8 MEQ TbSR TAKE 1 TABLET BY MOUTH EVERY DAY    spironolactone (ALDACTONE) 25 MG tablet Take 1 tablet (25 mg total) by mouth once daily.     Family History     Problem Relation (Age of Onset)    Asthma Daughter    Heart failure Mother    Hypertension Sister, Brother    Hypothyroidism Mother, Sister    Liver disease Father        Tobacco Use    Smoking status: Former Smoker     Packs/day: 1.00     Years: 50.00     Pack years: 50.00     Last attempt to quit: 2/3/2013     Years since quittin.8    Smokeless tobacco: Never Used   Substance and Sexual Activity    Alcohol use: No    Drug use: No    Sexual activity: Not on file     Review of Systems   Constitution: Negative for chills, decreased appetite, diaphoresis, fever and weakness.   Cardiovascular: Positive for dyspnea on exertion. Negative for chest pain, claudication, cyanosis, irregular heartbeat, leg swelling, near-syncope, orthopnea, palpitations, paroxysmal nocturnal dyspnea and syncope.   Respiratory: Negative for cough, hemoptysis, shortness of breath and wheezing.    Gastrointestinal: Negative for bloating, abdominal pain, constipation, diarrhea, melena, nausea and vomiting.   Neurological: Negative for dizziness.     Objective:     Vital Signs (Most Recent):  Temp: 98.8 °F (37.1 °C) (18 0841)  Pulse: 84 (18 0844)  Resp: (!) 22 (18)  BP: (!) 104/56 (18 0844)  SpO2: 95 % (18 0844) Vital Signs (24h Range):  Temp:  [98.1 °F (36.7 °C)-98.8 °F (37.1 °C)] 98.8 °F (37.1 °C)  Pulse:  [83-96] 84  Resp:  [20-22] 22  SpO2:  [89 %-95 %] 95 %  BP: (101-118)/(56-63) 104/56     Weight: 87.1 kg (192 lb)  Body mass index is 30.99 kg/m².    SpO2: 95 %  O2 Device (Oxygen Therapy): nasal cannula      Intake/Output Summary (Last 24 hours) at  11/23/2018 0948  Last data filed at 11/23/2018 0925  Gross per 24 hour   Intake --   Output 250 ml   Net -250 ml       Lines/Drains/Airways     Peripheral Intravenous Line                 Peripheral IV - Single Lumen 11/23/18 0632 Right Antecubital less than 1 day                Physical Exam   Constitutional: She is oriented to person, place, and time. She appears well-developed and well-nourished. No distress.   Cardiovascular: Normal rate and regular rhythm. Exam reveals no gallop.   No murmur heard.  Pulmonary/Chest: Effort normal. Tachypnea noted. No respiratory distress. She has decreased breath sounds. She has no wheezes.   Abdominal: Soft. Bowel sounds are normal. She exhibits no distension. There is no tenderness.   Neurological: She is alert and oriented to person, place, and time.   Skin: Skin is warm and dry.       Significant Labs:     Recent Labs   Lab 11/23/18  0640   *   K 3.5   CL 95   CO2 26   BUN 21   CREATININE 1.0     Recent Labs   Lab 11/23/18  0640   WBC 13.44*   RBC 4.40   HGB 13.4   HCT 38.7      MCV 88   MCH 30.5   MCHC 34.6     Recent Labs   Lab 11/23/18  0640   CPK 55   TROPONINI 0.010       Significant Imaging: Echocardiogram:   Transthoracic echo (TTE) complete (Cupid Only):   Results for orders placed or performed during the hospital encounter of 11/17/18   Transthoracic echo (TTE) complete (Cupid Only)   Result Value Ref Range    BSA 1.95 m2    LA WIDTH 3.80 cm    LVIDD 4.45 3.5 - 6.0 cm    IVS 1.19 (A) 0.6 - 1.1 cm    PW 1.25 (A) 0.6 - 1.1 cm    Ao root annulus 2.67 cm    LVIDS 2.38 2.1 - 4.0 cm    FS 47 28 - 44 %    LA volume 70.05 cm3    LV mass 199.42 g    LA size 4.49 cm    RVDD 2.59 cm    Left Ventricle Relative Wall Thickness 0.56 cm    AV mean gradient 27 mmHg    AV valve area 1.13 cm2    E/A ratio 1.28     E wave decelartion time 220.46 msec    LVOT diameter 1.90 cm    LVOT area 2.83 cm2    LVOT peak VTI 29.83 cm    Ao peak laura 2.83 m/s    Ao VTI 75.00 cm    LVOT  stroke volume 84.53 cm3    AV peak gradient 32.04 mmHg    MV Peak E Tristan 2.16 m/s    TR Max Tristan 3.72 m/s    MV Peak A Tristan 1.69 m/s    LV Systolic Volume 19.69 mL    LV Systolic Volume Index 10.1 mL/m2    LV Diastolic Volume 90.13 mL    LV Diastolic Volume Index 46.22 mL/m2    LA Volume Index 35.9 mL/m2    LV Mass Index 102.3 g/m2    Right Atrium Major Axis 3.93 cm    Left Atrium Minor Axis 4.83 cm    Left Atrium Major Axis 4.83 cm    Triscuspid Valve Regurgitation Peak Gradient 55.35 mmHg    Right Atrial Pressure (from IVC) 3 mmHg    MV mean gradient 8 mmHg    MV valve area p 1/2 method 2.97 cm2    TV rest pulmonary artery pressure 58.35 mmHg    MV stenosis pressure 1/2 time 74 ms     · Concentric left ventricular hypertrophy.  · Normal left ventricular systolic function. The estimated ejection fraction is 60%  · Indeterminate left ventricular diastolic function.  · Normal right ventricular systolic function.  · There is a porcine bioprosthetic mitral valve. The prosthetic valve is normal.  · The estimated PA systolic pressure is 58.35 mm Hg  · Moderate aortic valve stenosis.  · Aortic valve area is 1.13 cm2; peak velocity is 2.83 m/s; mean gradient is 27 mmHg.    Assessment and Plan:     * Acute on chronic diastolic heart failure    -recent TTE in 11/2018 with normal LVEF and indeterminate diastolic dysfunction; previous echo in 2013 with diastolic dysfunction  -related to dietary indiscretion of salt intake; reinforced importance of strict adherence to low salt diet  -volume overload on exam and pulmonary edema on CXR; given IV Lasix in the ER; will place on Lasix 80mg IV BID and admit to observation for ADHF  -continue Norvasc, Metoprolol and Aldactone; will consult CHF education nurse for dietary education/reinforcement       Shortness of breath    -related to ADHF  -management as detailed previously      Hypertension    -stable upon presentation   -continue CCB, BB and Aldactone      S/P MVR (mitral valve  replacement)    -porcine MVR 2/2013  -recent TTE with well seated valve; LINDA earlier this week for further evaluation of mitral valve with moderate MR          VTE Risk Mitigation (From admission, onward)    None          RHONA De Leon, ANP  Cardiology   [facility]

## 2018-11-23 NOTE — ED TRIAGE NOTES
Patient complaints of shortness of breath on exertion.  Was released from Hospital on Monday for same.  Has increased the Lasix as instructed by doctor, but no change.

## 2018-11-23 NOTE — NURSING
[facility] RN clinician visited patient at bedside to provide heart failure education.  Patient and daughter  were present upon my hospital visit.  Signs and symptoms of heart failure discussed in detail.  Discussed importance of taking daily weights and what to do if there is a 2-3 pound weight gain in a day or 5 pound or more weight gain in one week.  Patient states she has a scale at home and weighs herself daily, but does not track it and sometimes forgets how much she weighed the day before.  Patient agrees she will start tracking her weights as advised.  Patient educated on low sodium diet and fluid limitation.  Reviewed with patient and daughter some high sodium foods to avoid.  Instructions given on performing activities as tolerated, otherwise, instructed by physician. Educated patient on some common signs and symptoms of heart failure and the importance of reporting these symptoms to her healthcare provider early.  Discussed with patient the importance of medication compliance related to heart failure diagnosis.   8 Step Plan for Heart Failure Patients booklet provided to patient.  Patient and daughter  given opportunity to ask questions, stated understanding of education provided.

## 2018-11-23 NOTE — ED PROVIDER NOTES
Encounter Date: 11/23/2018    SCRIBE #1 NOTE: I, Mandy Mariano, am scribing for, and in the presence of,  Dr. Fall. I have scribed the entire note.       History     Chief Complaint   Patient presents with    Shortness of Breath     Patient was released last  monday from hospital and has not improved.  Patient's sob increases with excertion.     Tara Mondragon is a 72 y.o. female who  has a past medical history of Acute coronary syndrome, Cardiomyopathy, Coronary artery disease, Hypertension, Hyperthyroidism, Osteoporosis, PAD (peripheral artery disease), and Valvular regurgitation.    The patient presents to the ED due to shortness of breath. The patient's relative states the patient was admitted to a hospital over the weekend and was discharged Monday. The patient's relatives feel the patient's symptoms hasn't improved. They state the patient can not walk without becoming short of breath. The patient states her shortness of breath worsens when laying flat. She reports a dry cough, but denies chest pain, fever, N/V/D, or abdominal pain. The patient reports no other symptoms.      The history is provided by the patient and a relative.     Review of patient's allergies indicates:   Allergen Reactions    Plavix [clopidogrel] Rash     Past Medical History:   Diagnosis Date    Acute coronary syndrome     Cardiomyopathy     Coronary artery disease     Hypertension     Hyperthyroidism     Osteoporosis     PAD (peripheral artery disease)     Valvular regurgitation     Mild/Mod MR post MI     Past Surgical History:   Procedure Laterality Date    CARDIAC VALVE SURGERY      porcine MVR    CHOLECYSTECTOMY      CORONARY ANGIOPLASTY      CORONARY ARTERY BYPASS GRAFT      CORONARY ARTERY BYPASS GRAFT (CABG) N/A 2/26/2013    [facility] 2ND FLR    REPAIR VALVE-MITRAL N/A 2/26/2013    Performed by Dam[facility] at [facility] OR 2ND FLR    TONSILLECTOMY       Family History    Problem Relation Age of Onset    Heart failure Mother     Hypothyroidism Mother     Liver disease Father     Hypertension Sister     Hypothyroidism Sister     Hypertension Brother     Asthma Daughter      Social History     Tobacco Use    Smoking status: Former Smoker     Packs/day: 1.00     Years: 50.00     Pack years: 50.00     Last attempt to quit: 2/3/2013     Years since quittin.8    Smokeless tobacco: Never Used   Substance Use Topics    Alcohol use: No    Drug use: No     Review of Systems   Constitutional: Negative for chills and fever.   HENT: Negative for congestion, rhinorrhea and sore throat.    Eyes: Negative for redness and visual disturbance.   Respiratory: Positive for cough and shortness of breath. Negative for wheezing.    Cardiovascular: Negative for chest pain and palpitations.   Gastrointestinal: Negative for abdominal pain, diarrhea, nausea and vomiting.   Genitourinary: Negative for dysuria and hematuria.   Musculoskeletal: Negative for back pain, myalgias and neck pain.   Skin: Negative for rash.   Neurological: Negative for dizziness, weakness and light-headedness.   Psychiatric/Behavioral: Negative for confusion.       Physical Exam     Initial Vitals   BP Pulse Resp Temp SpO2   18 0621 18 0621 18 0621 18 0622 18 0621   (!) 113/55 96 (!) (P) 22 97.8 °F (36.6 °C) (!) 89 %      MAP       --                Physical Exam    Nursing note and vitals reviewed.  Constitutional: She appears well-developed and well-nourished.   HENT:   Head: Normocephalic and atraumatic.   Eyes: Conjunctivae and EOM are normal. Pupils are equal, round, and reactive to light.   Cardiovascular: Normal rate and regular rhythm.   Murmur heard.  Systolic ejection murmur.   Pulmonary/Chest:   Diminished breath sounds at bases with course crackles.   Abdominal: Soft. There is no tenderness.   Musculoskeletal:   No LE edema.   Neurological: She is alert and oriented to person,  place, and time. She has normal strength and normal reflexes.   Skin: Skin is warm and dry.         ED Course   Procedures  Labs Reviewed   CBC W/ AUTO DIFFERENTIAL - Abnormal; Notable for the following components:       Result Value    WBC 13.44 (*)     Gran # (ANC) 11.1 (*)     Mono # 0.1 (*)     Gran% 83.0 (*)     Lymph% 15.2 (*)     Mono% 1.0 (*)     All other components within normal limits   COMPREHENSIVE METABOLIC PANEL - Abnormal; Notable for the following components:    Sodium 132 (*)     Glucose 126 (*)     Total Bilirubin 1.3 (*)     eGFR if non  56 (*)     All other components within normal limits   B-TYPE NATRIURETIC PEPTIDE - Abnormal; Notable for the following components:     (*)     All other components within normal limits   D DIMER, QUANTITATIVE - Abnormal; Notable for the following components:    D-Dimer 1.50 (*)     All other components within normal limits   CK   TROPONIN I   URINALYSIS     EKG Readings: (Independently Interpreted)   Initial Reading: No STEMI. Rhythm: Normal Sinus Rhythm. Heart Rate: 88. Ectopy: No Ectopy. Conduction: Normal. ST Segments: Normal ST Segments. T Waves: Normal. Clinical Impression: Normal Sinus Rhythm       Imaging Results          X-Ray Chest 1 View (Final result)  Result time 11/23/18 06:55:45    Final result by Rossana Laguerre MD (11/23/18 06:55:45)                 Impression:      Findings suggestive of pulmonary edema/CHF with possible small right pleural effusion.      Electronically signed by: Rossana Laguerre MD  Date:    11/23/2018  Time:    06:55             Narrative:    EXAMINATION:  XR CHEST 1 VIEW    CLINICAL HISTORY:  shortness of breath;    TECHNIQUE:  Single frontal view of the chest was performed.    COMPARISON:  11/18/2018    FINDINGS:  Monitoring leads overlie the chest.  There is postoperative change of prior median sternotomy.  The cardiomediastinal silhouette is stable.  The lungs are symmetrically expanded with increased  interstitial and parenchymal attenuation which can be seen in the setting of pulmonary edema/CHF although correlation for undergoing infectious process is advised.  There is increased attenuation in the right lower lung zone possibly reflecting component of pleural fluid with adjacent atelectatic change.  No pneumothorax.  Visualized osseous structures are intact.                                 Medical Decision Making:   Initial Assessment:   Tara Mondragon is a 72 y.o. female who presents to the ED due to shortness of breath.  Differential Diagnosis:   PE, MI/ACS, pneumothorax, pericardial effusion/tamonade, pneumonia, lung abscess, pericarditis/myocarditis, pleural effusion, lung mass, CHF exacerbation, asthma exacerbation, COPD exacerbation, aspirated/ingested foreign body, airway obstruction, CO poisoning, anemia, metabolic derangement, allergy/atopy, influenza, viral URI, viral syndrome.    Clinical Tests:   Lab Tests: Ordered and Reviewed  Radiological Study: Ordered and Reviewed  Medical Tests: Ordered and Reviewed  ED Management:  72-year-old female with heart failure.  Patient was given 80 mg of Lasix intravenously here in the ED.  Chest x-ray shows pulmonary edema. She will be admitted by Cardiology for observation and further diuresis.                      Clinical Impression:     1. Congestive heart failure, unspecified HF chronicity, unspecified heart failure type    2. Shortness of breath    3. SOB (shortness of breath)    4. Chest pain                  I, Dr. Nithin Montana, personally performed the services described in this documentation. All medical record entries made by the scribe were at my direction and in my presence. I have reviewed the chart and agree that the record reflects my personal performance and is accurate and complete. Nithin Montana MD.  3:50 PM 11/23/2018                   Nithin Montana MD  11/23/18 4853

## 2018-11-23 NOTE — HOSPITAL COURSE
11/23/2018 Presented to the ER with complaints of SOB with progressive worsening.  slightly higher than previous reading at 141. Troponin .010. CXR  suggestive of pulmonary edema/CHF with possible small right pleural effusion worse than previous CXR. BMP and CBC WNL. Given Lasix 80mg IV in the ER with 250cc out so far. Will admit to observation under care of [facility] for ADHF related to dietary indiscretion of salt intake  11/24/2018 Pt had subjective dyspnea with talking. Pt only diuresis about 2 L of fluid, net neg only 1 liter. Renal function is stable.  No ovenight issues.  11/25/2018 Pt had HERNANDES, with hypoxia with ambulating down the fonseca, O2 sat > 86. Pt has wheezes on exam. UOP ~ 2L, net neg 1.5.  Chest pain free  11/26/2018 Remains on IV Lasix BID with 1.7 liters out overnight and negative 3.4 liters since admission. SOB with ambulating to bathroom this AM. Will repeat ambulatory pulse ox. Proceed with PFTs today as well as CXR PA& lat. HR and BP stable   11/27/2018 Remains on IV Lasix BID with dose of IVPB Diuril given yesterday. 2.0 liters out overnight and negative 4.6 liters since admission. HR and BP stable overnight. Patient reports SOB improved. Pulmonary consulted yesterday with recs reviewed. PFTs this AM. CTA chest as well today. Will continue IV Lasix BID for now.   11/28/2018 Remains on IV Lasix BID with 1.6 liters out overnight. Negative 5.9 liters since admission. PFTs with no evidence of obstruction. CTA with no evidence of PE; ground glass opacity to ULL unchanged from previous images favoring ILD. SOB better today per patient but not completely resolved. CBC and BMP stable. Discussed further evaluation for SOB with patient to include LHC and RHC. Will proceed today   11/29/2018 Underwent LHC and RHC via left brachial and left radial site with following results:      · LVEDP 18  · EF 65%  · Severe (4+) mitral regurgitation with severe LA dilation.  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  · Patent LIMA-LAD, occluded SVG-RCA  · Native RCA patent with mild to moderate disease  · Separate ostia of LAD and LCX and LCX native circulation patent with patent mid stent  · RHC pressures: RA 8/7 (4), RV 61/4, RPAP 60/18, PWP 21/48 (26), SUZIE CO 4.73, SUZIE CI 2.43      Continued on IV Lasix BID overnight with only 875cc out ?accuracy but negative 6.4 liters since admission. Reports SOB overnight but unable to elaborate. Will transition to oral Lasix BID this AM and assess response. Will ambulate in the hallway for evaluation of SOB. Will evaluate for discharge later today. Referred to [facility] valve clinic for further evaluation and treatment of MR

## 2018-11-23 NOTE — Clinical Note
The DP pulses are detected w/ doppler bilaterally. The PT pulses are detected w/ doppler bilaterally. The right brachial pulse is 2+.

## 2018-11-23 NOTE — ED NOTES
Patient identifiers verified and correct for Tara Mondragon.    LOC: The patient is awake, alert and aware of environment with an appropriate affect, the patient is oriented x 3 and speaking appropriately.  APPEARANCE: Patient resting comfortably and in no acute distress, patient is clean and well groomed, patient's clothing is properly fastened.  SKIN: The skin is warm and dry, color consistent with ethnicity, patient has normal skin turgor and moist mucus membranes, skin intact, no breakdown or bruising noted.  MUSCULOSKELETAL: Patient moving all extremities spontaneously, no obvious swelling or deformities noted.  RESPIRATORY: Airway is open and patent, respirations are spontaneous, patient has a normal effort and rate, no accessory muscle use noted, bilateral breath sounds diminished to lower lobes . Dry cough.  Shortness of breath on exertion.  CARDIAC: Patient has a normal rate and regular rhythm, no periphreal edema noted, capillary refill < 3 seconds.  ABDOMEN: Soft and non tender to palpation, no distention noted, normoactive bowel sounds present in all four quadrants.  NEUROLOGIC: PERRL, 4 mm bilaterally, eyes open spontaneously, behavior appropriate to situation, follows commands, facial expression symmetrical, bilateral hand grasp equal and even, purposeful motor response noted, normal sensation in all extremities when touched with a finger.

## 2018-11-24 LAB
ANION GAP SERPL CALC-SCNC: 11 MMOL/L
BUN SERPL-MCNC: 23 MG/DL
CALCIUM SERPL-MCNC: 9.5 MG/DL
CHLORIDE SERPL-SCNC: 91 MMOL/L
CO2 SERPL-SCNC: 27 MMOL/L
CREAT SERPL-MCNC: 1 MG/DL
EST. GFR  (AFRICAN AMERICAN): >60 ML/MIN/1.73 M^2
EST. GFR  (NON AFRICAN AMERICAN): 56 ML/MIN/1.73 M^2
GLUCOSE SERPL-MCNC: 105 MG/DL
MAGNESIUM SERPL-MCNC: 1.7 MG/DL
POTASSIUM SERPL-SCNC: 3 MMOL/L
SODIUM SERPL-SCNC: 129 MMOL/L

## 2018-11-24 PROCEDURE — 36415 COLL VENOUS BLD VENIPUNCTURE: CPT

## 2018-11-24 PROCEDURE — 99225 PR SUBSEQUENT OBSERVATION CARE,LEVEL II: CPT | Mod: ,,, | Performed by: STUDENT IN AN ORGANIZED HEALTH CARE EDUCATION/TRAINING PROGRAM

## 2018-11-24 PROCEDURE — 25000003 PHARM REV CODE 250: Performed by: NURSE PRACTITIONER

## 2018-11-24 PROCEDURE — 94761 N-INVAS EAR/PLS OXIMETRY MLT: CPT

## 2018-11-24 PROCEDURE — G8978 MOBILITY CURRENT STATUS: HCPCS | Mod: CJ

## 2018-11-24 PROCEDURE — G8979 MOBILITY GOAL STATUS: HCPCS | Mod: CH

## 2018-11-24 PROCEDURE — 27000221 HC OXYGEN, UP TO 24 HOURS

## 2018-11-24 PROCEDURE — 97161 PT EVAL LOW COMPLEX 20 MIN: CPT

## 2018-11-24 PROCEDURE — 83735 ASSAY OF MAGNESIUM: CPT

## 2018-11-24 PROCEDURE — 25000003 PHARM REV CODE 250: Performed by: STUDENT IN AN ORGANIZED HEALTH CARE EDUCATION/TRAINING PROGRAM

## 2018-11-24 PROCEDURE — 63600175 PHARM REV CODE 636 W HCPCS: Performed by: NURSE PRACTITIONER

## 2018-11-24 PROCEDURE — G0378 HOSPITAL OBSERVATION PER HR: HCPCS

## 2018-11-24 PROCEDURE — 80048 BASIC METABOLIC PNL TOTAL CA: CPT

## 2018-11-24 RX ORDER — AMOXICILLIN 250 MG
2 CAPSULE ORAL 2 TIMES DAILY PRN
Status: DISCONTINUED | OUTPATIENT
Start: 2018-11-24 | End: 2018-11-29 | Stop reason: HOSPADM

## 2018-11-24 RX ORDER — ACETAMINOPHEN 325 MG/1
650 TABLET ORAL EVERY 6 HOURS PRN
Status: DISCONTINUED | OUTPATIENT
Start: 2018-11-24 | End: 2018-11-29 | Stop reason: HOSPADM

## 2018-11-24 RX ORDER — POTASSIUM CHLORIDE 20 MEQ/1
20 TABLET, EXTENDED RELEASE ORAL 2 TIMES DAILY
Status: DISCONTINUED | OUTPATIENT
Start: 2018-11-24 | End: 2018-11-25

## 2018-11-24 RX ORDER — SIMETHICONE 80 MG
80 TABLET,CHEWABLE ORAL EVERY 6 HOURS PRN
Status: DISCONTINUED | OUTPATIENT
Start: 2018-11-24 | End: 2018-11-29 | Stop reason: HOSPADM

## 2018-11-24 RX ADMIN — ENOXAPARIN SODIUM 40 MG: 100 INJECTION SUBCUTANEOUS at 04:11

## 2018-11-24 RX ADMIN — FUROSEMIDE 80 MG: 10 INJECTION, SOLUTION INTRAMUSCULAR; INTRAVENOUS at 05:11

## 2018-11-24 RX ADMIN — PANTOPRAZOLE SODIUM 40 MG: 40 TABLET, DELAYED RELEASE ORAL at 09:11

## 2018-11-24 RX ADMIN — STANDARDIZED SENNA CONCENTRATE AND DOCUSATE SODIUM 2 TABLET: 8.6; 5 TABLET, FILM COATED ORAL at 04:11

## 2018-11-24 RX ADMIN — FUROSEMIDE 80 MG: 10 INJECTION, SOLUTION INTRAMUSCULAR; INTRAVENOUS at 09:11

## 2018-11-24 RX ADMIN — ATORVASTATIN CALCIUM 40 MG: 40 TABLET, FILM COATED ORAL at 09:11

## 2018-11-24 RX ADMIN — ASPIRIN 81 MG: 81 TABLET, COATED ORAL at 09:11

## 2018-11-24 RX ADMIN — SIMETHICONE CHEW TAB 80 MG 80 MG: 80 TABLET ORAL at 08:11

## 2018-11-24 RX ADMIN — POTASSIUM CHLORIDE 20 MEQ: 1500 TABLET, EXTENDED RELEASE ORAL at 08:11

## 2018-11-24 RX ADMIN — SPIRONOLACTONE 25 MG: 25 TABLET ORAL at 09:11

## 2018-11-24 RX ADMIN — AMLODIPINE BESYLATE 5 MG: 5 TABLET ORAL at 09:11

## 2018-11-24 RX ADMIN — METOPROLOL TARTRATE 50 MG: 50 TABLET ORAL at 08:11

## 2018-11-24 RX ADMIN — METOPROLOL TARTRATE 50 MG: 50 TABLET ORAL at 09:11

## 2018-11-24 RX ADMIN — POTASSIUM CHLORIDE 20 MEQ: 1500 TABLET, EXTENDED RELEASE ORAL at 01:11

## 2018-11-24 NOTE — PLAN OF CARE
Problem: Fall Risk (Adult)  Goal: Absence of Falls  Patient will demonstrate the desired outcomes by discharge/transition of care.  Safety maintained. Bed alarm on and bed in lowest position. Call bell in reach.

## 2018-11-24 NOTE — NURSING
The pt ambulated down the fonseca beginning on RA with SpO2 at 91%. While ambulating her SpO2 dropped to 86%. The patient was placed back on O2.and her SpO2 increased to 91%.

## 2018-11-24 NOTE — PLAN OF CARE
Problem: Patient Care Overview  Goal: Plan of Care Review  Pt on documented O2, no respiratory distress noted. Will continue to monitor.

## 2018-11-24 NOTE — PLAN OF CARE
11/24/18 1257   Discharge Assessment   Assessment Type Discharge Planning Assessment   Confirmed/corrected address and phone number on facesheet? Yes   Assessment information obtained from? Patient   Prior to hospitilization cognitive status: Alert/Oriented   Prior to hospitalization functional status: Independent   Current cognitive status: Alert/Oriented   Current Functional Status: Independent   Facility Arrived From: home   Lives With spouse   Able to Return to Prior Arrangements yes   Is patient able to care for self after discharge? Yes   Who are your caregiver(s) and their phone number(s)? Kirby Mondragon()[phone]   Readmission Within The Last 30 Days no previous admission in last 30 days   Patient currently being followed by outpatient case management? No   Patient currently receives any other outside agency services? No   Equipment Currently Used at Home none   Do you have any problems affording any of your prescribed medications? No   Is the patient taking medications as prescribed? yes   Does the patient have transportation home? Yes   Transportation Available family or friend will provide   Does the patient receive services at the [facility]? No   Discharge Plan A Home with family   Discharge Plan B Home Health   Patient/Family In Agreement With Plan yes        11/24/18 1257   Discharge Assessment   Assessment Type Discharge Planning Assessment   Confirmed/corrected address and phone number on facesheet? Yes   Assessment information obtained from? Patient   Prior to hospitilization cognitive status: Alert/Oriented   Prior to hospitalization functional status: Independent   Current cognitive status: Alert/Oriented   Current Functional Status: Independent   Facility Arrived From: home   Lives With spouse   Able to Return to Prior Arrangements yes   Is patient able to care for self after discharge? Yes   Who are your caregiver(s) and their phone number(s)? Kirby [phone]    Readmission Within The Last 30 Days no previous admission in last 30 days   Patient currently being followed by outpatient case management? No   Patient currently receives any other outside agency services? No   Equipment Currently Used at Home none   Do you have any problems affording any of your prescribed medications? No   Is the patient taking medications as prescribed? yes   Does the patient have transportation home? Yes   Transportation Available family or friend will provide   Does the patient receive services at the [facility]? No   Discharge Plan A Home with family   Discharge Plan B Home Health   Patient/Family In Agreement With Plan yes        The Sw met with the pt to complete the assessment. The pt's independent with her adl's and uses no dme. The pt's employed as a . The pt has transportation home at d/c. The pt states she will be using [employer] for insurance starting 1-1-19. The pt still drives. The Sw left her contact info on the white board in the pt's room.

## 2018-11-24 NOTE — PLAN OF CARE
Problem: Physical Therapy Goal  Goal: Physical Therapy Goal  Goals to be met by: 2018     Patient will increase functional independence with mobility by performin. Gait  x >200 feet with Modified [place] using no AD.     Outcome: Ongoing (interventions implemented as appropriate)  Recommend Home vs outpatient cardiopulmonary rehab  No DME needs apparent  Seated EOB on RA ~ 91 % , with gait drop in O2 sat 86% on RA, gait with O2 at 2l  O2 sat 91% and .  Patient without c/o dizziness.weakness or light headedness, however with c/o SOB.

## 2018-11-24 NOTE — PT/OT/SLP EVAL
Physical Therapy Evaluation    Patient Name:  Tara Mondragon   MRN:  [id number]    Recommendations:     Discharge Recommendations:  home(out patient cardiopulmonary rehab)   Discharge Equipment Recommendations: none   Barriers to discharge: None    Assessment:     Tara Mondragon is a 72 y.o. female admitted with a medical diagnosis of Acute on chronic diastolic heart failure.  She presents with the following impairments/functional limitations:  impaired endurance, impaired cardiopulmonary response to activity, impaired functional mobilty, impaired balance . Patient without gross motor disturbance, however limited by cardiopulmonary dysfunction with SOB upon exertion. See POC note for O2 sats. .    Rehab Prognosis:  good; patient would benefit from acute skilled PT services to address these deficits and reach maximum level of function.      Recent Surgery: * No surgery found *      Plan:     During this hospitalization, patient to be seen 6 x/week to address the above listed problems via gait training  · Plan of Care Expires:  12/24/18   Plan of Care Reviewed with: patient    Subjective     Communicated with primary nurse prior to session.  Patient found seated EOB upon PT entry to room, agreeable to evaluation.      Chief Complaint: SOB with exertion  Patient comments/goals: go home  Pain/Comfort:  · Pain Rating 1: 0/10  · Pain Rating Post-Intervention 1: 0/10    Patients cultural, spiritual, [employer]ism conflicts given the current situation:      Living Environment:  Lives with spouse in [facility] no concerns  Prior to admission, patients level of function was independent.  Patient has the following equipment: none.  DME owned (not currently used): none.  Upon discharge, patient will have assistance from family.    Objective:     Patient found with: telemetry, oxygen     General Precautions: Standard,     Orthopedic Precautions:N/A   Braces: N/A     Exams:  · RLE ROM: WFL  · RLE Strength: WFL  · LLE ROM: WFL  · LLE  Strength: WFL    Functional Mobility:  · Transfers:     · Sit to Stand:  modified independence with no AD  · Gait: Mod I no LOB limited by SOB  · Balance: good / good -    AM-PAC 6 CLICK MOBILITY  Total Score:22       Therapeutic Activities and Exercises:   na    Patient left seated EOB with all lines intact, call button in reach and primary nurse present.    GOALS:   Multidisciplinary Problems     Physical Therapy Goals        Problem: Physical Therapy Goal    Goal Priority Disciplines Outcome Goal Variances Interventions   Physical Therapy Goal     PT, PT/OT Ongoing (interventions implemented as appropriate)     Description:  Goals to be met by: 2018     Patient will increase functional independence with mobility by performin. Gait  x >200 feet with Modified [place] using no AD.                       History:     Past Medical History:   Diagnosis Date    Acute coronary syndrome     Cardiomyopathy     Coronary artery disease     Hypertension     Hyperthyroidism     Osteoporosis     PAD (peripheral artery disease)     Valvular regurgitation     Mild/Mod MR post MI       Past Surgical History:   Procedure Laterality Date    CARDIAC VALVE SURGERY      porcine MVR    CHOLECYSTECTOMY      CORONARY ANGIOPLASTY      CORONARY ARTERY BYPASS GRAFT      CORONARY ARTERY BYPASS GRAFT (CABG) N/A 2013    [facility] 2ND FLR    REPAIR VALVE-MITRAL N/A 2013    [facility] at [facility] 2ND FLR    TONSILLECTOMY         Clinical Decision Making:     History  Co-morbidities and personal factors that may impact the plan of care Examination  Body Structures and Functions, activity limitations and participation restrictions that may impact the plan of care Clinical Presentation   Decision Making/ Complexity Score   Co-morbidities:   [] Time since onset of injury / illness / exacerbation  [] Status of current condition  []Patient's cognitive  status and safety concerns    [x] Multiple Medical Problems (see med hx)  Personal Factors:   [] Patient's age  [] Prior Level of function   [] Patient's home situation (environment and family support)  [] Patient's level of motivation  [] Expected progression of patient      HISTORY:(criteria)    [] 71730 - no personal factors/history    [x] 64621 - has 1-2 personal factor/comorbidity     [] 13568 - has >3 personal factor/comorbidity     Body Regions:  [] Objective examination findings  [] Head     []  Neck  [] Trunk   [] Upper Extremity  [] Lower Extremity    Body Systems:  [] For communication ability, affect, cognition, language, and learning style: the assessment of the ability to make needs known, consciousness, orientation (person, place, and time), expected emotional /behavioral responses, and learning preferences (eg, learning barriers, education  needs)  [x] For the neuromuscular system: a general assessment of gross coordinated movement (eg, balance, gait, locomotion, transfers, and transitions) and motor function  (motor control and motor learning)  [] For the musculoskeletal system: the assessment of gross symmetry, gross range of motion, gross strength, height, and weight  [] For the integumentary system: the assessment of pliability(texture), presence of scar formation, skin color, and skin integrity  [x] For cardiovascular/pulmonary system: the assessment of heart rate, respiratory rate, blood pressure, and edema     Activity limitations:    [] Patient's cognitive status and saf ety concerns          [] Status of current condition      [] Weight bearing restriction  [x] Cardiopulmunary Restriction    Participation Restrictions:   [] Goals and goal agreement with the patient     [] Rehab potential (prognosis) and probable outcome      Examination of Body System: (criteria)    [x] 61733 - addressing 1-2 elements    [] 56543 - addressing a total of 3 or more elements     [] 87220 -  Addressing a total  of 4 or more elements         Clinical Presentation: (criteria)  Stable - 87666     On examination of body system using standardized tests and measures patient presents with 1-2 elements from any of the following: body structures and functions, activity limitations, and/or participation restrictions.  Leading to a clinical presentation that is considered stable and/or uncomplicated                              Clinical Decision Making  (Eval Complexity):  Low- 62675     Time Tracking:     PT Received On: 11/24/18  PT Start Time: 1350     PT Stop Time: 1405  PT Total Time (min): 15 min     Billable Minutes: Evaluation 15      Jose A Vences, PT  11/24/2018

## 2018-11-24 NOTE — PROGRESS NOTES
[facility]  Cardiology  Progress Note    Patient Name: Tara Mondragon  MRN: [id number]  Admission Date: 11/23/2018  Hospital Length of Stay: 0 days  Code Status: Full Code   Attending Physician: William Quiroz MD   Primary Care Physician: Jaun Mata MD  Expected Discharge Date:   Principal Problem:Acute on chronic diastolic heart failure    Subjective:     11/23/2018 Presented to the ER with complaints of SOB with progressive worsening.  slightly higher than previous reading at 141. Troponin .010. CXR  suggestive of pulmonary edema/CHF with possible small right pleural effusion worse than previous CXR. BMP and CBC WNL. Given Lasix 80mg IV in the ER with 250cc out so far. Will admit to observation under care of [facility] for ADHF related to dietary indiscretion of salt intake    11/24: Pt had subjective dyspnea with talking. Pt only diuresis about 2 L of fluid, net neg only 1 liter. Renal function is stable.  No ovenight issues.    Review of Systems   Constitution: Negative for chills, decreased appetite, weakness, malaise/fatigue and weight gain.   HENT: Negative for congestion and ear discharge.    Eyes: Negative for blurred vision and double vision.   Cardiovascular: Positive for dyspnea on exertion. Negative for chest pain, cyanosis, irregular heartbeat, leg swelling, near-syncope, orthopnea, palpitations and syncope.   Respiratory: Positive for shortness of breath. Negative for cough and sleep disturbances due to breathing.    Skin: Negative for color change and dry skin.   Musculoskeletal: Negative for joint pain, joint swelling and muscle cramps.   Gastrointestinal: Positive for bloating and change in bowel habit (constipation). Negative for heartburn, hematemesis and hematochezia.   Genitourinary: Negative for bladder incontinence and dysuria.   Neurological: Negative for aphonia, excessive daytime sleepiness, dizziness, focal weakness, headaches, light-headedness and loss of  balance.   Psychiatric/Behavioral: Negative for altered mental status, depression and memory loss. The patient does not have insomnia and is not nervous/anxious.      Objective:     Vital Signs (Most Recent):  Temp: 98.7 °F (37.1 °C) (11/24/18 1209)  Pulse: 95 (11/24/18 1209)  Resp: 16 (11/24/18 1209)  BP: 116/66 (11/24/18 1209)  SpO2: (!) 94 % (11/24/18 1209) Vital Signs (24h Range):  Temp:  [97.8 °F (36.6 °C)-99.6 °F (37.6 °C)] 98.7 °F (37.1 °C)  Pulse:  [] 95  Resp:  [16-21] 16  SpO2:  [94 %-97 %] 94 %  BP: (111-134)/(56-75) 116/66     Weight: 87.8 kg (193 lb 9 oz)  Body mass index is 31.24 kg/m².    SpO2: (!) 94 %  O2 Device (Oxygen Therapy): nasal cannula      Intake/Output Summary (Last 24 hours) at 11/24/2018 1535  Last data filed at 11/24/2018 1230  Gross per 24 hour   Intake 1260 ml   Output 1830 ml   Net -570 ml       Lines/Drains/Airways     Peripheral Intravenous Line                 Peripheral IV - Single Lumen 11/23/18 0632 Right Antecubital 1 day                Physical Exam   Constitutional: She is oriented to person, place, and time. She appears well-developed and well-nourished.   HENT:   Head: Normocephalic and atraumatic.   Eyes: Conjunctivae and EOM are normal.   Neck: Normal range of motion. Neck supple. No JVD present.   Cardiovascular: Normal rate and regular rhythm.   Murmur heard.  Pulmonary/Chest: Effort normal and breath sounds normal. No respiratory distress. She has no wheezes. She has no rales.   Abdominal: Soft. Bowel sounds are normal. She exhibits no distension.   Musculoskeletal: Normal range of motion. She exhibits no edema.   Neurological: She is alert and oriented to person, place, and time.   Skin: Skin is warm and dry. No erythema.   Psychiatric: She has a normal mood and affect. Her behavior is normal. Judgment and thought content normal.   Nursing note and vitals reviewed.      Significant Labs:   BMP:   Recent Labs   Lab 11/23/18  0640 11/24/18  0507   * 105    * 129*   K 3.5 3.0*   CL 95 91*   CO2 26 27   BUN 21 23   CREATININE 1.0 1.0   CALCIUM 9.0 9.5   MG  --  1.7   , CBC   Recent Labs   Lab 11/23/18  0640   WBC 13.44*   HGB 13.4   HCT 38.7      , Lipid Panel No results for input(s): CHOL, HDL, LDLCALC, TRIG, CHOLHDL in the last 48 hours. and Troponin   Recent Labs   Lab 11/23/18  0640   TROPONINI 0.010       Significant Imaging: Echocardiogram:   Transthoracic echo (TTE) complete (Cupid Only):   Results for orders placed or performed during the hospital encounter of 11/17/18   Transthoracic echo (TTE) complete (Cupid Only)   Result Value Ref Range    BSA 1.95 m2    LA WIDTH 3.80 cm    LVIDD 4.45 3.5 - 6.0 cm    IVS 1.19 (A) 0.6 - 1.1 cm    PW 1.25 (A) 0.6 - 1.1 cm    Ao root annulus 2.67 cm    LVIDS 2.38 2.1 - 4.0 cm    FS 47 28 - 44 %    LA volume 70.05 cm3    LV mass 199.42 g    LA size 4.49 cm    RVDD 2.59 cm    Left Ventricle Relative Wall Thickness 0.56 cm    AV mean gradient 27 mmHg    AV valve area 1.13 cm2    E/A ratio 1.28     E wave decelartion time 220.46 msec    LVOT diameter 1.90 cm    LVOT area 2.83 cm2    LVOT peak VTI 29.83 cm    Ao peak tristan 2.83 m/s    Ao VTI 75.00 cm    LVOT stroke volume 84.53 cm3    AV peak gradient 32.04 mmHg    MV Peak E Tristan 2.16 m/s    TR Max Tristan 3.72 m/s    MV Peak A Tristan 1.69 m/s    LV Systolic Volume 19.69 mL    LV Systolic Volume Index 10.1 mL/m2    LV Diastolic Volume 90.13 mL    LV Diastolic Volume Index 46.22 mL/m2    LA Volume Index 35.9 mL/m2    LV Mass Index 102.3 g/m2    Right Atrium Major Axis 3.93 cm    Left Atrium Minor Axis 4.83 cm    Left Atrium Major Axis 4.83 cm    Triscuspid Valve Regurgitation Peak Gradient 55.35 mmHg    Right Atrial Pressure (from IVC) 3 mmHg    MV mean gradient 8 mmHg    MV valve area p 1/2 method 2.97 cm2    TV rest pulmonary artery pressure 58.35 mmHg    MV stenosis pressure 1/2 time 74 ms     Assessment and Plan:         Active Diagnoses:    Diagnosis Date Noted POA     PRINCIPAL PROBLEM:  Acute on chronic diastolic heart failure [I50.33] 11/23/2018 Yes    Shortness of breath [R06.02] 11/23/2018 Yes    Hypertension [I10]  Yes    S/P MVR (mitral valve replacement) [Z95.2] 02/27/2013 Not Applicable      Problems Resolved During this Admission:       Acute on chronic diastolic heart failure     -recent TTE in 11/2018 with normal LVEF and indeterminate diastolic dysfunction; previous echo in 2013 with diastolic dysfunction  -related to dietary indiscretion of salt intake; reinforced importance of strict adherence to low salt diet  - will repeat bnp tomorrow  - maybe is this more valvular heart disease than we realize.   -continue Norvasc, Metoprolol and Aldactone; will consult CHF education nurse for dietary education/reinforcement         Shortness of breath     -related to ADHF  -management as detailed previously   - c/w PT/OT  - will consider PFTs on Monday  - pt desat to 86% with ambulationi down the fonseca.    Hypertension     -stable upon presentation   -continue CCB, BB and Aldactone       S/P MVR (mitral valve replacement)     -porcine MVR 2/2013  -recent TTE with well seated valve; LINDA earlier this week for further evaluation of mitral valve with moderate MR     ? symptomatic moderate AS  - c/w medical mx, pt/ot   hypokalemia  - will give po potassium supplement    -Leukocytosis  - will repeat cbc, if still elevated tomorrow, will consider HCAP.        William Quiroz MD  Cardiology  [facility]

## 2018-11-25 LAB
ANION GAP SERPL CALC-SCNC: 9 MMOL/L
BASOPHILS # BLD AUTO: 0.01 K/UL
BASOPHILS NFR BLD: 0.1 %
BNP SERPL-MCNC: 244 PG/ML
BUN SERPL-MCNC: 23 MG/DL
CALCIUM SERPL-MCNC: 9.2 MG/DL
CHLORIDE SERPL-SCNC: 92 MMOL/L
CO2 SERPL-SCNC: 29 MMOL/L
CREAT SERPL-MCNC: 0.9 MG/DL
DIFFERENTIAL METHOD: ABNORMAL
EOSINOPHIL # BLD AUTO: 0 K/UL
EOSINOPHIL NFR BLD: 0.3 %
ERYTHROCYTE [DISTWIDTH] IN BLOOD BY AUTOMATED COUNT: 12.4 %
EST. GFR  (AFRICAN AMERICAN): >60 ML/MIN/1.73 M^2
EST. GFR  (NON AFRICAN AMERICAN): >60 ML/MIN/1.73 M^2
GLUCOSE SERPL-MCNC: 121 MG/DL
HCT VFR BLD AUTO: 37.6 %
HGB BLD-MCNC: 13 G/DL
LYMPHOCYTES # BLD AUTO: 1.2 K/UL
LYMPHOCYTES NFR BLD: 10 %
MAGNESIUM SERPL-MCNC: 1.7 MG/DL
MCH RBC QN AUTO: 30.4 PG
MCHC RBC AUTO-ENTMCNC: 34.6 G/DL
MCV RBC AUTO: 88 FL
MONOCYTES # BLD AUTO: 1.3 K/UL
MONOCYTES NFR BLD: 11.2 %
NEUTROPHILS # BLD AUTO: 9.3 K/UL
NEUTROPHILS NFR BLD: 78 %
PLATELET # BLD AUTO: 207 K/UL
PMV BLD AUTO: 10.5 FL
POTASSIUM SERPL-SCNC: 2.9 MMOL/L
RBC # BLD AUTO: 4.27 M/UL
SODIUM SERPL-SCNC: 130 MMOL/L
WBC # BLD AUTO: 11.89 K/UL

## 2018-11-25 PROCEDURE — 27000221 HC OXYGEN, UP TO 24 HOURS

## 2018-11-25 PROCEDURE — 63600175 PHARM REV CODE 636 W HCPCS: Performed by: STUDENT IN AN ORGANIZED HEALTH CARE EDUCATION/TRAINING PROGRAM

## 2018-11-25 PROCEDURE — 25000242 PHARM REV CODE 250 ALT 637 W/ HCPCS: Performed by: STUDENT IN AN ORGANIZED HEALTH CARE EDUCATION/TRAINING PROGRAM

## 2018-11-25 PROCEDURE — 85025 COMPLETE CBC W/AUTO DIFF WBC: CPT

## 2018-11-25 PROCEDURE — 80048 BASIC METABOLIC PNL TOTAL CA: CPT

## 2018-11-25 PROCEDURE — G0378 HOSPITAL OBSERVATION PER HR: HCPCS

## 2018-11-25 PROCEDURE — 83880 ASSAY OF NATRIURETIC PEPTIDE: CPT

## 2018-11-25 PROCEDURE — 83735 ASSAY OF MAGNESIUM: CPT

## 2018-11-25 PROCEDURE — 99225 PR SUBSEQUENT OBSERVATION CARE,LEVEL II: CPT | Mod: ,,, | Performed by: STUDENT IN AN ORGANIZED HEALTH CARE EDUCATION/TRAINING PROGRAM

## 2018-11-25 PROCEDURE — 25000003 PHARM REV CODE 250: Performed by: NURSE PRACTITIONER

## 2018-11-25 PROCEDURE — 94761 N-INVAS EAR/PLS OXIMETRY MLT: CPT

## 2018-11-25 PROCEDURE — 36415 COLL VENOUS BLD VENIPUNCTURE: CPT

## 2018-11-25 PROCEDURE — 25000003 PHARM REV CODE 250: Performed by: STUDENT IN AN ORGANIZED HEALTH CARE EDUCATION/TRAINING PROGRAM

## 2018-11-25 PROCEDURE — 94640 AIRWAY INHALATION TREATMENT: CPT

## 2018-11-25 PROCEDURE — 63600175 PHARM REV CODE 636 W HCPCS: Performed by: NURSE PRACTITIONER

## 2018-11-25 RX ORDER — POTASSIUM CHLORIDE 20 MEQ/1
40 TABLET, EXTENDED RELEASE ORAL 2 TIMES DAILY
Status: DISCONTINUED | OUTPATIENT
Start: 2018-11-25 | End: 2018-11-29 | Stop reason: HOSPADM

## 2018-11-25 RX ORDER — MAGNESIUM SULFATE 1 G/100ML
1 INJECTION INTRAVENOUS ONCE
Status: COMPLETED | OUTPATIENT
Start: 2018-11-25 | End: 2018-11-25

## 2018-11-25 RX ORDER — IPRATROPIUM BROMIDE AND ALBUTEROL SULFATE 2.5; .5 MG/3ML; MG/3ML
3 SOLUTION RESPIRATORY (INHALATION)
Status: DISCONTINUED | OUTPATIENT
Start: 2018-11-25 | End: 2018-11-28

## 2018-11-25 RX ADMIN — MAGNESIUM SULFATE HEPTAHYDRATE 1 G: 1 INJECTION, SOLUTION INTRAVENOUS at 12:11

## 2018-11-25 RX ADMIN — METOPROLOL TARTRATE 50 MG: 50 TABLET ORAL at 09:11

## 2018-11-25 RX ADMIN — ENOXAPARIN SODIUM 40 MG: 100 INJECTION SUBCUTANEOUS at 05:11

## 2018-11-25 RX ADMIN — AMLODIPINE BESYLATE 5 MG: 5 TABLET ORAL at 09:11

## 2018-11-25 RX ADMIN — SPIRONOLACTONE 25 MG: 25 TABLET ORAL at 09:11

## 2018-11-25 RX ADMIN — IPRATROPIUM BROMIDE AND ALBUTEROL SULFATE 3 ML: .5; 3 SOLUTION RESPIRATORY (INHALATION) at 01:11

## 2018-11-25 RX ADMIN — FUROSEMIDE 80 MG: 10 INJECTION, SOLUTION INTRAMUSCULAR; INTRAVENOUS at 05:11

## 2018-11-25 RX ADMIN — PANTOPRAZOLE SODIUM 40 MG: 40 TABLET, DELAYED RELEASE ORAL at 09:11

## 2018-11-25 RX ADMIN — METOPROLOL TARTRATE 50 MG: 50 TABLET ORAL at 08:11

## 2018-11-25 RX ADMIN — IPRATROPIUM BROMIDE AND ALBUTEROL SULFATE 3 ML: .5; 3 SOLUTION RESPIRATORY (INHALATION) at 08:11

## 2018-11-25 RX ADMIN — ASPIRIN 81 MG: 81 TABLET, COATED ORAL at 09:11

## 2018-11-25 RX ADMIN — POTASSIUM CHLORIDE 40 MEQ: 20 TABLET, EXTENDED RELEASE ORAL at 08:11

## 2018-11-25 RX ADMIN — FUROSEMIDE 80 MG: 10 INJECTION, SOLUTION INTRAMUSCULAR; INTRAVENOUS at 09:11

## 2018-11-25 RX ADMIN — POTASSIUM CHLORIDE 20 MEQ: 1500 TABLET, EXTENDED RELEASE ORAL at 09:11

## 2018-11-25 RX ADMIN — ATORVASTATIN CALCIUM 40 MG: 40 TABLET, FILM COATED ORAL at 09:11

## 2018-11-25 RX ADMIN — SIMETHICONE CHEW TAB 80 MG 80 MG: 80 TABLET ORAL at 06:11

## 2018-11-25 NOTE — PLAN OF CARE
Problem: Patient Care Overview  Goal: Plan of Care Review  Outcome: Ongoing (interventions implemented as appropriate)  I have reviewed the plan of care with the pt. The pt . No true red alarms noted on tele. Safety measures are in place and the bed is locked and in the lowest position. The call light is in reach, the bed alarm is on, and family remains at the bedside. The pt verbalizes full understanding of their plan of care.

## 2018-11-25 NOTE — NURSING
Case Management Note  Attention MD  Patient has been in Observation Level since 11-. This if for short term treatment only generally 24 to 30 hours. Please document the risk /signs and symptoms and medical necessity for treatment as inpatient acute. Generally after 30 hours these services are not covered by Medicare.  Case Management   Director Yari Garland

## 2018-11-25 NOTE — PROGRESS NOTES
[facility]  Cardiology  Progress Note    Patient Name: Tara Mondragon  MRN: [id number]  Admission Date: 11/23/2018  Hospital Length of Stay: 0 days  Code Status: Full Code   Attending Physician: William Quiroz MD   Primary Care Physician: Jaun Mata MD  Expected Discharge Date:   Principal Problem:Acute on chronic diastolic heart failure    Subjective:     11/23/2018 Presented to the ER with complaints of SOB with progressive worsening.  slightly higher than previous reading at 141. Troponin .010. CXR  suggestive of pulmonary edema/CHF with possible small right pleural effusion worse than previous CXR. BMP and CBC WNL. Given Lasix 80mg IV in the ER with 250cc out so far. Will admit to observation under care of [facility] for ADHF related to dietary indiscretion of salt intake    11/24: Pt had subjective dyspnea with talking. Pt only diuresis about 2 L of fluid, net neg only 1 liter. Renal function is stable.  No ovenight issues.    11/25: Pt had HERNANDES, with hypoxia with ambulating down the fonseca, O2 sat > 86. Pt has wheezes on exam. UOP ~ 2L, net neg 1.5.  Chest pain free    Review of Systems   Constitution: Negative for chills, decreased appetite, weakness, malaise/fatigue and weight gain.   HENT: Negative for congestion and ear discharge.    Eyes: Negative for blurred vision and double vision.   Cardiovascular: Positive for dyspnea on exertion. Negative for chest pain, cyanosis, irregular heartbeat, leg swelling, near-syncope, orthopnea, palpitations and syncope.   Respiratory: Positive for shortness of breath. Negative for cough and sleep disturbances due to breathing.    Skin: Negative for color change and dry skin.   Musculoskeletal: Negative for joint pain, joint swelling and muscle cramps.   Gastrointestinal: Positive for bloating and change in bowel habit (constipation). Negative for heartburn, hematemesis and hematochezia.   Genitourinary: Negative for bladder incontinence and  dysuria.   Neurological: Negative for aphonia, excessive daytime sleepiness, dizziness, focal weakness, headaches, light-headedness and loss of balance.   Psychiatric/Behavioral: Negative for altered mental status, depression and memory loss. The patient does not have insomnia and is not nervous/anxious.      Objective:     Vital Signs (Most Recent):  Temp: 97.1 °F (36.2 °C) (11/25/18 1656)  Pulse: 92 (11/25/18 1656)  Resp: 16 (11/25/18 1656)  BP: 126/72 (11/25/18 1656)  SpO2: 95 % (11/25/18 1656) Vital Signs (24h Range):  Temp:  [96.3 °F (35.7 °C)-98.5 °F (36.9 °C)] 97.1 °F (36.2 °C)  Pulse:  [] 92  Resp:  [16-19] 16  SpO2:  [92 %-98 %] 95 %  BP: ()/(53-73) 126/72     Weight: 85.3 kg (188 lb 0.8 oz)  Body mass index is 30.35 kg/m².    SpO2: 95 %  O2 Device (Oxygen Therapy): nasal cannula      Intake/Output Summary (Last 24 hours) at 11/25/2018 1735  Last data filed at 11/25/2018 1528  Gross per 24 hour   Intake 305 ml   Output 1950 ml   Net -1645 ml       Lines/Drains/Airways     Peripheral Intravenous Line                 Peripheral IV - Single Lumen 11/23/18 0632 Right Antecubital 2 days                Physical Exam   Constitutional: She is oriented to person, place, and time. She appears well-developed and well-nourished.   HENT:   Head: Normocephalic and atraumatic.   Eyes: Conjunctivae and EOM are normal.   Neck: Normal range of motion. Neck supple. No JVD present.   Cardiovascular: Normal rate and regular rhythm.   Murmur heard.  Pulmonary/Chest: Effort normal and breath sounds normal. No respiratory distress. She has no wheezes. She has no rales.   Abdominal: Soft. Bowel sounds are normal. She exhibits no distension.   Musculoskeletal: Normal range of motion. She exhibits no edema.   Neurological: She is alert and oriented to person, place, and time.   Skin: Skin is warm and dry. No erythema.   Psychiatric: She has a normal mood and affect. Her behavior is normal. Judgment and thought content  normal.   Nursing note and vitals reviewed.      Significant Labs:   BMP:   Recent Labs   Lab 11/24/18  0507 11/25/18  0353    121*   * 130*   K 3.0* 2.9*   CL 91* 92*   CO2 27 29   BUN 23 23   CREATININE 1.0 0.9   CALCIUM 9.5 9.2   MG 1.7 1.7   , CBC   Recent Labs   Lab 11/25/18  0852   WBC 11.89   HGB 13.0   HCT 37.6      , Lipid Panel No results for input(s): CHOL, HDL, LDLCALC, TRIG, CHOLHDL in the last 48 hours. and Troponin   No results for input(s): TROPONINI in the last 48 hours.    Significant Imaging: Echocardiogram:   Transthoracic echo (TTE) complete (Cupid Only):   Results for orders placed or performed during the hospital encounter of 11/17/18   Transthoracic echo (TTE) complete (Cupid Only)   Result Value Ref Range    BSA 1.95 m2    LA WIDTH 3.80 cm    LVIDD 4.45 3.5 - 6.0 cm    IVS 1.19 (A) 0.6 - 1.1 cm    PW 1.25 (A) 0.6 - 1.1 cm    Ao root annulus 2.67 cm    LVIDS 2.38 2.1 - 4.0 cm    FS 47 28 - 44 %    LA volume 70.05 cm3    LV mass 199.42 g    LA size 4.49 cm    RVDD 2.59 cm    Left Ventricle Relative Wall Thickness 0.56 cm    AV mean gradient 27 mmHg    AV valve area 1.13 cm2    E/A ratio 1.28     E wave decelartion time 220.46 msec    LVOT diameter 1.90 cm    LVOT area 2.83 cm2    LVOT peak VTI 29.83 cm    Ao peak tristan 2.83 m/s    Ao VTI 75.00 cm    LVOT stroke volume 84.53 cm3    AV peak gradient 32.04 mmHg    MV Peak E Tristan 2.16 m/s    TR Max Tristan 3.72 m/s    MV Peak A Tristan 1.69 m/s    LV Systolic Volume 19.69 mL    LV Systolic Volume Index 10.1 mL/m2    LV Diastolic Volume 90.13 mL    LV Diastolic Volume Index 46.22 mL/m2    LA Volume Index 35.9 mL/m2    LV Mass Index 102.3 g/m2    Right Atrium Major Axis 3.93 cm    Left Atrium Minor Axis 4.83 cm    Left Atrium Major Axis 4.83 cm    Triscuspid Valve Regurgitation Peak Gradient 55.35 mmHg    Right Atrial Pressure (from IVC) 3 mmHg    MV mean gradient 8 mmHg    MV valve area p 1/2 method 2.97 cm2    TV rest pulmonary artery  pressure 58.35 mmHg    MV stenosis pressure 1/2 time 74 ms     Assessment and Plan:         Active Diagnoses:    Diagnosis Date Noted POA    PRINCIPAL PROBLEM:  Acute on chronic diastolic heart failure [I50.33] 11/23/2018 Yes    Shortness of breath [R06.02] 11/23/2018 Yes    Hypertension [I10]  Yes    S/P MVR (mitral valve replacement) [Z95.2] 02/27/2013 Not Applicable      Problems Resolved During this Admission:       Acute on chronic diastolic heart failure     -recent TTE in 11/2018 with normal LVEF and indeterminate diastolic dysfunction; previous echo in 2013 with diastolic dysfunction  -related to dietary indiscretion of salt intake; reinforced importance of strict adherence to low salt diet  - BNP is stable, but stil mildly elevated at 250 (290)  - maybe is this more valvular heart disease than we realize.   -continue Norvasc, Metoprolol and Aldactone; will consult CHF education nurse for dietary education/reinforcement   - will need pt/ot home health as outpt      Shortness of breath     -related to ADHF  -management as detailed previously   - c/w PT/OT  - will consider PFTs on Monday  - pt desat to 86% with ambulation down the fonseca.   - she may require home O2  - wheezes are noted on exam, will get breath tx when awake.   Hypertension     -stable upon presentation   -continue CCB, BB and Aldactone       S/P MVR (mitral valve replacement)     -porcine MVR 2/2013  -recent TTE with well seated valve; LINDA earlier this week for further evaluation of mitral valve with moderate MR     ? symptomatic moderate AS  - c/w medical mx, pt/ot   hypokalemia  - will give po potassium supplement, increased to 40 bid, will give Mg supplement as well.    -Leukocytosis  - improved       - dispo: will plan for PFT tomorrow and see if pt qualifies for home O2, looks to be euvolemic by exam.    William Quiroz MD  Cardiology  [facility]

## 2018-11-25 NOTE — PLAN OF CARE
Problem: Patient Care Overview  Goal: Plan of Care Review  Outcome: Revised  NAD noted. PT urinated frequently throughout the night. Pt denied chest pain. Pt remains on 2L NC. Pt remains SR on tele. NO true red alarms noted. Fall precautions maintained

## 2018-11-26 ENCOUNTER — TELEPHONE (OUTPATIENT)
Dept: FAMILY MEDICINE | Facility: CLINIC | Age: 73
End: 2018-11-26

## 2018-11-26 PROBLEM — I50.9 CONGESTIVE HEART FAILURE: Status: ACTIVE | Noted: 2018-11-26

## 2018-11-26 LAB
ANION GAP SERPL CALC-SCNC: 10 MMOL/L
BUN SERPL-MCNC: 24 MG/DL
CALCIUM SERPL-MCNC: 9.2 MG/DL
CHLORIDE SERPL-SCNC: 91 MMOL/L
CO2 SERPL-SCNC: 29 MMOL/L
CREAT SERPL-MCNC: 1 MG/DL
EST. GFR  (AFRICAN AMERICAN): >60 ML/MIN/1.73 M^2
EST. GFR  (NON AFRICAN AMERICAN): 56 ML/MIN/1.73 M^2
GLUCOSE SERPL-MCNC: 130 MG/DL
MAGNESIUM SERPL-MCNC: 2 MG/DL
POTASSIUM SERPL-SCNC: 4.1 MMOL/L
SODIUM SERPL-SCNC: 130 MMOL/L

## 2018-11-26 PROCEDURE — 25000003 PHARM REV CODE 250: Performed by: NURSE PRACTITIONER

## 2018-11-26 PROCEDURE — 94640 AIRWAY INHALATION TREATMENT: CPT

## 2018-11-26 PROCEDURE — 63600175 PHARM REV CODE 636 W HCPCS: Performed by: NURSE PRACTITIONER

## 2018-11-26 PROCEDURE — 80048 BASIC METABOLIC PNL TOTAL CA: CPT

## 2018-11-26 PROCEDURE — 27000221 HC OXYGEN, UP TO 24 HOURS

## 2018-11-26 PROCEDURE — 36415 COLL VENOUS BLD VENIPUNCTURE: CPT

## 2018-11-26 PROCEDURE — 94761 N-INVAS EAR/PLS OXIMETRY MLT: CPT

## 2018-11-26 PROCEDURE — 11000001 HC ACUTE MED/SURG PRIVATE ROOM

## 2018-11-26 PROCEDURE — 99225 PR SUBSEQUENT OBSERVATION CARE,LEVEL II: CPT | Mod: ,,, | Performed by: STUDENT IN AN ORGANIZED HEALTH CARE EDUCATION/TRAINING PROGRAM

## 2018-11-26 PROCEDURE — 83735 ASSAY OF MAGNESIUM: CPT

## 2018-11-26 PROCEDURE — 25000003 PHARM REV CODE 250: Performed by: STUDENT IN AN ORGANIZED HEALTH CARE EDUCATION/TRAINING PROGRAM

## 2018-11-26 PROCEDURE — 97116 GAIT TRAINING THERAPY: CPT

## 2018-11-26 PROCEDURE — 25000242 PHARM REV CODE 250 ALT 637 W/ HCPCS: Performed by: STUDENT IN AN ORGANIZED HEALTH CARE EDUCATION/TRAINING PROGRAM

## 2018-11-26 RX ADMIN — POTASSIUM CHLORIDE 40 MEQ: 20 TABLET, EXTENDED RELEASE ORAL at 08:11

## 2018-11-26 RX ADMIN — CHLOROTHIAZIDE SODIUM 250 MG: 500 INJECTION, POWDER, LYOPHILIZED, FOR SOLUTION INTRAVENOUS at 05:11

## 2018-11-26 RX ADMIN — SPIRONOLACTONE 25 MG: 25 TABLET ORAL at 08:11

## 2018-11-26 RX ADMIN — FUROSEMIDE 80 MG: 10 INJECTION, SOLUTION INTRAMUSCULAR; INTRAVENOUS at 06:11

## 2018-11-26 RX ADMIN — IPRATROPIUM BROMIDE AND ALBUTEROL SULFATE 3 ML: .5; 3 SOLUTION RESPIRATORY (INHALATION) at 07:11

## 2018-11-26 RX ADMIN — METOPROLOL TARTRATE 50 MG: 50 TABLET ORAL at 08:11

## 2018-11-26 RX ADMIN — IPRATROPIUM BROMIDE AND ALBUTEROL SULFATE 3 ML: .5; 3 SOLUTION RESPIRATORY (INHALATION) at 01:11

## 2018-11-26 RX ADMIN — ATORVASTATIN CALCIUM 40 MG: 40 TABLET, FILM COATED ORAL at 08:11

## 2018-11-26 RX ADMIN — POTASSIUM CHLORIDE 40 MEQ: 20 TABLET, EXTENDED RELEASE ORAL at 09:11

## 2018-11-26 RX ADMIN — FUROSEMIDE 80 MG: 10 INJECTION, SOLUTION INTRAMUSCULAR; INTRAVENOUS at 08:11

## 2018-11-26 RX ADMIN — ASPIRIN 81 MG: 81 TABLET, COATED ORAL at 08:11

## 2018-11-26 RX ADMIN — AMLODIPINE BESYLATE 5 MG: 5 TABLET ORAL at 08:11

## 2018-11-26 RX ADMIN — ENOXAPARIN SODIUM 40 MG: 100 INJECTION SUBCUTANEOUS at 06:11

## 2018-11-26 RX ADMIN — METOPROLOL TARTRATE 50 MG: 50 TABLET ORAL at 09:11

## 2018-11-26 RX ADMIN — PANTOPRAZOLE SODIUM 40 MG: 40 TABLET, DELAYED RELEASE ORAL at 08:11

## 2018-11-26 NOTE — PLAN OF CARE
Problem: Physical Therapy Goal  Goal: Physical Therapy Goal  Goals to be met by: 2018     Patient will increase functional independence with mobility by performin. Gait  x >200 feet with Modified [place] using no AD.      Outcome: Ongoing (interventions implemented as appropriate)  Home with outpatient cardiopulmonary rehab   Patient with increase SOB with min exertion today

## 2018-11-26 NOTE — SUBJECTIVE & OBJECTIVE
Review of Systems   Constitution: Negative for chills, decreased appetite, diaphoresis, fever and weakness.   Cardiovascular: Positive for dyspnea on exertion. Negative for chest pain, claudication, cyanosis, irregular heartbeat, leg swelling, near-syncope, orthopnea, palpitations, paroxysmal nocturnal dyspnea and syncope.   Respiratory: Positive for cough. Negative for hemoptysis, shortness of breath and wheezing.    Gastrointestinal: Negative for bloating, abdominal pain, constipation, diarrhea, melena, nausea and vomiting.   Neurological: Negative for dizziness.     Objective:     Vital Signs (Most Recent):  Temp: 97.9 °F (36.6 °C) (11/26/18 0816)  Pulse: 105 (11/26/18 0816)  Resp: 20 (11/26/18 0816)  BP: (!) 109/57 (11/26/18 0816)  SpO2: 95 % (11/26/18 0718) Vital Signs (24h Range):  Temp:  [96.3 °F (35.7 °C)-98.4 °F (36.9 °C)] 97.9 °F (36.6 °C)  Pulse:  [] 105  Resp:  [16-22] 20  SpO2:  [92 %-98 %] 95 %  BP: (104-148)/(54-72) 109/57     Weight: 85.6 kg (188 lb 11.4 oz)  Body mass index is 30.46 kg/m².     SpO2: 95 %  O2 Device (Oxygen Therapy): nasal cannula      Intake/Output Summary (Last 24 hours) at 11/26/2018 0958  Last data filed at 11/26/2018 0941  Gross per 24 hour   Intake 985 ml   Output 2030 ml   Net -1045 ml       Lines/Drains/Airways     Peripheral Intravenous Line                 Peripheral IV - Single Lumen 11/23/18 0632 Right Antecubital 3 days                Physical Exam   Constitutional: She is oriented to person, place, and time. She appears well-developed and well-nourished. No distress.   Cardiovascular: Normal rate and regular rhythm. Exam reveals no gallop.   No murmur heard.  Pulmonary/Chest: Effort normal. No respiratory distress. She has decreased breath sounds. She has no wheezes.   Abdominal: Soft. Bowel sounds are normal. She exhibits no distension. There is no tenderness.   Neurological: She is alert and oriented to person, place, and time.   Skin: Skin is warm and dry.        Significant Labs:     Recent Labs   Lab 11/26/18  0404   *   K 4.1   CL 91*   CO2 29   BUN 24*   CREATININE 1.0   MG 2.0         Significant Imaging: Echocardiogram:   Transthoracic echo (TTE) complete (Cupid Only):   Results for orders placed or performed during the hospital encounter of 11/17/18   Transthoracic echo (TTE) complete (Cupid Only)   Result Value Ref Range    BSA 1.95 m2    LA WIDTH 3.80 cm    LVIDD 4.45 3.5 - 6.0 cm    IVS 1.19 (A) 0.6 - 1.1 cm    PW 1.25 (A) 0.6 - 1.1 cm    Ao root annulus 2.67 cm    LVIDS 2.38 2.1 - 4.0 cm    FS 47 28 - 44 %    LA volume 70.05 cm3    LV mass 199.42 g    LA size 4.49 cm    RVDD 2.59 cm    Left Ventricle Relative Wall Thickness 0.56 cm    AV mean gradient 27 mmHg    AV valve area 1.13 cm2    E/A ratio 1.28     E wave decelartion time 220.46 msec    LVOT diameter 1.90 cm    LVOT area 2.83 cm2    LVOT peak VTI 29.83 cm    Ao peak tristan 2.83 m/s    Ao VTI 75.00 cm    LVOT stroke volume 84.53 cm3    AV peak gradient 32.04 mmHg    MV Peak E Tristan 2.16 m/s    TR Max Tristan 3.72 m/s    MV Peak A Tristan 1.69 m/s    LV Systolic Volume 19.69 mL    LV Systolic Volume Index 10.1 mL/m2    LV Diastolic Volume 90.13 mL    LV Diastolic Volume Index 46.22 mL/m2    LA Volume Index 35.9 mL/m2    LV Mass Index 102.3 g/m2    Right Atrium Major Axis 3.93 cm    Left Atrium Minor Axis 4.83 cm    Left Atrium Major Axis 4.83 cm    Triscuspid Valve Regurgitation Peak Gradient 55.35 mmHg    Right Atrial Pressure (from IVC) 3 mmHg    MV mean gradient 8 mmHg    MV valve area p 1/2 method 2.97 cm2    TV rest pulmonary artery pressure 58.35 mmHg    MV stenosis pressure 1/2 time 74 ms

## 2018-11-26 NOTE — ASSESSMENT & PLAN NOTE
-recent TTE in 11/2018 with normal LVEF and indeterminate diastolic dysfunction; previous echo in 2013 with diastolic dysfunction  -LINDA last week for evaluation of AS (mild AS) and moderate MR  -related to dietary indiscretion of salt intake; reinforced importance of strict adherence to low salt diet; seen by CHF nurse on Friday for dietary reinforcement  -volume overloaded on exam and pulmonary edema on CXR upon admission; placed on IV Lasix BID with 1.7 liters out overnight and negative 3.4 liters since admission; SOB with ambulation to the bathroom this AM; will continue IV Lasix and proceed with PFTs today as well as repeat CXR; if SOB persists and does not improve despite IV diuresis will consider RHC vs PE evaluation   -continue Norvasc, Metoprolol and Aldactone

## 2018-11-26 NOTE — PROGRESS NOTES
[facility]  Cardiology  Progress Note    Patient Name: Tara Mondragon  MRN: [id number]  Admission Date: 11/23/2018  Hospital Length of Stay: 0 days  Code Status: Full Code   Attending Physician: William Quiroz MD   Primary Care Physician: Jaun Mata MD  Expected Discharge Date:   Principal Problem:Acute on chronic diastolic heart failure    Subjective:     Hospital Course:   11/23/2018 Presented to the ER with complaints of SOB with progressive worsening.  slightly higher than previous reading at 141. Troponin .010. CXR  suggestive of pulmonary edema/CHF with possible small right pleural effusion worse than previous CXR. BMP and CBC WNL. Given Lasix 80mg IV in the ER with 250cc out so far. Will admit to observation under care of [facility] for ADHF related to dietary indiscretion of salt intake  11/24/2018 Pt had subjective dyspnea with talking. Pt only diuresis about 2 L of fluid, net neg only 1 liter. Renal function is stable.  No ovenight issues.  11/25/2018 Pt had HERNANDES, with hypoxia with ambulating down the fonseca, O2 sat > 86. Pt has wheezes on exam. UOP ~ 2L, net neg 1.5.  Chest pain free  11/26/2018 Remains on IV Lasix BID with 1.7 liters out overnight and negative 3.4 liters since admission. SOB with ambulating to bathroom this AM. Will repeat ambulatory pulse ox. Proceed with PFTs today as well as CXR PA& lat. HR and BP stable         Review of Systems   Constitution: Negative for chills, decreased appetite, diaphoresis, fever and weakness.   Cardiovascular: Positive for dyspnea on exertion. Negative for chest pain, claudication, cyanosis, irregular heartbeat, leg swelling, near-syncope, orthopnea, palpitations, paroxysmal nocturnal dyspnea and syncope.   Respiratory: Positive for cough. Negative for hemoptysis, shortness of breath and wheezing.    Gastrointestinal: Negative for bloating, abdominal pain, constipation, diarrhea, melena, nausea and vomiting.   Neurological:  Negative for dizziness.     Objective:     Vital Signs (Most Recent):  Temp: 97.9 °F (36.6 °C) (11/26/18 0816)  Pulse: 105 (11/26/18 0816)  Resp: 20 (11/26/18 0816)  BP: (!) 109/57 (11/26/18 0816)  SpO2: 95 % (11/26/18 0718) Vital Signs (24h Range):  Temp:  [96.3 °F (35.7 °C)-98.4 °F (36.9 °C)] 97.9 °F (36.6 °C)  Pulse:  [] 105  Resp:  [16-22] 20  SpO2:  [92 %-98 %] 95 %  BP: (104-148)/(54-72) 109/57     Weight: 85.6 kg (188 lb 11.4 oz)  Body mass index is 30.46 kg/m².     SpO2: 95 %  O2 Device (Oxygen Therapy): nasal cannula      Intake/Output Summary (Last 24 hours) at 11/26/2018 0958  Last data filed at 11/26/2018 0941  Gross per 24 hour   Intake 985 ml   Output 2030 ml   Net -1045 ml       Lines/Drains/Airways     Peripheral Intravenous Line                 Peripheral IV - Single Lumen 11/23/18 0632 Right Antecubital 3 days                Physical Exam   Constitutional: She is oriented to person, place, and time. She appears well-developed and well-nourished. No distress.   Cardiovascular: Normal rate and regular rhythm. Exam reveals no gallop.   No murmur heard.  Pulmonary/Chest: Effort normal. No respiratory distress. She has decreased breath sounds. She has no wheezes.   Abdominal: Soft. Bowel sounds are normal. She exhibits no distension. There is no tenderness.   Neurological: She is alert and oriented to person, place, and time.   Skin: Skin is warm and dry.       Significant Labs:     Recent Labs   Lab 11/26/18  0404   *   K 4.1   CL 91*   CO2 29   BUN 24*   CREATININE 1.0   MG 2.0         Significant Imaging: Echocardiogram:   Transthoracic echo (TTE) complete (Cupid Only):   Results for orders placed or performed during the hospital encounter of 11/17/18   Transthoracic echo (TTE) complete (Cupid Only)   Result Value Ref Range    BSA 1.95 m2    LA WIDTH 3.80 cm    LVIDD 4.45 3.5 - 6.0 cm    IVS 1.19 (A) 0.6 - 1.1 cm    PW 1.25 (A) 0.6 - 1.1 cm    Ao root annulus 2.67 cm    LVIDS 2.38 2.1 -  4.0 cm    FS 47 28 - 44 %    LA volume 70.05 cm3    LV mass 199.42 g    LA size 4.49 cm    RVDD 2.59 cm    Left Ventricle Relative Wall Thickness 0.56 cm    AV mean gradient 27 mmHg    AV valve area 1.13 cm2    E/A ratio 1.28     E wave decelartion time 220.46 msec    LVOT diameter 1.90 cm    LVOT area 2.83 cm2    LVOT peak VTI 29.83 cm    Ao peak tristan 2.83 m/s    Ao VTI 75.00 cm    LVOT stroke volume 84.53 cm3    AV peak gradient 32.04 mmHg    MV Peak E Tristan 2.16 m/s    TR Max Tristan 3.72 m/s    MV Peak A Tristan 1.69 m/s    LV Systolic Volume 19.69 mL    LV Systolic Volume Index 10.1 mL/m2    LV Diastolic Volume 90.13 mL    LV Diastolic Volume Index 46.22 mL/m2    LA Volume Index 35.9 mL/m2    LV Mass Index 102.3 g/m2    Right Atrium Major Axis 3.93 cm    Left Atrium Minor Axis 4.83 cm    Left Atrium Major Axis 4.83 cm    Triscuspid Valve Regurgitation Peak Gradient 55.35 mmHg    Right Atrial Pressure (from IVC) 3 mmHg    MV mean gradient 8 mmHg    MV valve area p 1/2 method 2.97 cm2    TV rest pulmonary artery pressure 58.35 mmHg    MV stenosis pressure 1/2 time 74 ms     Assessment and Plan:     Brief HPI: Seen on AM NP rounds with  at the bedside. Denies any complaints currently but appears SOB on exam. Discussed POC as detailed below with patient-verbalized understanding and agrees with POC     * Acute on chronic diastolic heart failure    -recent TTE in 11/2018 with normal LVEF and indeterminate diastolic dysfunction; previous echo in 2013 with diastolic dysfunction  -LINDA last week for evaluation of AS (mild AS) and moderate MR  -related to dietary indiscretion of salt intake; reinforced importance of strict adherence to low salt diet; seen by CHF nurse on Friday for dietary reinforcement  -volume overloaded on exam and pulmonary edema on CXR upon admission; placed on IV Lasix BID with 1.7 liters out overnight and negative 3.4 liters since admission; SOB with ambulation to the bathroom this AM; will continue IV  Lasix and proceed with PFTs today as well as repeat CXR; if SOB persists and does not improve despite IV diuresis will consider RHC vs PE evaluation   -continue Norvasc, Metoprolol and Aldactone     Shortness of breath    -orginally felt to be related to ADHF  -aggressive diuresis in process  -proceed with PFTs for evaluation of pulmonary etiology as source of SOB      Hypertension    -SBP 100s-120s overnight  -stable upon presentation   -continue CCB, BB and Aldactone      S/P MVR (mitral valve replacement)    -porcine MVR 2/2013  -recent TTE with well seated valve; LINDA earlier this week for further evaluation of mitral valve with moderate MR          VTE Risk Mitigation (From admission, onward)        Ordered     enoxaparin injection 40 mg  Daily      11/23/18 1210     IP VTE HIGH RISK PATIENT  Once      11/23/18 1210        Discussed case with staff. PFTs to be done in AM. D Dimer reviewed with low suspicion of PE currently. SOB not dramatically improved despite IV diuresis. Decent response with IV Lasix BID although would expect her to be more negative. Will add Diuril IVPB prior to evening dose of Lasix. Will consult Pulmonary for input and to determine if SOB could be related to pulmonary issue.   RHONA De Leon, ANP  Cardiology  [facility]

## 2018-11-26 NOTE — PLAN OF CARE
Problem: Patient Care Overview  Goal: Plan of Care Review  Outcome: Ongoing (interventions implemented as appropriate)  Plan of care reviewed with patient. Voices understanding. NSR on monitor with no red alarms noted. Diuril and IV lasix to be administered per orders. Pulmonary function test ordered for tomorrow. Up out of bed to bathroom several times today. Also ambulated with PT. No acute distress noted at this time. Side rails x3, bed low, call bell within reach. Maintain bed alarm for patient safety. Patient will be monitored overnight.

## 2018-11-26 NOTE — PLAN OF CARE
Rounded on patient. Patient remains in hospital on oxygen (does NOT wear home oxygen)    Discussed with patient that she prefers early am appointments.    Sent message to Dr. Mata's office for early am followup appointment    Patient on progressive mobility program- will discuss with nurse to ambulate patient as patient requesting to ambulate.    Future Appointments   Date Time Provider Department Center   1/17/2019  8:00 AM Qing Duffy MD [facility] CARDIO [facility]        11/26/18 0957   Discharge Reassessment   Assessment Type Discharge Planning Reassessment   Provided patient/caregiver education on the expected discharge date and the discharge plan Yes   Discharge Plan A Home;Home with family   Discharge Plan B Home;Home with family     Marla Medrano, RN, CCM, CMSRN  RN Transition Navigator  [phone]

## 2018-11-26 NOTE — ASSESSMENT & PLAN NOTE
-orginally felt to be related to ADHF  -aggressive diuresis in process  -proceed with PFTs for evaluation of pulmonary etiology as source of SOB

## 2018-11-26 NOTE — PLAN OF CARE
Problem: Patient Care Overview  Goal: Plan of Care Review  Outcome: Ongoing (interventions implemented as appropriate)  Patient remained in bed with bed alarm on, yellow fall risk socks, and yellow fall risk band. Id band on, wheels locked, bed in lowest position, and call light within reach. Plan of care reviewed with patient and family.Verbalized understanding.  Vital signs remained stable. NSR on telemetry. Iv remained intact. Patient had no complaints of pain for the shift. Patient ambulated easily with stand-by assistance. Patient kept 2L NC on. Got slightly SOB with ambulation. Will continue to monitor.

## 2018-11-26 NOTE — NURSING
Home Oxygen Evaluation    Date Performed: 2018    1) Patient's Home O2 Sat on room air, while at rest: 94%        2) Patient's O2 Sat on room air while exercisin%          3) Patient's O2 Sat while exercising on O2: 91% 1 liter nc

## 2018-11-26 NOTE — PT/OT/SLP PROGRESS
Physical Therapy Treatment    Patient Name:  Tara Mondragon   MRN:  [id number]    Recommendations:     Discharge Recommendations:  home(out patient cardiopulmonary rehab)   Discharge Equipment Recommendations: none   Barriers to discharge: None    Assessment:     Tara Mondragon is a 72 y.o. female admitted with a medical diagnosis of Acute on chronic diastolic heart failure.  She presents with the following impairments/functional limitations:  impaired endurance, impaired cardiopulmonary response to activity, impaired functional mobilty, impaired balance . Patient with increase SOB with min exertion today. O2 sat sitting EOB on RW ~95%, with ambulation drop to 87% on RA, with O2 at ~ 2 liters O2 sat 92%..    Rehab Prognosis:  good; patient would benefit from acute skilled PT services to address these deficits and reach maximum level of function.      Recent Surgery: * No surgery found *      Plan:     During this hospitalization, patient to be seen 6 x/week to address the above listed problems via gait training  · Plan of Care Expires:  12/24/18   Plan of Care Reviewed with: patient    Subjective     Communicated with primary nurse  prior to session.  Patient found supine  upon PT entry to room, agreeable to treatment.      Chief Complaint: wheezing and SOB  Patient comments/goals: go home  Pain/Comfort:  ·      Patients cultural, spiritual, Judaism conflicts given the current situation:      Objective:     Patient found with: telemetry, oxygen     General Precautions: Standard,     Orthopedic Precautions:N/A   Braces:       Functional Mobility:  · Transfers:     · Sit to Stand:  supervision and stand by assistance with no AD  · Gait: 12 ft with SBA to CG SOB noted  · Balance: fair to poor + without AD      AM-PAC 6 CLICK MOBILITY          Therapeutic Activities and Exercises:   na    Patient left HOB elevated with call button in reach and friend present..    GOALS:   Multidisciplinary Problems     Physical Therapy  Goals        Problem: Physical Therapy Goal    Goal Priority Disciplines Outcome Goal Variances Interventions   Physical Therapy Goal     PT, PT/OT Ongoing (interventions implemented as appropriate)     Description:  Goals to be met by: 2018     Patient will increase functional independence with mobility by performin. Gait  x >200 feet with Modified [place] using no AD.                       Time Tracking:     PT Received On: 18  PT Start Time: 1350     PT Stop Time: 1405  PT Total Time (min): 15 min     Billable Minutes: Gait Training 15    Treatment Type: Other (see comments)(Evaluation)  PT/PTA: PT           Jose A Vences, PT  2018

## 2018-11-26 NOTE — CONSULTS
[facility]  Critical Care - Medicine  History & Physical    Patient Name: Tara Mondragon  MRN: [id number]  Admission Date: 11/23/2018  Hospital Length of Stay: 0 days  Code Status: Full Code  Attending Physician: William Quiroz MD   Primary Care Provider: Jaun Mata MD   Principal Problem: Acute on chronic diastolic heart failure    Subjective:     HPI:  Mrs. Mondragon is a 72 year old female with the past medical history as detailed below, most notable for CHFpEF (mild aortic valve stenosis, mod mitral regurg, moderate left atrial enlargement, mild right atrial enlargement), s/p bioprosthetic mitral valve placement, CAD, HTN, Hyperthyroidism, PAD, who presents to the hospital with acute shortness of breath.  She was admitted to the cardiology service for concern of CHF decompensation.  Pulmonary was consulted for concern for symptoms out of proportion to CHF exacerbation.    Patient has a 50 year history of smoking 1 PPD.  She has no known lung disease, and quit smoking about 6 years ago.  She has no lung disease in her family.  She has no occupational exposure history.  She does not have pets.  She has no symptoms of aspiration in her history.  She does have snoring at night and has no knowledge of a DOV diagnosis.      She states that her symptoms began last October where she was noted to be short of breath at a wedding.  Since then she is now on her second hospitalization for suspected CHF exacerbation.  On admission this time she had a pro-BNP of 296, and a chest x-ray suggestive of over pulmonary edema vs infection.  She denies fever, chills, nausea, vomiting, worsening cough, sputum production or sick contacts.  Troponin was normal and d-dimer was elevated.    She only took spironolactone before this hospitalization and now is being treated with lasix.    Hospital/ICU Course:   Patient has been diuresed, and appears to be improving from a volume standpoint.  She still has evidence of overt  volume overload on chest film.  She is requiring 2L via NC and continues to have HERNANDES.  She is scheduled for PFTs in the AM.     Past Medical History:   Diagnosis Date    Acute coronary syndrome     Cardiomyopathy     Coronary artery disease     Hypertension     Hyperthyroidism     Osteoporosis     PAD (peripheral artery disease)     Valvular regurgitation     Mild/Mod MR post MI       Past Surgical History:   Procedure Laterality Date    CARDIAC VALVE SURGERY      porcine MVR    CHOLECYSTECTOMY      CORONARY ANGIOPLASTY      CORONARY ARTERY BYPASS GRAFT      CORONARY ARTERY BYPASS GRAFT (CABG) N/A 2013    [facility] 2ND FLR    REPAIR VALVE-MITRAL N/A 2013    Performed by [facility] at [facility] OR 2ND FLR    TONSILLECTOMY         Review of patient's allergies indicates:   Allergen Reactions    Plavix [clopidogrel] Rash       Family History     Problem Relation (Age of Onset)    Asthma Daughter    Heart failure Mother    Hypertension Sister, Brother    Hypothyroidism Mother, Sister    Liver disease Father        Tobacco Use    Smoking status: Former Smoker     Packs/day: 1.00     Years: 50.00     Pack years: 50.00     Last attempt to quit: 2/3/2013     Years since quittin.8    Smokeless tobacco: Never Used   Substance and Sexual Activity    Alcohol use: No    Drug use: No    Sexual activity: Not on file      Review of Systems  Objective:     Vital Signs (Most Recent):  Temp: 98.3 °F (36.8 °C) (18 1601)  Pulse: 99 (18 1601)  Resp: 19 (18 1601)  BP: 113/60 (18 1601)  SpO2: 97 % (18 1324) Vital Signs (24h Range):  Temp:  [97.5 °F (36.4 °C)-98.4 °F (36.9 °C)] 98.3 °F (36.8 °C)  Pulse:  [] 99  Resp:  [16-22] 19  SpO2:  [92 %-97 %] 97 %  BP: (104-148)/(54-74) 113/60     Weight: 85.6 kg (188 lb 11.4 oz)  Body mass index is 30.46 kg/m².      Intake/Output Summary (Last 24 hours) at 2018 4847  Last data  filed at 11/26/2018 1500  Gross per 24 hour   Intake 680 ml   Output 1780 ml   Net -1100 ml       Physical Exam  GEN: well developed, well nourished, NAD, in chair on 2L via NC  HEENT: NC/AT, MMM, PERRL, EOMI  NECK: no JVD observed  RESP: air entry decreased bilaterally at bases, no wheezing, no accessory muscle use  CARDS: very harsh, blowing pan-systolic murmur present in all cardiac fields, regular rate, regular rhythm, no R/G, no edema.  ABD: soft, non-tender, obese, NABSx4  MUSC: ROM intact, no obvious deformity of extremities  NEURO: no deficit noted.       Lines/Drains/Airways     Peripheral Intravenous Line                 Peripheral IV - Single Lumen 11/23/18 0632 Right Antecubital 3 days                Significant Labs:    CBC/Anemia Profile:  Recent Labs   Lab 11/25/18  0852   WBC 11.89   HGB 13.0   HCT 37.6      MCV 88   RDW 12.4        Chemistries:  Recent Labs   Lab 11/25/18  0353 11/26/18  0404   * 130*   K 2.9* 4.1   CL 92* 91*   CO2 29 29   BUN 23 24*   CREATININE 0.9 1.0   CALCIUM 9.2 9.2   MG 1.7 2.0       All pertinent labs within the past 24 hours have been reviewed.    Significant Imaging: I have reviewed all pertinent imaging results/findings within the past 24 hours.    Assessment/Plan:     Active Diagnoses:    Diagnosis Date Noted POA    PRINCIPAL PROBLEM:  Acute on chronic diastolic heart failure [I50.33] 11/23/2018 Yes    Congestive heart failure [I50.9] 11/26/2018 Yes    Shortness of breath [R06.02] 11/23/2018 Yes    Hypertension [I10]  Yes    S/P MVR (mitral valve replacement) [Z95.2] 02/27/2013 Not Applicable      Problems Resolved During this Admission:       1) Shortness of breath with Hypoxemic respiratory failure  - still appears to be volume overloaded on chest x-ray with right pleural effusion and diffuse pulmonary edema.  Agree to transitioning to home lasix dose in future.  - patient was educated regarding daily weights, diet, salt intake, and potential need to  take an extra lasix if weight increases despite these measures.  - unlikely to be suffering from a pneumonia given lack of constitutional symptoms.  - symptoms could possibly be secondary to undiagnosed COPD given extensive smoking history. Agree with need for PFTs, however they would be more helpful when she is fully compensated from a volume standpoint.  Her symptoms are not consistent with current COPD exacerbation.  - possibly secondary to chronic PE given reduced mobility.  In setting of normal renal function and elevated D-Dimer, we would recommend CTA thorax.  This will also give us a chance to evaluate lung parenchyma for possible interstitial disease.     - recommend inpatient 6MWT with PT and discussion of home O2 with case management and social work.  - continue scheduled duonebs for now.    Thank you for involving us in the care of this patient.  We will continue to follow.    Arnulfo Summers MD  Critical Care - Medicine  [facility]  [phone]

## 2018-11-26 NOTE — TELEPHONE ENCOUNTER
----- Message from Marla Medrano RN sent at 11/26/2018  9:55 AM CST -----  Please schedule hospital discharge followup in 7-10 days week. Patient prefers early am appointment so she can go to work. Please place in [facility]. Patient should be discharging from [facility] in 1-2 days.    Thanks,  Marla Medrano, RN, [facility], CMSRN  RN Transition Navigator  [phone]

## 2018-11-27 LAB
ANION GAP SERPL CALC-SCNC: 10 MMOL/L
BUN SERPL-MCNC: 29 MG/DL
CALCIUM SERPL-MCNC: 9.7 MG/DL
CHLORIDE SERPL-SCNC: 90 MMOL/L
CO2 SERPL-SCNC: 31 MMOL/L
CREAT SERPL-MCNC: 1.1 MG/DL
EST. GFR  (AFRICAN AMERICAN): 57 ML/MIN/1.73 M^2
EST. GFR  (NON AFRICAN AMERICAN): 50 ML/MIN/1.73 M^2
GLUCOSE SERPL-MCNC: 134 MG/DL
MAGNESIUM SERPL-MCNC: 2.1 MG/DL
POTASSIUM SERPL-SCNC: 4 MMOL/L
SODIUM SERPL-SCNC: 131 MMOL/L

## 2018-11-27 PROCEDURE — 25000003 PHARM REV CODE 250: Performed by: NURSE PRACTITIONER

## 2018-11-27 PROCEDURE — 11000001 HC ACUTE MED/SURG PRIVATE ROOM

## 2018-11-27 PROCEDURE — 63600175 PHARM REV CODE 636 W HCPCS: Performed by: NURSE PRACTITIONER

## 2018-11-27 PROCEDURE — 27000221 HC OXYGEN, UP TO 24 HOURS

## 2018-11-27 PROCEDURE — 94010 BREATHING CAPACITY TEST: CPT

## 2018-11-27 PROCEDURE — 97116 GAIT TRAINING THERAPY: CPT

## 2018-11-27 PROCEDURE — 25000242 PHARM REV CODE 250 ALT 637 W/ HCPCS: Performed by: STUDENT IN AN ORGANIZED HEALTH CARE EDUCATION/TRAINING PROGRAM

## 2018-11-27 PROCEDURE — 99900035 HC TECH TIME PER 15 MIN (STAT)

## 2018-11-27 PROCEDURE — 83735 ASSAY OF MAGNESIUM: CPT

## 2018-11-27 PROCEDURE — 94761 N-INVAS EAR/PLS OXIMETRY MLT: CPT

## 2018-11-27 PROCEDURE — 94727 GAS DIL/WSHOT DETER LNG VOL: CPT

## 2018-11-27 PROCEDURE — 94640 AIRWAY INHALATION TREATMENT: CPT

## 2018-11-27 PROCEDURE — 99232 SBSQ HOSP IP/OBS MODERATE 35: CPT | Mod: ,,, | Performed by: STUDENT IN AN ORGANIZED HEALTH CARE EDUCATION/TRAINING PROGRAM

## 2018-11-27 PROCEDURE — 80048 BASIC METABOLIC PNL TOTAL CA: CPT

## 2018-11-27 PROCEDURE — 36415 COLL VENOUS BLD VENIPUNCTURE: CPT

## 2018-11-27 PROCEDURE — 25000003 PHARM REV CODE 250: Performed by: STUDENT IN AN ORGANIZED HEALTH CARE EDUCATION/TRAINING PROGRAM

## 2018-11-27 PROCEDURE — 25500020 PHARM REV CODE 255: Performed by: STUDENT IN AN ORGANIZED HEALTH CARE EDUCATION/TRAINING PROGRAM

## 2018-11-27 PROCEDURE — 94729 DIFFUSING CAPACITY: CPT

## 2018-11-27 RX ADMIN — ENOXAPARIN SODIUM 40 MG: 100 INJECTION SUBCUTANEOUS at 06:11

## 2018-11-27 RX ADMIN — ATORVASTATIN CALCIUM 40 MG: 40 TABLET, FILM COATED ORAL at 08:11

## 2018-11-27 RX ADMIN — POTASSIUM CHLORIDE 40 MEQ: 20 TABLET, EXTENDED RELEASE ORAL at 08:11

## 2018-11-27 RX ADMIN — METOPROLOL TARTRATE 50 MG: 50 TABLET ORAL at 08:11

## 2018-11-27 RX ADMIN — SPIRONOLACTONE 25 MG: 25 TABLET ORAL at 08:11

## 2018-11-27 RX ADMIN — IOHEXOL 100 ML: 350 INJECTION, SOLUTION INTRAVENOUS at 04:11

## 2018-11-27 RX ADMIN — IPRATROPIUM BROMIDE AND ALBUTEROL SULFATE 3 ML: .5; 3 SOLUTION RESPIRATORY (INHALATION) at 08:11

## 2018-11-27 RX ADMIN — ASPIRIN 81 MG: 81 TABLET, COATED ORAL at 08:11

## 2018-11-27 RX ADMIN — IPRATROPIUM BROMIDE AND ALBUTEROL SULFATE 3 ML: .5; 3 SOLUTION RESPIRATORY (INHALATION) at 01:11

## 2018-11-27 RX ADMIN — FUROSEMIDE 80 MG: 10 INJECTION, SOLUTION INTRAMUSCULAR; INTRAVENOUS at 06:11

## 2018-11-27 RX ADMIN — FUROSEMIDE 80 MG: 10 INJECTION, SOLUTION INTRAMUSCULAR; INTRAVENOUS at 10:11

## 2018-11-27 RX ADMIN — PANTOPRAZOLE SODIUM 40 MG: 40 TABLET, DELAYED RELEASE ORAL at 08:11

## 2018-11-27 RX ADMIN — AMLODIPINE BESYLATE 5 MG: 5 TABLET ORAL at 08:11

## 2018-11-27 NOTE — PROGRESS NOTES
[facility]  Cardiology  Progress Note    Patient Name: Tara Mondragon  MRN: [id number]  Admission Date: 11/23/2018  Hospital Length of Stay: 1 days  Code Status: Full Code   Attending Physician: William Quiroz MD   Primary Care Physician: Jaun Mata MD  Expected Discharge Date:   Principal Problem:Acute on chronic diastolic heart failure    Subjective:     Hospital Course:   11/23/2018 Presented to the ER with complaints of SOB with progressive worsening.  slightly higher than previous reading at 141. Troponin .010. CXR  suggestive of pulmonary edema/CHF with possible small right pleural effusion worse than previous CXR. BMP and CBC WNL. Given Lasix 80mg IV in the ER with 250cc out so far. Will admit to observation under care of [facility] for ADHF related to dietary indiscretion of salt intake  11/24/2018 Pt had subjective dyspnea with talking. Pt only diuresis about 2 L of fluid, net neg only 1 liter. Renal function is stable.  No ovenight issues.  11/25/2018 Pt had HERNANDES, with hypoxia with ambulating down the fonseca, O2 sat > 86. Pt has wheezes on exam. UOP ~ 2L, net neg 1.5.  Chest pain free  11/26/2018 Remains on IV Lasix BID with 1.7 liters out overnight and negative 3.4 liters since admission. SOB with ambulating to bathroom this AM. Will repeat ambulatory pulse ox. Proceed with PFTs today as well as CXR PA& lat. HR and BP stable   11/27/2018 Remains on IV Lasix BID with dose of IVPB Diuril given yesterday. 2.0 liters out overnight and negative 4.6 liters since admission. HR and BP stable overnight. Patient reports SOB improved. Pulmonary consulted yesterday with recs reviewed. PFTs this AM. CTA chest as well today. Will continue IV Lasix BID for now.         Review of Systems   Constitution: Negative for chills, decreased appetite, diaphoresis, fever and weakness.   Cardiovascular: Positive for dyspnea on exertion. Negative for chest pain, claudication, cyanosis, irregular  heartbeat, leg swelling, near-syncope, orthopnea, palpitations, paroxysmal nocturnal dyspnea and syncope.   Respiratory: Negative for cough, hemoptysis, shortness of breath and wheezing.    Gastrointestinal: Negative for bloating, abdominal pain, constipation, diarrhea, melena, nausea and vomiting.   Neurological: Negative for dizziness.     Objective:     Vital Signs (Most Recent):  Temp: 97.7 °F (36.5 °C) (11/27/18 0745)  Pulse: 98 (11/27/18 0800)  Resp: 17 (11/27/18 0745)  BP: 116/66 (11/27/18 0745)  SpO2: (!) 93 % (11/27/18 0854) Vital Signs (24h Range):  Temp:  [96.4 °F (35.8 °C)-98.3 °F (36.8 °C)] 97.7 °F (36.5 °C)  Pulse:  [] 98  Resp:  [17-22] 17  SpO2:  [93 %-97 %] 93 %  BP: (104-130)/(59-74) 116/66     Weight: 85.6 kg (188 lb 11.4 oz)  Body mass index is 30.46 kg/m².     SpO2: (!) 93 %  O2 Device (Oxygen Therapy): nasal cannula      Intake/Output Summary (Last 24 hours) at 11/27/2018 1108  Last data filed at 11/27/2018 0600  Gross per 24 hour   Intake 570 ml   Output 1750 ml   Net -1180 ml       Lines/Drains/Airways     Peripheral Intravenous Line                 Peripheral IV - Single Lumen 11/27/18 1030 Anterior;Left Forearm less than 1 day                Physical Exam   Constitutional: She is oriented to person, place, and time. She appears well-developed and well-nourished. No distress.   Cardiovascular: Normal rate and regular rhythm. Exam reveals no gallop.   No murmur heard.  Pulmonary/Chest: Effort normal and breath sounds normal. No respiratory distress. She has no wheezes.   Abdominal: Soft. Bowel sounds are normal. She exhibits no distension. There is no tenderness.   Neurological: She is alert and oriented to person, place, and time.   Skin: Skin is warm and dry.       Significant Labs:     No results for input(s): WBC, RBC, HGB, HCT, PLT, MCV, MCH, MCHC in the last 24 hours.   Recent Labs   Lab 11/27/18  0349   CALCIUM 9.7   *   K 4.0   CO2 31*   CL 90*   BUN 29*   CREATININE 1.1        Significant Imaging: Echocardiogram:   Transthoracic echo (TTE) complete (Cupid Only):   Results for orders placed or performed during the hospital encounter of 11/17/18   Transthoracic echo (TTE) complete (Cupid Only)   Result Value Ref Range    BSA 1.95 m2    LA WIDTH 3.80 cm    LVIDD 4.45 3.5 - 6.0 cm    IVS 1.19 (A) 0.6 - 1.1 cm    PW 1.25 (A) 0.6 - 1.1 cm    Ao root annulus 2.67 cm    LVIDS 2.38 2.1 - 4.0 cm    FS 47 28 - 44 %    LA volume 70.05 cm3    LV mass 199.42 g    LA size 4.49 cm    RVDD 2.59 cm    Left Ventricle Relative Wall Thickness 0.56 cm    AV mean gradient 27 mmHg    AV valve area 1.13 cm2    E/A ratio 1.28     E wave decelartion time 220.46 msec    LVOT diameter 1.90 cm    LVOT area 2.83 cm2    LVOT peak VTI 29.83 cm    Ao peak tristan 2.83 m/s    Ao VTI 75.00 cm    LVOT stroke volume 84.53 cm3    AV peak gradient 32.04 mmHg    MV Peak E Tristan 2.16 m/s    TR Max Tristan 3.72 m/s    MV Peak A Tristan 1.69 m/s    LV Systolic Volume 19.69 mL    LV Systolic Volume Index 10.1 mL/m2    LV Diastolic Volume 90.13 mL    LV Diastolic Volume Index 46.22 mL/m2    LA Volume Index 35.9 mL/m2    LV Mass Index 102.3 g/m2    RA Major Axis 3.93 cm    Left Atrium Minor Axis 4.83 cm    Left Atrium Major Axis 4.83 cm    Triscuspid Valve Regurgitation Peak Gradient 55.35 mmHg    Right Atrial Pressure (from IVC) 3 mmHg    MV mean gradient 8 mmHg    MV valve area p 1/2 method 2.97 cm2    TV rest pulmonary artery pressure 58.35 mmHg    MV stenosis pressure 1/2 time 74 ms     Assessment and Plan:     Brief HPI: Seen this morning on AM NP rounds with  at the bedside. Discussed POC as detailed below-both verbalized understanding and agree with POC     * Acute on chronic diastolic heart failure    -recent TTE in 11/2018 with normal LVEF and indeterminate diastolic dysfunction; previous echo in 2013 with diastolic dysfunction  -felt to be related to dietary indiscretion of salt intake  -volume overloaded on exam and  pulmonary edema on CXR upon admission; repeat CXR yesterday with no significant improvement in pulmonary edema; continued on IV Lasix BID with IV Diuril given; 2.0 liters out overnight and negative 4.6 liters since admission;   -patient reports SOB improved but does appear SOB on exam although not as significant as yesterday  -Pulmonary consulted and appreciate recs; PFTs this AM as well as CTA   -will continue IV Lasix BID    -continue Norvasc, Metoprolol and Aldactone     Shortness of breath    -orginally felt to be related to ADHF  -aggressive diuresis in process  -proceed with PFTs for evaluation of pulmonary etiology as source of SOB      Hypertension    -SBP 100s-120s overnight  -stable upon presentation   -continue CCB, BB and Aldactone      S/P MVR (mitral valve replacement)    -porcine MVR 2/2013  -recent TTE with well seated valve; LINDA earlier this week for further evaluation of mitral valve with moderate MR          VTE Risk Mitigation (From admission, onward)        Ordered     enoxaparin injection 40 mg  Daily      11/23/18 1210     IP VTE HIGH RISK PATIENT  Once      11/23/18 1210          RHONA De Leon, ANP  Cardiology  [facility]

## 2018-11-27 NOTE — PROGRESS NOTES
[facility]  Critical Care - Medicine  History & Physical    Patient Name: Tara Mondragon  MRN: [id number]  Admission Date: 11/23/2018  Hospital Length of Stay: 1 days  Code Status: Full Code  Attending Physician: William Quiroz MD   Primary Care Provider: Jaun Mata MD   Principal Problem: Acute on chronic diastolic heart failure    Subjective:     HPI:  Mrs. Mondragon is a 72 year old female with the past medical history as detailed below, most notable for CHFpEF (mild aortic valve stenosis, mod mitral regurg, moderate left atrial enlargement, mild right atrial enlargement), s/p bioprosthetic mitral valve placement, CAD, HTN, Hyperthyroidism, PAD, who presents to the hospital with acute shortness of breath.  She was admitted to the cardiology service for concern of CHF decompensation.  Pulmonary was consulted for concern for symptoms out of proportion to CHF exacerbation.    Patient has a 50 year history of smoking 1 PPD.  She has no known lung disease, and quit smoking about 6 years ago.  She has no lung disease in her family.  She has no occupational exposure history.  She does not have pets.  She has no symptoms of aspiration in her history.  She does have snoring at night and has no knowledge of a DOV diagnosis.      She states that her symptoms began last October where she was noted to be short of breath at a wedding.  Since then she is now on her second hospitalization for suspected CHF exacerbation.  On admission this time she had a pro-BNP of 296, and a chest x-ray suggestive of over pulmonary edema vs infection.  She denies fever, chills, nausea, vomiting, worsening cough, sputum production or sick contacts.  Troponin was normal and d-dimer was elevated.    She only took spironolactone before this hospitalization and now is being treated with lasix.    Hospital/ICU Course:   Patient doing well this AM and is seen sitting on side of bed without O2 on.  She underwent PFTs this AM and results  did not show obstructive lung disease.  They did show a reduction in DLCO and evidence of restrictive process; however this is likely secondary to fluid overload (decreasing compliance of lung parenchyma) and the test should be repeated when volume status is optimized, chest x-ray clears, and pulmonary function nears its baseline.   She is pending Her CTA.  She has no other complaints at this time.     Past Medical History:   Diagnosis Date    Acute coronary syndrome     Cardiomyopathy     Coronary artery disease     Hypertension     Hyperthyroidism     Osteoporosis     PAD (peripheral artery disease)     Valvular regurgitation     Mild/Mod MR post MI       Past Surgical History:   Procedure Laterality Date    CARDIAC VALVE SURGERY      porcine MVR    CHOLECYSTECTOMY      CORONARY ANGIOPLASTY      CORONARY ARTERY BYPASS GRAFT      CORONARY ARTERY BYPASS GRAFT (CABG) N/A 2013    [facility] 2ND FLR    REPAIR VALVE-MITRAL N/A 2013    [facility]formed by [facility] at [facility] OR 2ND FLR    TONSILLECTOMY         Review of patient's allergies indicates:   Allergen Reactions    Plavix [clopidogrel] Rash       Family History     Problem Relation (Age of Onset)    Asthma Daughter    Heart failure Mother    Hypertension Sister, Brother    Hypothyroidism Mother, Sister    Liver disease Father        Tobacco Use    Smoking status: Former Smoker     Packs/day: 1.00     Years: 50.00     Pack years: 50.00     Last attempt to quit: 2/3/2013     Years since quittin.8    Smokeless tobacco: Never Used   Substance and Sexual Activity    Alcohol use: No    Drug use: No    Sexual activity: Not on file      Review of Systems  Objective:     Vital Signs (Most Recent):  Temp: 96.6 °F (35.9 °C) (18 1200)  Pulse: 88 (18 1312)  Resp: 18 (18 1312)  BP: 116/67 (18 1200)  SpO2: (!) 94 % (18 1312) Vital Signs (24h Range):  Temp:  [96.4 °F (35.8  °C)-98.3 °F (36.8 °C)] 96.6 °F (35.9 °C)  Pulse:  [] 88  Resp:  [17-20] 18  SpO2:  [93 %-95 %] 94 %  BP: (104-126)/(59-68) 116/67     Weight: 85.6 kg (188 lb 11.4 oz)  Body mass index is 30.46 kg/m².      Intake/Output Summary (Last 24 hours) at 11/27/2018 1344  Last data filed at 11/27/2018 1239  Gross per 24 hour   Intake 570 ml   Output 2450 ml   Net -1880 ml       Physical Exam  GEN: well developed, well nourished, NAD, in chair on 2L via NC  HEENT: NC/AT, MMM, PERRL, EOMI  NECK: no JVD observed  RESP: air entry decreased bilaterally at bases, no wheezing, no accessory muscle use  CARDS: very harsh, blowing pan-systolic murmur present in all cardiac fields, regular rate, regular rhythm, no R/G, no edema.  ABD: soft, non-tender, obese, NABSx4  MUSC: ROM intact, no obvious deformity of extremities  NEURO: no deficit noted.       Lines/Drains/Airways     Peripheral Intravenous Line                 Peripheral IV - Single Lumen 11/27/18 1030 Anterior;Left Forearm less than 1 day                Significant Labs:    CBC/Anemia Profile:  No results for input(s): WBC, HGB, HCT, PLT, MCV, RDW, IRON, FERRITIN, RETIC, FOLATE, [id number], OCCULTBLOOD in the last 48 hours.     Chemistries:  Recent Labs   Lab 11/26/18  0404 11/27/18  0349   * 131*   K 4.1 4.0   CL 91* 90*   CO2 29 31*   BUN 24* 29*   CREATININE 1.0 1.1   CALCIUM 9.2 9.7   MG 2.0 2.1       All pertinent labs within the past 24 hours have been reviewed.    Significant Imaging: I have reviewed all pertinent imaging results/findings within the past 24 hours.    Assessment/Plan:     Active Diagnoses:    Diagnosis Date Noted POA    PRINCIPAL PROBLEM:  Acute on chronic diastolic heart failure [I50.33] 11/23/2018 Yes    Congestive heart failure [I50.9] 11/26/2018 Yes    Shortness of breath [R06.02] 11/23/2018 Yes    Hypertension [I10]  Yes    S/P MVR (mitral valve replacement) [Z95.2] 02/27/2013 Not Applicable      Problems Resolved During this  Admission:       1) Shortness of breath with Hypoxemic respiratory failure  - repeat chest x-ray with concern for worsening pulmonary edema and persistent small right pleural effusion.  Agree to continue aggressive diuresis and discharging with home diuretic.  - PFTs negative for obstructive disease.  She did have reduction in DLCO and restriction, however this may be seen with pulmonary edema.  Exam should be repeated when euvolemic and lung function is at baseline to evaluate further.  - CTA thorax ordered and pending for PE eval.  This will also give us a chance to evaluate lung parenchyma for possible interstitial disease.     - recommend inpatient 6MWT with PT and discussion of home O2 with case management and social work.  - continue scheduled duonebs for now.    Thank you for involving us in the care of this patient.  We will continue to follow.    Arnulfo Summers MD  Critical Care - Medicine  [facility]  [phone]

## 2018-11-27 NOTE — PLAN OF CARE
Problem: Physical Therapy Goal  Goal: Physical Therapy Goal  Goals to be met by: 2018     Patient will increase functional independence with mobility by performin. Gait  x >200 feet with Modified [place] using no AD.      Outcome: Ongoing (interventions implemented as appropriate)  Home with out patient cardiopulmonary rehab

## 2018-11-27 NOTE — ASSESSMENT & PLAN NOTE
-recent TTE in 11/2018 with normal LVEF and indeterminate diastolic dysfunction; previous echo in 2013 with diastolic dysfunction  -felt to be related to dietary indiscretion of salt intake  -volume overloaded on exam and pulmonary edema on CXR upon admission; repeat CXR yesterday with no significant improvement in pulmonary edema; continued on IV Lasix BID with IV Diuril given; 2.0 liters out overnight and negative 4.6 liters since admission;   -patient reports SOB improved but does appear SOB on exam although not as significant as yesterday  -Pulmonary consulted and appreciate recs; PFTs this AM as well as CTA   -will continue IV Lasix BID    -continue Norvasc, Metoprolol and Aldactone

## 2018-11-27 NOTE — SUBJECTIVE & OBJECTIVE
Review of Systems   Constitution: Negative for chills, decreased appetite, diaphoresis, fever and weakness.   Cardiovascular: Positive for dyspnea on exertion. Negative for chest pain, claudication, cyanosis, irregular heartbeat, leg swelling, near-syncope, orthopnea, palpitations, paroxysmal nocturnal dyspnea and syncope.   Respiratory: Negative for cough, hemoptysis, shortness of breath and wheezing.    Gastrointestinal: Negative for bloating, abdominal pain, constipation, diarrhea, melena, nausea and vomiting.   Neurological: Negative for dizziness.     Objective:     Vital Signs (Most Recent):  Temp: 97.7 °F (36.5 °C) (11/27/18 0745)  Pulse: 98 (11/27/18 0800)  Resp: 17 (11/27/18 0745)  BP: 116/66 (11/27/18 0745)  SpO2: (!) 93 % (11/27/18 0854) Vital Signs (24h Range):  Temp:  [96.4 °F (35.8 °C)-98.3 °F (36.8 °C)] 97.7 °F (36.5 °C)  Pulse:  [] 98  Resp:  [17-22] 17  SpO2:  [93 %-97 %] 93 %  BP: (104-130)/(59-74) 116/66     Weight: 85.6 kg (188 lb 11.4 oz)  Body mass index is 30.46 kg/m².     SpO2: (!) 93 %  O2 Device (Oxygen Therapy): nasal cannula      Intake/Output Summary (Last 24 hours) at 11/27/2018 1108  Last data filed at 11/27/2018 0600  Gross per 24 hour   Intake 570 ml   Output 1750 ml   Net -1180 ml       Lines/Drains/Airways     Peripheral Intravenous Line                 Peripheral IV - Single Lumen 11/27/18 1030 Anterior;Left Forearm less than 1 day                Physical Exam   Constitutional: She is oriented to person, place, and time. She appears well-developed and well-nourished. No distress.   Cardiovascular: Normal rate and regular rhythm. Exam reveals no gallop.   No murmur heard.  Pulmonary/Chest: Effort normal and breath sounds normal. No respiratory distress. She has no wheezes.   Abdominal: Soft. Bowel sounds are normal. She exhibits no distension. There is no tenderness.   Neurological: She is alert and oriented to person, place, and time.   Skin: Skin is warm and dry.        Significant Labs:     No results for input(s): WBC, RBC, HGB, HCT, PLT, MCV, MCH, MCHC in the last 24 hours.   Recent Labs   Lab 11/27/18  0349   CALCIUM 9.7   *   K 4.0   CO2 31*   CL 90*   BUN 29*   CREATININE 1.1       Significant Imaging: Echocardiogram:   Transthoracic echo (TTE) complete (Cupid Only):   Results for orders placed or performed during the hospital encounter of 11/17/18   Transthoracic echo (TTE) complete (Cupid Only)   Result Value Ref Range    BSA 1.95 m2    LA WIDTH 3.80 cm    LVIDD 4.45 3.5 - 6.0 cm    IVS 1.19 (A) 0.6 - 1.1 cm    PW 1.25 (A) 0.6 - 1.1 cm    Ao root annulus 2.67 cm    LVIDS 2.38 2.1 - 4.0 cm    FS 47 28 - 44 %    LA volume 70.05 cm3    LV mass 199.42 g    LA size 4.49 cm    RVDD 2.59 cm    Left Ventricle Relative Wall Thickness 0.56 cm    AV mean gradient 27 mmHg    AV valve area 1.13 cm2    E/A ratio 1.28     E wave decelartion time 220.46 msec    LVOT diameter 1.90 cm    LVOT area 2.83 cm2    LVOT peak VTI 29.83 cm    Ao peak tristan 2.83 m/s    Ao VTI 75.00 cm    LVOT stroke volume 84.53 cm3    AV peak gradient 32.04 mmHg    MV Peak E Tristan 2.16 m/s    TR Max Tristan 3.72 m/s    MV Peak A Tristan 1.69 m/s    LV Systolic Volume 19.69 mL    LV Systolic Volume Index 10.1 mL/m2    LV Diastolic Volume 90.13 mL    LV Diastolic Volume Index 46.22 mL/m2    LA Volume Index 35.9 mL/m2    LV Mass Index 102.3 g/m2    RA Major Axis 3.93 cm    Left Atrium Minor Axis 4.83 cm    Left Atrium Major Axis 4.83 cm    Triscuspid Valve Regurgitation Peak Gradient 55.35 mmHg    Right Atrial Pressure (from IVC) 3 mmHg    MV mean gradient 8 mmHg    MV valve area p 1/2 method 2.97 cm2    TV rest pulmonary artery pressure 58.35 mmHg    MV stenosis pressure 1/2 time 74 ms

## 2018-11-27 NOTE — PLAN OF CARE
Problem: Patient Care Overview  Goal: Plan of Care Review  Outcome: Revised  NAD noted. PT AAOX4.Plan of care reviewed with PT. PT verbalizes understanding. Fall risk education provided. PT verbalizes understanding. PT remains on tele NSR with no red alarms noted. Bed alarm on, bed in lowest position, wheels locked. Will continue to monitor.

## 2018-11-27 NOTE — PT/OT/SLP PROGRESS
Physical Therapy Treatment    Patient Name:  Tara Mondragon   MRN:  [id number]    Recommendations:     Discharge Recommendations:  home, other (see comments)(cardiopulmonary rehab)   Discharge Equipment Recommendations: none   Barriers to discharge: None    Assessment:     Tara Mondragon is a 72 y.o. female admitted with a medical diagnosis of Acute on chronic diastolic heart failure.  She presents with the following impairments/functional limitations:  impaired cardiopulmonary response to activity, impaired endurance, impaired balance, impaired self care skills, impaired functional mobilty . Patient with less SOB noted. .    Rehab Prognosis:  good; patient would benefit from acute skilled PT services to address these deficits and reach maximum level of function.      Recent Surgery: * No surgery found *      Plan:     During this hospitalization, patient to be seen 6 x/week to address the above listed problems via gait training, therapeutic exercises  · Plan of Care Expires:  12/24/18   Plan of Care Reviewed with: patient    Subjective     Communicated with primary nurse prior to session.  Patient found up in chair at bedside upon PT entry to room, agreeable to treatment.      Chief Complaint: none voiced  Patient comments/goals: go home  Pain/Comfort:  · Pain Rating 1: 0/10  · Pain Rating Post-Intervention 1: 0/10    Patients cultural, spiritual, Spiritism conflicts given the current situation:      Objective:     Patient found with: telemetry, oxygen     General Precautions: Standard, fall   Orthopedic Precautions:N/A   Braces: N/A     Functional Mobility:  · Transfers:     · Sit to Stand:  contact guard assistance with no AD  · Gait: 40 ft with O2 at 3 liters no AD  · Balance: fair Needs CG with gait      AM-PAC 6 CLICK MOBILITY          Therapeutic Activities and Exercises:   na    Patient left up in chair with chair alarm on and spouse present..    GOALS:   Multidisciplinary Problems     Physical Therapy Goals         Problem: Physical Therapy Goal    Goal Priority Disciplines Outcome Goal Variances Interventions   Physical Therapy Goal     PT, PT/OT Ongoing (interventions implemented as appropriate)     Description:  Goals to be met by: 2018     Patient will increase functional independence with mobility by performin. Gait  x >200 feet with Modified [place] using no AD.                       Time Tracking:     PT Received On: 18  PT Start Time: 1430     PT Stop Time: 1445  PT Total Time (min): 15 min     Billable Minutes: Gait Training 15    Treatment Type: Treatment  PT/PTA: PT           Jose A Vences, PT  2018

## 2018-11-28 ENCOUNTER — DOCUMENTATION ONLY (OUTPATIENT)
Dept: CARDIOLOGY | Facility: CLINIC | Age: 73
End: 2018-11-28

## 2018-11-28 DIAGNOSIS — I25.10 CORONARY ARTERY DISEASE INVOLVING NATIVE CORONARY ARTERY WITHOUT ANGINA PECTORIS, UNSPECIFIED WHETHER NATIVE OR TRANSPLANTED HEART: Primary | ICD-10-CM

## 2018-11-28 LAB
ALLENS TEST: ABNORMAL
ALLENS TEST: ABNORMAL
ANION GAP SERPL CALC-SCNC: 10 MMOL/L
BUN SERPL-MCNC: 38 MG/DL
CALCIUM SERPL-MCNC: 9.7 MG/DL
CHLORIDE SERPL-SCNC: 96 MMOL/L
CO2 SERPL-SCNC: 29 MMOL/L
CREAT SERPL-MCNC: 1.2 MG/DL
EST. GFR  (AFRICAN AMERICAN): 51 ML/MIN/1.73 M^2
EST. GFR  (NON AFRICAN AMERICAN): 45 ML/MIN/1.73 M^2
GLUCOSE SERPL-MCNC: 119 MG/DL
HCO3 UR-SCNC: 28.3 MMOL/L (ref 24–28)
HCO3 UR-SCNC: 28.4 MMOL/L (ref 24–28)
MAGNESIUM SERPL-MCNC: 2.2 MG/DL
PCO2 BLDA: 41.5 MMHG (ref 35–45)
PCO2 BLDA: 43 MMHG (ref 35–45)
PH SMN: 7.43 [PH] (ref 7.35–7.45)
PH SMN: 7.44 [PH] (ref 7.35–7.45)
PO2 BLDA: 29 MMHG (ref 40–60)
PO2 BLDA: 56 MMHG (ref 80–100)
POC BE: 4 MMOL/L
POC BE: 4 MMOL/L
POC SATURATED O2: 57 % (ref 95–100)
POC SATURATED O2: 89 % (ref 95–100)
POC TCO2: 30 MMOL/L (ref 23–27)
POC TCO2: 30 MMOL/L (ref 24–29)
POTASSIUM SERPL-SCNC: 4.3 MMOL/L
SAMPLE: ABNORMAL
SAMPLE: ABNORMAL
SITE: ABNORMAL
SITE: ABNORMAL
SODIUM SERPL-SCNC: 135 MMOL/L

## 2018-11-28 PROCEDURE — C1894 INTRO/SHEATH, NON-LASER: HCPCS | Performed by: INTERNAL MEDICINE

## 2018-11-28 PROCEDURE — 25000003 PHARM REV CODE 250: Performed by: STUDENT IN AN ORGANIZED HEALTH CARE EDUCATION/TRAINING PROGRAM

## 2018-11-28 PROCEDURE — 99152 MOD SED SAME PHYS/QHP 5/>YRS: CPT | Mod: ,,, | Performed by: INTERNAL MEDICINE

## 2018-11-28 PROCEDURE — 99153 MOD SED SAME PHYS/QHP EA: CPT | Performed by: INTERNAL MEDICINE

## 2018-11-28 PROCEDURE — 63600175 PHARM REV CODE 636 W HCPCS: Performed by: NURSE PRACTITIONER

## 2018-11-28 PROCEDURE — 63600175 PHARM REV CODE 636 W HCPCS: Performed by: INTERNAL MEDICINE

## 2018-11-28 PROCEDURE — 25000003 PHARM REV CODE 250

## 2018-11-28 PROCEDURE — 4A023N8 MEASUREMENT OF CARDIAC SAMPLING AND PRESSURE, BILATERAL, PERCUTANEOUS APPROACH: ICD-10-PCS | Performed by: INTERNAL MEDICINE

## 2018-11-28 PROCEDURE — 80048 BASIC METABOLIC PNL TOTAL CA: CPT

## 2018-11-28 PROCEDURE — C1769 GUIDE WIRE: HCPCS | Performed by: INTERNAL MEDICINE

## 2018-11-28 PROCEDURE — 63600175 PHARM REV CODE 636 W HCPCS

## 2018-11-28 PROCEDURE — 25500020 PHARM REV CODE 255

## 2018-11-28 PROCEDURE — C1751 CATH, INF, PER/CENT/MIDLINE: HCPCS | Performed by: INTERNAL MEDICINE

## 2018-11-28 PROCEDURE — 27000221 HC OXYGEN, UP TO 24 HOURS

## 2018-11-28 PROCEDURE — 25500020 PHARM REV CODE 255: Performed by: INTERNAL MEDICINE

## 2018-11-28 PROCEDURE — 99152 MOD SED SAME PHYS/QHP 5/>YRS: CPT | Performed by: INTERNAL MEDICINE

## 2018-11-28 PROCEDURE — 93461 R&L HRT ART/VENTRICLE ANGIO: CPT | Mod: 26,,, | Performed by: INTERNAL MEDICINE

## 2018-11-28 PROCEDURE — 11000001 HC ACUTE MED/SURG PRIVATE ROOM

## 2018-11-28 PROCEDURE — 93458 L HRT ARTERY/VENTRICLE ANGIO: CPT | Performed by: INTERNAL MEDICINE

## 2018-11-28 PROCEDURE — B2131ZZ FLUOROSCOPY OF MULTIPLE CORONARY ARTERY BYPASS GRAFTS USING LOW OSMOLAR CONTRAST: ICD-10-PCS | Performed by: INTERNAL MEDICINE

## 2018-11-28 PROCEDURE — 36415 COLL VENOUS BLD VENIPUNCTURE: CPT

## 2018-11-28 PROCEDURE — 93461 R&L HRT ART/VENTRICLE ANGIO: CPT | Performed by: INTERNAL MEDICINE

## 2018-11-28 PROCEDURE — 83735 ASSAY OF MAGNESIUM: CPT

## 2018-11-28 PROCEDURE — 25000003 PHARM REV CODE 250: Performed by: INTERNAL MEDICINE

## 2018-11-28 PROCEDURE — 94761 N-INVAS EAR/PLS OXIMETRY MLT: CPT

## 2018-11-28 PROCEDURE — 93005 ELECTROCARDIOGRAM TRACING: CPT

## 2018-11-28 PROCEDURE — 25000242 PHARM REV CODE 250 ALT 637 W/ HCPCS: Performed by: STUDENT IN AN ORGANIZED HEALTH CARE EDUCATION/TRAINING PROGRAM

## 2018-11-28 PROCEDURE — 94640 AIRWAY INHALATION TREATMENT: CPT

## 2018-11-28 PROCEDURE — 27201423 OPTIME MED/SURG SUP & DEVICES STERILE SUPPLY: Performed by: INTERNAL MEDICINE

## 2018-11-28 PROCEDURE — B2111ZZ FLUOROSCOPY OF MULTIPLE CORONARY ARTERIES USING LOW OSMOLAR CONTRAST: ICD-10-PCS | Performed by: INTERNAL MEDICINE

## 2018-11-28 PROCEDURE — 25000003 PHARM REV CODE 250: Performed by: NURSE PRACTITIONER

## 2018-11-28 RX ORDER — VERAPAMIL HYDROCHLORIDE 2.5 MG/ML
INJECTION, SOLUTION INTRAVENOUS
Status: DISCONTINUED | OUTPATIENT
Start: 2018-11-28 | End: 2018-11-28 | Stop reason: HOSPADM

## 2018-11-28 RX ORDER — IODIXANOL 320 MG/ML
INJECTION, SOLUTION INTRAVASCULAR
Status: DISCONTINUED | OUTPATIENT
Start: 2018-11-28 | End: 2018-11-28 | Stop reason: HOSPADM

## 2018-11-28 RX ORDER — HEPARIN SODIUM 1000 [USP'U]/ML
INJECTION, SOLUTION INTRAVENOUS; SUBCUTANEOUS
Status: DISCONTINUED | OUTPATIENT
Start: 2018-11-28 | End: 2018-11-28 | Stop reason: HOSPADM

## 2018-11-28 RX ORDER — HEPARIN SODIUM 200 [USP'U]/100ML
INJECTION, SOLUTION INTRAVENOUS
Status: DISCONTINUED | OUTPATIENT
Start: 2018-11-28 | End: 2018-11-29 | Stop reason: HOSPADM

## 2018-11-28 RX ORDER — MIDAZOLAM HYDROCHLORIDE 1 MG/ML
INJECTION, SOLUTION INTRAMUSCULAR; INTRAVENOUS
Status: DISCONTINUED | OUTPATIENT
Start: 2018-11-28 | End: 2018-11-28 | Stop reason: HOSPADM

## 2018-11-28 RX ORDER — FUROSEMIDE 10 MG/ML
INJECTION INTRAMUSCULAR; INTRAVENOUS
Status: DISPENSED
Start: 2018-11-28 | End: 2018-11-28

## 2018-11-28 RX ORDER — LIDOCAINE HYDROCHLORIDE 10 MG/ML
INJECTION, SOLUTION EPIDURAL; INFILTRATION; INTRACAUDAL; PERINEURAL
Status: DISCONTINUED | OUTPATIENT
Start: 2018-11-28 | End: 2018-11-28 | Stop reason: HOSPADM

## 2018-11-28 RX ORDER — SODIUM CHLORIDE 9 MG/ML
3 INJECTION, SOLUTION INTRAVENOUS CONTINUOUS
Status: ACTIVE | OUTPATIENT
Start: 2018-11-28 | End: 2018-11-28

## 2018-11-28 RX ORDER — FENTANYL CITRATE 50 UG/ML
INJECTION, SOLUTION INTRAMUSCULAR; INTRAVENOUS
Status: DISCONTINUED | OUTPATIENT
Start: 2018-11-28 | End: 2018-11-28 | Stop reason: HOSPADM

## 2018-11-28 RX ADMIN — METOPROLOL TARTRATE 50 MG: 50 TABLET ORAL at 09:11

## 2018-11-28 RX ADMIN — ATORVASTATIN CALCIUM 40 MG: 40 TABLET, FILM COATED ORAL at 09:11

## 2018-11-28 RX ADMIN — IPRATROPIUM BROMIDE AND ALBUTEROL SULFATE 3 ML: .5; 3 SOLUTION RESPIRATORY (INHALATION) at 07:11

## 2018-11-28 RX ADMIN — AMLODIPINE BESYLATE 5 MG: 5 TABLET ORAL at 09:11

## 2018-11-28 RX ADMIN — SODIUM CHLORIDE 1.5 ML/KG/HR: 0.9 INJECTION, SOLUTION INTRAVENOUS at 05:11

## 2018-11-28 RX ADMIN — POTASSIUM CHLORIDE 40 MEQ: 20 TABLET, EXTENDED RELEASE ORAL at 09:11

## 2018-11-28 RX ADMIN — PANTOPRAZOLE SODIUM 40 MG: 40 TABLET, DELAYED RELEASE ORAL at 09:11

## 2018-11-28 RX ADMIN — SPIRONOLACTONE 25 MG: 25 TABLET ORAL at 09:11

## 2018-11-28 RX ADMIN — ENOXAPARIN SODIUM 40 MG: 100 INJECTION SUBCUTANEOUS at 06:11

## 2018-11-28 RX ADMIN — FUROSEMIDE 80 MG: 10 INJECTION, SOLUTION INTRAMUSCULAR; INTRAVENOUS at 06:11

## 2018-11-28 RX ADMIN — FUROSEMIDE 80 MG: 10 INJECTION, SOLUTION INTRAMUSCULAR; INTRAVENOUS at 09:11

## 2018-11-28 RX ADMIN — ASPIRIN 81 MG: 81 TABLET, COATED ORAL at 09:11

## 2018-11-28 NOTE — NURSING
Patient arrived to recovery cath lab. Patient alert able to follow commands with no complaints of pain. Vital signs stable, and LEFT RADIAL site is some blood under the band, fingers have slight discoloration, with +1 pulses. Vasc Band in place with 10cc of air.  Site noted to be soft with no bleeding or hematoma. Patient informed of fall risk precaution. Patient verbalized understanding. Bed locked and in lowest position, with side rails up x2. Will continue to monitor.

## 2018-11-28 NOTE — PROGRESS NOTES
.Pharmacy New Medication Education    Patient accepted medication education.    Pharmacy educated patient on name and purpose of medications and possible side effects, using the teach-back method.     Current Inpatient Medication Orders   acetaminophen tablet 650 mg   albuterol-ipratropium 2.5 mg-0.5 mg/3 mL nebulizer solution 3 mL   amLODIPine tablet 5 mg   aspirin EC tablet 81 mg   atorvastatin tablet 40 mg   enoxaparin injection 40 mg   furosemide injection 80 mg   metoprolol tartrate (LOPRESSOR) tablet 50 mg   pantoprazole EC tablet 40 mg   polyethylene glycol packet 17 g   potassium chloride SA CR tablet 40 mEq   ramelteon tablet 8 mg   senna-docusate 8.6-50 mg per tablet 2 tablet   simethicone chewable tablet 80 mg   sodium chloride 0.9% flush 3 mL   spironolactone tablet 25 mg       Learners of pharmacy medication education included:  Patient    Patient +/- learner response:  Verbalized Understanding, Teachback

## 2018-11-28 NOTE — PLAN OF CARE
Problem: Patient Care Overview  Goal: Plan of Care Review  Outcome: Ongoing (interventions implemented as appropriate)  Plan of care reviewed with patient. Patient voiced understanding. NSR on monitor, running 80s-90s. No acute distress noted. Activity encouraged. Pt sat in recliner for 3.5 hours. Side rails x 2, bed in lowest position, call bell within reach, pt advised to call for assistance. Maintain bed alarm for patient safety.

## 2018-11-28 NOTE — PROGRESS NOTES
[facility]  Critical Care - Medicine  History & Physical    Patient Name: Tara Mondragon  MRN: [id number]  Admission Date: 11/23/2018  Hospital Length of Stay: 2 days  Code Status: Full Code  Attending Physician: William Quiroz MD   Primary Care Provider: Jaun Mata MD   Principal Problem: Acute on chronic diastolic heart failure    Subjective:     HPI:  Mrs. Mondragon is a 72 year old female with the past medical history as detailed below, most notable for CHFpEF (mild aortic valve stenosis, mod mitral regurg, moderate left atrial enlargement, mild right atrial enlargement), s/p bioprosthetic mitral valve placement, CAD, HTN, Hyperthyroidism, PAD, who presents to the hospital with acute shortness of breath.  She was admitted to the cardiology service for concern of CHF decompensation.  Pulmonary was consulted for concern for symptoms out of proportion to CHF exacerbation.    Patient has a 50 year history of smoking 1 PPD.  She has no known lung disease, and quit smoking about 6 years ago.  She has no lung disease in her family.  She has no occupational exposure history.  She does not have pets.  She has no symptoms of aspiration in her history.  She does have snoring at night and has no knowledge of a DOV diagnosis.      She states that her symptoms began last October where she was noted to be short of breath at a wedding.  Since then she is now on her second hospitalization for suspected CHF exacerbation.  On admission this time she had a pro-BNP of 296, and a chest x-ray suggestive of over pulmonary edema vs infection.  She denies fever, chills, nausea, vomiting, worsening cough, sputum production or sick contacts.  Troponin was normal and d-dimer was elevated.    She only took spironolactone before this hospitalization and now is being treated with lasix.    Hospital/ICU Course:   Patient just finished receiving a bath this AM without O2 on and appeared to be comfortable.  She underwent a CTA  thorax yesterday, which showed right upper lobe interstitial disease that is stable since 2013.  It did not show any PE.  Persistent right pleural effusion present that is smaller in comparison to 2013 study. She states she is pending a cath procedure with cardiology today. She has no other complaints at this time.     Past Medical History:   Diagnosis Date    Acute coronary syndrome     Cardiomyopathy     Coronary artery disease     Hypertension     Hyperthyroidism     Osteoporosis     PAD (peripheral artery disease)     Valvular regurgitation     Mild/Mod MR post MI       Past Surgical History:   Procedure Laterality Date    CARDIAC VALVE SURGERY      porcine MVR    CHOLECYSTECTOMY      CORONARY ANGIOPLASTY      CORONARY ARTERY BYPASS GRAFT      CORONARY ARTERY BYPASS GRAFT (CABG) N/A 2013    Performed by [facility] 2ND FLR    REPAIR VALVE-MITRAL N/A 2013    Performed by [facility]ien [facility] at [facility] OR 2ND FLR    TONSILLECTOMY         Review of patient's allergies indicates:   Allergen Reactions    Plavix [clopidogrel] Rash       Family History     Problem Relation (Age of Onset)    Asthma Daughter    Heart failure Mother    Hypertension Sister, Brother    Hypothyroidism Mother, Sister    Liver disease Father        Tobacco Use    Smoking status: Former Smoker     Packs/day: 1.00     Years: 50.00     Pack years: 50.00     Last attempt to quit: 2/3/2013     Years since quittin.8    Smokeless tobacco: Never Used   Substance and Sexual Activity    Alcohol use: No    Drug use: No    Sexual activity: Not on file      Review of Systems  Objective:     Vital Signs (Most Recent):  Temp: 97.6 °F (36.4 °C) (18 1120)  Pulse: 82 (18 1200)  Resp: 18 (18 1120)  BP: (!) 94/52 (18 1120)  SpO2: (!) 92 % (18 1221) Vital Signs (24h Range):  Temp:  [96.2 °F (35.7 °C)-97.6 °F (36.4 °C)] 97.6 °F (36.4 °C)  Pulse:  [81-95] 82  Resp:  [18-20]  18  SpO2:  [92 %-95 %] 92 %  BP: ()/(52-65) 94/52     Weight: 85.6 kg (188 lb 11.4 oz)  Body mass index is 30.46 kg/m².      Intake/Output Summary (Last 24 hours) at 11/28/2018 1333  Last data filed at 11/28/2018 0830  Gross per 24 hour   Intake 125 ml   Output 1200 ml   Net -1075 ml       Physical Exam  GEN: well developed, well nourished, NAD, in chair on 2L via NC  HEENT: NC/AT, MMM, PERRL, EOMI  NECK: no JVD observed  RESP: air entry decreased bilaterally at bases, no wheezing, no accessory muscle use  CARDS: very harsh, blowing pan-systolic murmur present in all cardiac fields, regular rate, regular rhythm, no R/G, no edema.  ABD: soft, non-tender, obese, NABSx4  MUSC: ROM intact, no obvious deformity of extremities  NEURO: no deficit noted.       Lines/Drains/Airways     Peripheral Intravenous Line                 Peripheral IV - Single Lumen 11/27/18 1030 Anterior;Left Forearm 1 day         Peripheral IV - Single Lumen 11/27/18 1656 Left Upper Arm less than 1 day                Significant Labs:    CBC/Anemia Profile:  No results for input(s): WBC, HGB, HCT, PLT, MCV, RDW, IRON, FERRITIN, RETIC, FOLATE, [id number], OCCULTBLOOD in the last 48 hours.     Chemistries:  Recent Labs   Lab 11/27/18  0349 11/28/18  0529   * 135*   K 4.0 4.3   CL 90* 96   CO2 31* 29   BUN 29* 38*   CREATININE 1.1 1.2   CALCIUM 9.7 9.7   MG 2.1 2.2       All pertinent labs within the past 24 hours have been reviewed.    Significant Imaging: I have reviewed all pertinent imaging results/findings within the past 24 hours.   CTA CHEST NON CORONARY - 11/27/2018    CLINICAL HISTORY:  Chest pain, acute, PE suspected, intermed prob, positive D-dimer;    TECHNIQUE:  Low dose axial images, sagittal and coronal reformations were obtained from the thoracic inlet to the lung bases following the IV administration of 100 mL of Omnipaque 350.  Contrast timing was optimized to evaluate the pulmonary arteries.  MIP images were  performed.    COMPARISON:  12/12/2013    FINDINGS:  The airways are patent.  Upper normal size nodes noted in the bilateral hilar region.  The thoracic aorta is of normal caliber, it has atherosclerotic plaque.  There is good opacification of the pulmonary arterial system demonstrating no evidence of pulmonary embolus.  No pericardial effusion.  Coronary artery calcifications.    Emphysematous type changes involving the upper lung zones.    Right upper lobe: Diffuse ground-glass opacity with subsegmental areas of more confluent opacity, similar to 02/12/2013.    Right middle lobe: Small subsegmental area of ground-glass opacity within the lateral segment, new from the prior study.    Right lower lobe: Minimal atelectasis involving the posterolateral basal segments.    Small right pleural effusion some of which may be loculated.    Left upper lobe: Confluent airspace opacity posterior pickle segment left upper lobe, unchanged.    Lingula: Well aerated, unchanged    Left lower lobe: Nodule 7 mm, unchanged.    The upper abdominal organs demonstrate oval adrenal masses bilaterally, unchanged from the prior study, stability over time favors benign adrenal adenoma.  The osseous structures demonstrate no osseous lesions.   Impression:       No evidence of pulmonary embolus.    Abnormal ground-glass opacity and patchy areas of airspace consolidation involving more so the upper lung zones, it is not significantly changed from the prior study and favors chronic interstitial lung disease, pulmonary consult advised.  Superimposed infection being bacterial viral mycobacterial or fungal not excluded.    Bilateral oval low attenuating adrenal masses suggestive of benign adrenal adenomas, unchanged from the prior study.           Assessment/Plan:     Active Diagnoses:    Diagnosis Date Noted POA    PRINCIPAL PROBLEM:  Acute on chronic diastolic heart failure [I50.33] 11/23/2018 Yes    Congestive heart failure [I50.9] 11/26/2018  Yes    Shortness of breath [R06.02] 11/23/2018 Yes    Hypertension [I10]  Yes    S/P MVR (mitral valve replacement) [Z95.2] 02/27/2013 Not Applicable      Problems Resolved During this Admission:       1) Shortness of breath with Hypoxemic respiratory failure  - repeat chest x-ray with concern for worsening pulmonary edema and persistent small right pleural effusion.  Agree to continue aggressive diuresis and discharging with home diuretic.  - PFTs negative for obstructive disease.  She did have reduction in DLCO and restriction, however this may be seen with pulmonary edema.  Exam should be repeated when euvolemic and lung function is at baseline to evaluate further.  - CTA thorax showing right upper lobe interstitial disease that was present in 2013 on a CTA and is stable in appearance.  This appears emphysematous and could certainly be related to previous smoking history.  Given stable appearance of area in question, we doubt her acute decompensation is related to this.  She has a right pleural effusion that is small and actually smaller when compared to 2013 study.  No PE found.  - she will need follow up with pulmonary clinic at [place] following discharge.  - recommend inpatient 6MWT with PT and discussion of home O2 with case management and social work.  She may benefit from PRN O2 at discharge.  - continue scheduled duonebs for now.    Thank you for involving us in the care of this patient.  We will continue to follow.    Arnulfo Summers MD  Critical Care - Medicine  [facility]  [phone]

## 2018-11-28 NOTE — NURSING
Patient transferred to floor via stretcher with telemetry monitor in place. Patient has no complaints of pain and NAD noted.

## 2018-11-28 NOTE — PT/OT/SLP PROGRESS
Physical Therapy      Patient Name:  Tara Mondragon   MRN:  [id number]    Patient JESÚS in cath procedure. Will follow up as able    Jose A Vences, PT

## 2018-11-28 NOTE — SUBJECTIVE & OBJECTIVE
Review of Systems   Constitution: Negative for chills, decreased appetite, diaphoresis, fever and weakness.   Cardiovascular: Positive for dyspnea on exertion. Negative for chest pain, claudication, cyanosis, irregular heartbeat, leg swelling, near-syncope, orthopnea, palpitations, paroxysmal nocturnal dyspnea and syncope.   Respiratory: Positive for cough. Negative for hemoptysis, shortness of breath and wheezing.    Gastrointestinal: Negative for bloating, abdominal pain, constipation, diarrhea, melena, nausea and vomiting.   Neurological: Negative for dizziness.     Objective:     Vital Signs (Most Recent):  Temp: 96.2 °F (35.7 °C) (11/28/18 0804)  Pulse: 91 (11/28/18 0804)  Resp: 18 (11/28/18 0804)  BP: 112/64 (11/28/18 0804)  SpO2: (!) 93 % (11/28/18 0753) Vital Signs (24h Range):  Temp:  [96.2 °F (35.7 °C)-97.6 °F (36.4 °C)] 96.2 °F (35.7 °C)  Pulse:  [84-95] 91  Resp:  [18-20] 18  SpO2:  [93 %-95 %] 93 %  BP: ()/(53-67) 112/64     Weight: 85.6 kg (188 lb 11.4 oz)  Body mass index is 30.46 kg/m².     SpO2: (!) 93 %  O2 Device (Oxygen Therapy): room air      Intake/Output Summary (Last 24 hours) at 11/28/2018 0858  Last data filed at 11/28/2018 0600  Gross per 24 hour   Intake 305 ml   Output 1500 ml   Net -1195 ml       Lines/Drains/Airways     Peripheral Intravenous Line                 Peripheral IV - Single Lumen 11/27/18 1030 Anterior;Left Forearm less than 1 day         Peripheral IV - Single Lumen 11/27/18 1656 Left Upper Arm less than 1 day                Physical Exam   Constitutional: She is oriented to person, place, and time. She appears well-developed and well-nourished. No distress.   Cardiovascular: Normal rate and regular rhythm. Exam reveals no gallop.   No murmur heard.  Pulmonary/Chest: Effort normal and breath sounds normal. No respiratory distress. She has no wheezes.   Abdominal: Soft. Bowel sounds are normal. She exhibits no distension. There is no tenderness.   Neurological:  She is alert and oriented to person, place, and time.   Skin: Skin is warm and dry.       Significant Labs:     Recent Labs   Lab 11/28/18  0529   *   K 4.3   CL 96   CO2 29   BUN 38*   CREATININE 1.2   MG 2.2         Significant Imaging: Echocardiogram:   Transthoracic echo (TTE) complete (Cupid Only):   Results for orders placed or performed during the hospital encounter of 11/17/18   Transthoracic echo (TTE) complete (Cupid Only)   Result Value Ref Range    BSA 1.95 m2    LA WIDTH 3.80 cm    LVIDD 4.45 3.5 - 6.0 cm    IVS 1.19 (A) 0.6 - 1.1 cm    PW 1.25 (A) 0.6 - 1.1 cm    Ao root annulus 2.67 cm    LVIDS 2.38 2.1 - 4.0 cm    FS 47 28 - 44 %    LA volume 70.05 cm3    LV mass 199.42 g    LA size 4.49 cm    RVDD 2.59 cm    Left Ventricle Relative Wall Thickness 0.56 cm    AV mean gradient 27 mmHg    AV valve area 1.13 cm2    E/A ratio 1.28     E wave decelartion time 220.46 msec    LVOT diameter 1.90 cm    LVOT area 2.83 cm2    LVOT peak VTI 29.83 cm    Ao peak tristan 2.83 m/s    Ao VTI 75.00 cm    LVOT stroke volume 84.53 cm3    AV peak gradient 32.04 mmHg    MV Peak E Tristan 2.16 m/s    TR Max Tristan 3.72 m/s    MV Peak A Tristan 1.69 m/s    LV Systolic Volume 19.69 mL    LV Systolic Volume Index 10.1 mL/m2    LV Diastolic Volume 90.13 mL    LV Diastolic Volume Index 46.22 mL/m2    LA Volume Index 35.9 mL/m2    LV Mass Index 102.3 g/m2    RA Major Axis 3.93 cm    Left Atrium Minor Axis 4.83 cm    Left Atrium Major Axis 4.83 cm    Triscuspid Valve Regurgitation Peak Gradient 55.35 mmHg    Right Atrial Pressure (from IVC) 3 mmHg    MV mean gradient 8 mmHg    MV valve area p 1/2 method 2.97 cm2    TV rest pulmonary artery pressure 58.35 mmHg    MV stenosis pressure 1/2 time 74 ms

## 2018-11-28 NOTE — NURSING
Patient fingers noted to have worsening discoloration, 4cc of air released out of band. Hand noted to have significant improvement in color with improved pulse. Patient has no complaints of pain and hs no complaints of numbness or tingling. Will continue to monitor.

## 2018-11-28 NOTE — PLAN OF CARE
Problem: Patient Care Overview  Goal: Plan of Care Review  Outcome: Ongoing (interventions implemented as appropriate)  Pt on RA with documented sats. No distress noted. The proper method of use, as well as anticipated side effects, of this aerosol treatment are discussed and demonstrated to the patient.  Will continue to monitor.

## 2018-11-28 NOTE — NURSING
Remaining air removed from right radial vasc band. Gauze and tegaderm dressing applied c.d.i.   No drainage or shadowing noted. No bleeding or hematoma noted around site. +2 mitchell radial pulses palpated. Skin normal in color, warm to touch, < 3 sec cap refill.   Pt tolerated well.  Will continue to monitor pt.

## 2018-11-28 NOTE — NURSING
Patient able to tolerate water with no problems. No coughing or choking noted. Will continue to monitor.

## 2018-11-28 NOTE — PROCEDURES
: Lefty Bailey MD  Pre-procedure diagnosis: CHF, CAD  Post-procedure diagnosis: CHF, CAD    Post Procedure Note: LHC/RHC    The pt was brought to the cath lab and under sterile technique, Left radial access was obtained without difficulty and right brachial vein access obtained without difficulty. Images were obtained in multiple views and occluded SVG-PDA with patent native RCA, patent LIMA-LAD, patent native LCX with patent stent, LVEDP 18, elevated right sided pressures, 4+ severe MR, normal EF with apical aneurysm. Please see full report for details. The pt tolerated the procedure well without complications. Radial band device used with successful hemostasis of left radial access site and manual pressure held over venous access site with successful hemostasis.     Vitals:    11/28/18 0804 11/28/18 1120 11/28/18 1200 11/28/18 1221   BP: 112/64 (!) 94/52     BP Location: Right arm Right arm     Patient Position: Sitting Lying     Pulse: 91 81 82    Resp: 18 18     Temp: 96.2 °F (35.7 °C) 97.6 °F (36.4 °C)     TempSrc: Oral Oral     SpO2:    (!) 92%   Weight:       Height:             Gen: NAD  Ext: 2+ radial pulse, no evidence of hematoma  Estimated blood loss: < 50 cc    Plan:  -Post cath care per protocol  -eval severe MR

## 2018-11-28 NOTE — ASSESSMENT & PLAN NOTE
-orginally felt to be related to ADHF  -aggressive diuresis in process with decent response  -SOB slightly improved over past 24 hours  -Pulmonary on board with no evidence of obstruction on PFTs; CTA reviewed  -will likely proceed with LHC and RHC today

## 2018-11-28 NOTE — PLAN OF CARE
Problem: Patient Care Overview  Goal: Plan of Care Review  Outcome: Revised  NAD noted. PT AAOX4. Fall risk education reviewed with PT. Pt verbalizes understanding. Plan of care reviewed with PT. Pt verbalizes understanding. PT remains on tele NSR with no red alarms noted. Call light within reach, bed in lowest position, wheels locked, bed alarm on. Will continue to monitor.

## 2018-11-28 NOTE — ASSESSMENT & PLAN NOTE
-recent TTE in 11/2018 with normal LVEF and indeterminate diastolic dysfunction; previous echo in 2013 with diastolic dysfunction  -felt to be related to dietary indiscretion of salt intake  -volume overloaded on exam and pulmonary edema on CXR upon admission; repeat CXR with no significant improvement in pulmonary edema; continued on IV Lasix BID with 1.6 liters out overnight and negative 5.9 liters since admission;   -Pulmonary on board with PFTs with no evidence of obstruction; CTA yesterday with no PE and ground glass opacities favoring ILD   -will continue IV Lasix BID for now; will proceed with LHC and RHC today to assess graft patency as well as filling pressures   -continue Norvasc, Metoprolol and Aldactone

## 2018-11-28 NOTE — PROGRESS NOTES
REF:  [facility]    CC:  Failing mitral bioprosthesis    Ms. Mondragon is a 71 yo woman with MVR/CABG in 2013 (27[employer] by Eren) who was admitted with pulmonary edema and wide open MR to [facility].  [facility]o thinks she isn't a good surgical candidate.  Her BMI is 31, EF preserved, and native coronaries and/or grafts are patent by recent cath.      LINDA:  Wide open MR.    IMP: Failing mitral bioprosthesis with wide open MR    REC:  Evaluate for surgery vs. TMVR.

## 2018-11-28 NOTE — PLAN OF CARE
Problem: Patient Care Overview  Goal: Plan of Care Review  Outcome: Ongoing (interventions implemented as appropriate)  Plan of care reviewed with patient and spouse earlier this am. Voiced understanding. NSR on monitor with no red alarms noted. IV lasix administered today per orders. Ambulated to and from bathroom and around room throughout morning. Unable to ambulate in fonseca yet secondary to cath procedure. Will continue to monitor patient and complete recovery after return from cathlab.

## 2018-11-28 NOTE — NURSING
"Patient able to ambulate to restroom with assistance of two nurse. Patient complained of brief lightheadedness but stated "It passed". Patient ambulated back to bed with assistance from nurse with no issues and no further complaints of lightheadedness or dizziness. Will continue to monitor.   "

## 2018-11-29 ENCOUNTER — TELEPHONE (OUTPATIENT)
Dept: CARDIOLOGY | Facility: CLINIC | Age: 73
End: 2018-11-29

## 2018-11-29 VITALS
WEIGHT: 189.81 LBS | HEIGHT: 66 IN | DIASTOLIC BLOOD PRESSURE: 59 MMHG | TEMPERATURE: 97 F | OXYGEN SATURATION: 93 % | BODY MASS INDEX: 30.51 KG/M2 | HEART RATE: 95 BPM | RESPIRATION RATE: 20 BRPM | SYSTOLIC BLOOD PRESSURE: 115 MMHG

## 2018-11-29 DIAGNOSIS — I35.0 SEVERE AORTIC STENOSIS: Primary | ICD-10-CM

## 2018-11-29 LAB
ANION GAP SERPL CALC-SCNC: 10 MMOL/L
BUN SERPL-MCNC: 46 MG/DL
CALCIUM SERPL-MCNC: 9.4 MG/DL
CATH EF QUANTITATIVE: 65 %
CHLORIDE SERPL-SCNC: 98 MMOL/L
CO2 SERPL-SCNC: 26 MMOL/L
CREAT SERPL-MCNC: 1.3 MG/DL
EST. GFR  (AFRICAN AMERICAN): 46 ML/MIN/1.73 M^2
EST. GFR  (NON AFRICAN AMERICAN): 40 ML/MIN/1.73 M^2
GLUCOSE SERPL-MCNC: 123 MG/DL
MAGNESIUM SERPL-MCNC: 2.2 MG/DL
OHS CV CATH LVEDP POST: 21
OHS CV CATH LVEDP PRE: 18
POTASSIUM SERPL-SCNC: 4.4 MMOL/L
SODIUM SERPL-SCNC: 134 MMOL/L

## 2018-11-29 PROCEDURE — 25000003 PHARM REV CODE 250: Performed by: STUDENT IN AN ORGANIZED HEALTH CARE EDUCATION/TRAINING PROGRAM

## 2018-11-29 PROCEDURE — 97116 GAIT TRAINING THERAPY: CPT

## 2018-11-29 PROCEDURE — 25000003 PHARM REV CODE 250: Performed by: NURSE PRACTITIONER

## 2018-11-29 PROCEDURE — 27000221 HC OXYGEN, UP TO 24 HOURS

## 2018-11-29 PROCEDURE — 80048 BASIC METABOLIC PNL TOTAL CA: CPT

## 2018-11-29 PROCEDURE — 36415 COLL VENOUS BLD VENIPUNCTURE: CPT

## 2018-11-29 PROCEDURE — 83735 ASSAY OF MAGNESIUM: CPT

## 2018-11-29 PROCEDURE — 97110 THERAPEUTIC EXERCISES: CPT

## 2018-11-29 PROCEDURE — 94761 N-INVAS EAR/PLS OXIMETRY MLT: CPT

## 2018-11-29 PROCEDURE — 99239 HOSP IP/OBS DSCHRG MGMT >30: CPT | Mod: ,,, | Performed by: STUDENT IN AN ORGANIZED HEALTH CARE EDUCATION/TRAINING PROGRAM

## 2018-11-29 RX ORDER — FUROSEMIDE 40 MG/1
40 TABLET ORAL 2 TIMES DAILY
Status: DISCONTINUED | OUTPATIENT
Start: 2018-11-29 | End: 2018-11-29 | Stop reason: HOSPADM

## 2018-11-29 RX ORDER — FUROSEMIDE 40 MG/1
40 TABLET ORAL 2 TIMES DAILY
Qty: 60 TABLET | Refills: 11 | Status: SHIPPED | OUTPATIENT
Start: 2018-11-29 | End: 2020-05-22 | Stop reason: SDUPTHER

## 2018-11-29 RX ORDER — METOPROLOL TARTRATE 50 MG/1
50 TABLET ORAL EVERY 6 HOURS
Qty: 120 TABLET | Refills: 11 | Status: ON HOLD | OUTPATIENT
Start: 2018-11-29 | End: 2019-01-26 | Stop reason: HOSPADM

## 2018-11-29 RX ORDER — METOPROLOL TARTRATE 50 MG/1
50 TABLET ORAL EVERY 6 HOURS
Status: DISCONTINUED | OUTPATIENT
Start: 2018-11-29 | End: 2018-11-29 | Stop reason: HOSPADM

## 2018-11-29 RX ORDER — POTASSIUM CHLORIDE 20 MEQ/1
40 TABLET, EXTENDED RELEASE ORAL 2 TIMES DAILY
Qty: 60 TABLET | Refills: 11 | Status: SHIPPED | OUTPATIENT
Start: 2018-11-29 | End: 2019-01-09 | Stop reason: SDUPTHER

## 2018-11-29 RX ADMIN — FUROSEMIDE 40 MG: 40 TABLET ORAL at 08:11

## 2018-11-29 RX ADMIN — SPIRONOLACTONE 25 MG: 25 TABLET ORAL at 08:11

## 2018-11-29 RX ADMIN — AMLODIPINE BESYLATE 5 MG: 5 TABLET ORAL at 08:11

## 2018-11-29 RX ADMIN — ATORVASTATIN CALCIUM 40 MG: 40 TABLET, FILM COATED ORAL at 08:11

## 2018-11-29 RX ADMIN — METOPROLOL TARTRATE 50 MG: 50 TABLET ORAL at 08:11

## 2018-11-29 RX ADMIN — PANTOPRAZOLE SODIUM 40 MG: 40 TABLET, DELAYED RELEASE ORAL at 08:11

## 2018-11-29 RX ADMIN — ASPIRIN 81 MG: 81 TABLET, COATED ORAL at 08:11

## 2018-11-29 RX ADMIN — POTASSIUM CHLORIDE 40 MEQ: 20 TABLET, EXTENDED RELEASE ORAL at 08:11

## 2018-11-29 NOTE — PLAN OF CARE
Problem: Physical Therapy Goal  Goal: Physical Therapy Goal  Goals to be met by: 2018     Patient will increase functional independence with mobility by performin. Gait  x >200 feet with Modified [place] using no AD.      Outcome: Ongoing (interventions implemented as appropriate)   Progressing with PT POC.   Rec OP cardiac rehab upon discharge when medically appropriate.

## 2018-11-29 NOTE — PLAN OF CARE
Went over discharge instructions, verbalized understanding . Removed IV, tip intact. Removed tele, no ectopy on tele.

## 2018-11-29 NOTE — PLAN OF CARE
Problem: Patient Care Overview  Goal: Plan of Care Review  Outcome: Ongoing (interventions implemented as appropriate)   11/29/18 6179   Coping/Psychosocial   Plan Of Care Reviewed With patient   POC reviewed with the pt, verbalized understanding.  AAOx4.  No complaint of pain during night.  Cath lab site d/c/i and VS checked every hour till 2130.  On continuous telemetry monitor, NSR.  No ectopy noted.  Safety maintained, free of falls throughout shift.  Instructed to call for any assistance.  Will continue to monitor.

## 2018-11-29 NOTE — NURSING
Patient arrived from cath lab. Vasc band in place to left arm. No bleeding noted. Will continue to monitor and remove air.

## 2018-11-29 NOTE — PLAN OF CARE
Future Appointments   Date Time Provider Department Center   12/4/2018  8:40 AM Jaun Mata MD [facility] FAM MED [facility]   12/12/2018  8:00 AM iQng Duffy MD [facility] CARDIO [facility]   12/17/2018 10:15 AM LAB, APPOINTMENT [place] NOM LAB [facility]   12/17/2018 11:00 AM [facility] CT1 64- LIMIT 650 LBS NOM CT SCAN [facility]   12/17/2018  1:00 PM INTERVENTIONAL, VALVE CLINIC NOM CARDVAL [facility]   1/17/2019  8:00 AM Qing Duffy MD [facility] CARDIO [facility]       Discharge rounds on patient. Discussed followup appointments, blue discharge folder, discharge nurse will go over home medications and reasons for medications and final discharge instructions. All patient/caregiver questions answered. Patient verbalized understanding.         11/29/18 1601   Final Note   Assessment Type Final Discharge Note   Anticipated Discharge Disposition Home   Hospital Follow Up  Appt(s) scheduled? Yes   Discharge plans and expectations educations in teach back method with documentation complete? Yes   Right Care Referral Info   Post Acute Recommendation No Care     Marla Medrano, RN, CCM, CMSRN  RN Transition Navigator  [phone]

## 2018-11-29 NOTE — PROGRESS NOTES
.Pharmacy New Medication Education    Patient accepted medication education.    Pharmacy educated patient on name and purpose of medications and possible side effects, using the teach-back method.     Current Inpatient Medication Orders   acetaminophen tablet 650 mg   amLODIPine tablet 5 mg   aspirin EC tablet 81 mg   atorvastatin tablet 40 mg   enoxaparin injection 40 mg   * furosemide tablet 40 mg   * heparin infusion 1,000 units/500 ml in 0.9% NaCl (pressure line flush)   metoprolol tartrate (LOPRESSOR) tablet 50 mg   pantoprazole EC tablet 40 mg   polyethylene glycol packet 17 g   potassium chloride SA CR tablet 40 mEq   ramelteon tablet 8 mg   senna-docusate 8.6-50 mg per tablet 2 tablet   simethicone chewable tablet 80 mg   sodium chloride 0.9% flush 3 mL   spironolactone tablet 25 mg          Learners of pharmacy medication education included:  Patient    Patient +/- learner response:  Verbalized Understanding, Teachback

## 2018-11-29 NOTE — PLAN OF CARE
Got patient up and ambulated on room air to the nurses station,90-92%, did great, a little SOB reported.

## 2018-11-29 NOTE — PLAN OF CARE
Problem: Patient Care Overview  Goal: Plan of Care Review  Outcome: Outcome(s) achieved Date Met: 11/29/18 11/29/18 1600   Coping/Psychosocial   Plan Of Care Reviewed With patient;spouse   Patient awake , alert and oriented. On tele, no ectopy on tele. Spouse at the bedside. Ambulated the paient 3 times today O2 stayed about 90%. Patient on lovanox VTE. . Patient on lasix, urinating well. Non skid socks when out of bed, call light in reach chair/bed alarm on, patient instructed to call to get up, verbalized understanding.

## 2018-11-29 NOTE — PLAN OF CARE
Problem: Patient Care Overview  Goal: Plan of Care Review  Outcome: Ongoing (interventions implemented as appropriate)  Patient received on 1 LPM NC, O2 sats notated. Will continue to monitor.

## 2018-11-29 NOTE — TELEPHONE ENCOUNTER
----- Message from RHONA June ANP sent at 11/29/2018 12:08 PM CST -----  Contact: Lorraine  Can you please move her appt with Dr. Duffy to next week from 12/12?    Thanks  Lorraine

## 2018-11-29 NOTE — DISCHARGE INSTRUCTIONS
Please increase your Metoprolol to 50mg by mouth four times per day. Monitor your blood pressure at home and if your systolic BP (top number) is less than 90mmHg then decrease it back to two times per day. If your blood pressure remains greater than 90mmHg please continued 4 x per day    Continue taking Lasix 40mg by mouth two times per day    NO SALT!!!!!!!!! NO EATING OUT!!!!!!!!!!    Someone from the clinic should call you tomorrow to move your appt with Dr. Duffy up to next week

## 2018-11-29 NOTE — PT/OT/SLP PROGRESS
Physical Therapy Treatment    Patient Name:  Tara Mondragon   MRN:  [id number]    Recommendations:     Discharge Recommendations:  other (see comments)(cardiopulmonary rehab OP - home w spouse assist)   Discharge Equipment Recommendations: (single pt cane - pt reports she has one at home (was not using))   Barriers to discharge: None    Assessment:     Tara Mondragon is a 73 y.o. female admitted with a medical diagnosis of Acute on chronic diastolic heart failure.  She presents with the following impairments/functional limitations:  weakness, impaired functional mobilty, impaired endurance, impaired self care skills, impaired balance, gait instability, decreased safety awareness, impaired cardiopulmonary response to activity, decreased lower extremity function.  Progressing with PT POC.   Rec OP cardiac rehab upon discharge when medically appropriate.         Rehab Prognosis:  good; patient would benefit from acute skilled PT services to address these deficits and reach maximum level of function.      Recent Surgery: Procedure(s) (LRB):  Left heart cath (N/A) 1 Day Post-Op    Plan:     During this hospitalization, patient to be seen 6 x/week to address the above listed problems via gait training, therapeutic activities, therapeutic exercises  · Plan of Care Expires:  12/24/18   Plan of Care Reviewed with: patient    Subjective     Communicated with nsg prior to session.  Patient found seated OOB chair upon PT entry to room, agreeable to treatment.      Chief Complaint: SOB -  Initially pt reported no pain, nausea or dizziness;   After initial gait attempt, pt reported dizziness and sat EOB, reported she has not eaten for 3 days, provided orange juice and after rest returned to ambulation activity without dizziness c/o,   Patient comments/goals: PLOF  Pain/Comfort:  · Pain Rating 1: 0/10  · Pain Rating Post-Intervention 1: 0/10    Patients cultural, spiritual, Sabianism conflicts given the current situation: none  reported    Objective:     Patient found with: telemetry, oxygen     General Precautions: Standard, fall   Orthopedic Precautions:N/A   Braces: N/A     Functional Mobility:  · Bed mobility:  Mod ind  · Txfs:  Sit<>stand from EOB and from low surface chair w SBA and VC technique;  Pt stand>sit with tendency to sit prior to fully reaching chair surface and twisting body in rush to sit - safety ed provided throughout for improved technique  · Gait:  amb without AD ~12', then after rest and drinking OJ amb ~30' x 2 reps w seated rest between trials with NC O2 1L intact throughout with CGA with Good- balance, pt reaching for wall twice during amb;  Ed rec spc utilization as improving endurance and balance      AM-PAC 6 CLICK MOBILITY  Turning over in bed (including adjusting bedclothes, sheets and blankets)?: 4  Sitting down on and standing up from a chair with arms (e.g., wheelchair, bedside commode, etc.): 4  Moving from lying on back to sitting on the side of the bed?: 4  Moving to and from a bed to a chair (including a wheelchair)?: 4  Need to walk in hospital room?: 3  Climbing 3-5 steps with a railing?: 3  Basic Mobility Total Score: 22       Therapeutic Activities and Exercises:   seated therex BLE 2 x 10 reps LAQ, hip flex, abd/add and 3x 10 reps AP,  Sit EOB bal/endurance, txf training, gait training, provided OJ and rest between activities throughout session,  Safety ed throughout session for tech w mobility/using AD PRN/importance of nutrition/PT POC provided to pt and her     Patient left up in chair with all lines intact, call button in reach, chair alarm on, nsg notified and  present..    GOALS:   Multidisciplinary Problems     Physical Therapy Goals        Problem: Physical Therapy Goal    Goal Priority Disciplines Outcome Goal Variances Interventions   Physical Therapy Goal     PT, PT/OT Ongoing (interventions implemented as appropriate)     Description:  Goals to be met by: 12/24/2018      Patient will increase functional independence with mobility by performin. Gait  x >200 feet with Modified [place] using no AD.                       Time Tracking:     PT Received On: 18  PT Start Time: 1041     PT Stop Time: 1115  PT Total Time (min): 34 min     Billable Minutes: Gait Training 15 and Therapeutic Exercise 19    Treatment Type: Treatment  PT/PTA: PT     PTA Visit Number: 0     Demetria Perdue, PT  2018

## 2018-11-30 ENCOUNTER — PATIENT OUTREACH (OUTPATIENT)
Dept: ADMINISTRATIVE | Facility: CLINIC | Age: 73
End: 2018-11-30

## 2018-11-30 NOTE — DISCHARGE SUMMARY
[facility]  Cardiology  Discharge Summary      Patient Name: Tara Mondragon  MRN: [id number]  Admission Date: 11/23/2018  Hospital Length of Stay: 3 days  Discharge Date and Time: 11/29/2018  4:46 PM  Attending Physician: Cami att. providers found    Discharging Provider: Marino Castillo NP  Primary Care Physician: Jaun Mata MD    HPI:   71yo female with history of CAD s/p 2V CABG 2/2013 (LIMA-LAD; SVG-PDA) & MR s/p MVR 2/2013 (27mm [employer] porcine valve), diastolic dysfunction, PAD (occluded LSFA and left PFA on C 2/2013),  HTN, HLP and hypothyroidism who presents to the ER with complaints of SOB. Ms. Mondragon was admitted last week for similar symptoms and underwent stress test (no evidence of ischemia) and LINDA (moderate MR and mild AS). Her symptoms were felt to be related to dietary indiscretion of salt intake. She was discharged home on Lasix 40mg po daily. She was feeling well the day of discharge and was able to ambulate around the unit with resolution of SOB. She was feeling well upon discharge and was able to do full activities. However her SOB returned on Wednesday with progressive worsening. She called Dr. Duffy't office on Wednesday and was instructed to increase her Lasix to BID. She reports the SOB did not improve and worsened prompting her to present to the ER this morning. She reports eating pizza on Tuesday and went out to eat yesterday for Thanksgiving despite her instructions for low salt diet upon discharge. She denies any chest pain, palpitations or dizziness.     Procedure(s) (LRB):  Left heart cath (N/A)     Indwelling Lines/Drains at time of discharge:  Lines/Drains/Airways          None          Hospital Course:  11/23/2018 Presented to the ER with complaints of SOB with progressive worsening.  slightly higher than previous reading at 141. Troponin .010. CXR  suggestive of pulmonary edema/CHF with possible small right pleural effusion worse than previous CXR.  BMP and CBC WNL. Given Lasix 80mg IV in the ER with 250cc out so far. Will admit to observation under care of [facility] for ADHF related to dietary indiscretion of salt intake  11/24/2018 Pt had subjective dyspnea with talking. Pt only diuresis about 2 L of fluid, net neg only 1 liter. Renal function is stable.  No ovenight issues.  11/25/2018 Pt had HERNANDES, with hypoxia with ambulating down the fonseca, O2 sat > 86. Pt has wheezes on exam. UOP ~ 2L, net neg 1.5.  Chest pain free  11/26/2018 Remains on IV Lasix BID with 1.7 liters out overnight and negative 3.4 liters since admission. SOB with ambulating to bathroom this AM. Will repeat ambulatory pulse ox. Proceed with PFTs today as well as CXR PA& lat. HR and BP stable   11/27/2018 Remains on IV Lasix BID with dose of IVPB Diuril given yesterday. 2.0 liters out overnight and negative 4.6 liters since admission. HR and BP stable overnight. Patient reports SOB improved. Pulmonary consulted yesterday with recs reviewed. PFTs this AM. CTA chest as well today. Will continue IV Lasix BID for now.   11/28/2018 Remains on IV Lasix BID with 1.6 liters out overnight. Negative 5.9 liters since admission. PFTs with no evidence of obstruction. CTA with no evidence of PE; ground glass opacity to ULL unchanged from previous images favoring ILD. SOB better today per patient but not completely resolved. CBC and BMP stable. Discussed further evaluation for SOB with patient to include LHC and RHC. Will proceed today   11/29/2018 Underwent LHC and RHC via left brachial and left radial site with following results:      · LVEDP 18  · EF 65%  · Severe (4+) mitral regurgitation with severe LA dilation.  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  · Patent LIMA-LAD, occluded SVG-RCA  · Native RCA patent with mild to moderate disease  · Separate ostia of LAD and LCX and LCX native circulation patent with patent mid stent  · RHC pressures: RA 8/7 (4), RV 61/4, RPAP 60/18, PWP 21/48 (26), SUZIE CO  4.73, SUZIE CI 2.43      Continued on IV Lasix BID overnight with only 875cc out ?accuracy but negative 6.4 liters since admission. Reports SOB overnight but unable to elaborate. Will transition to oral Lasix BID this AM and assess response. Will ambulate in the hallway for evaluation of SOB. Will evaluate for discharge later today. Referred to [facility] valve clinic for further evaluation and treatment of MR         Consults:   Consults (From admission, onward)        Status Ordering Provider     Inpatient consult to Pulmonology  Once     Provider:  (Not yet assigned)    BRENDAN Rashid          Significant Diagnostic Studies: Labs:   BMP:   Recent Labs   Lab 11/29/18  0539   *   *   K 4.4   CL 98   CO2 26   BUN 46*   CREATININE 1.3   CALCIUM 9.4   MG 2.2   , CMP   Recent Labs   Lab 11/29/18  0539   *   K 4.4   CL 98   CO2 26   *   BUN 46*   CREATININE 1.3   CALCIUM 9.4   ANIONGAP 10   ESTGFRAFRICA 46*   EGFRNONAA 40*   , CBC No results for input(s): WBC, HGB, HCT, PLT in the last 48 hours., INR   Lab Results   Component Value Date    INR 1.0 11/17/2018    INR 2.0 (H) 06/04/2013    INR 1.7 (H) 05/21/2013   , Lipid Panel   Lab Results   Component Value Date    CHOL 106 (L) 05/27/2017    HDL 46 05/27/2017    LDLCALC 47.8 (L) 05/27/2017    TRIG 61 05/27/2017    CHOLHDL 43.4 05/27/2017   , Troponin No results for input(s): TROPONINI in the last 168 hours., A1C: No results for input(s): HGBA1C in the last 4320 hours. and All labs within the past 24 hours have been reviewed  Cardiac Graphics: Echocardiogram:   Transthoracic echo (TTE) complete (Cupid Only):   Results for orders placed or performed during the hospital encounter of 11/17/18   Transthoracic echo (TTE) complete (Cupid Only)   Result Value Ref Range    BSA 1.95 m2    LA WIDTH 3.80 cm    LVIDD 4.45 3.5 - 6.0 cm    IVS 1.19 (A) 0.6 - 1.1 cm    PW 1.25 (A) 0.6 - 1.1 cm    Ao root annulus 2.67 cm    LVIDS 2.38 2.1 - 4.0 cm    FS  47 28 - 44 %    LA volume 70.05 cm3    LV mass 199.42 g    LA size 4.49 cm    RVDD 2.59 cm    Left Ventricle Relative Wall Thickness 0.56 cm    AV mean gradient 27 mmHg    AV valve area 1.13 cm2    E/A ratio 1.28     E wave decelartion time 220.46 msec    LVOT diameter 1.90 cm    LVOT area 2.83 cm2    LVOT peak VTI 29.83 cm    Ao peak tristan 2.83 m/s    Ao VTI 75.00 cm    LVOT stroke volume 84.53 cm3    AV peak gradient 32.04 mmHg    MV Peak E Tristan 2.16 m/s    TR Max Tristan 3.72 m/s    MV Peak A Tristan 1.69 m/s    LV Systolic Volume 19.69 mL    LV Systolic Volume Index 10.1 mL/m2    LV Diastolic Volume 90.13 mL    LV Diastolic Volume Index 46.22 mL/m2    LA Volume Index 35.9 mL/m2    LV Mass Index 102.3 g/m2    RA Major Axis 3.93 cm    Left Atrium Minor Axis 4.83 cm    Left Atrium Major Axis 4.83 cm    Triscuspid Valve Regurgitation Peak Gradient 55.35 mmHg    Right Atrial Pressure (from IVC) 3 mmHg    MV mean gradient 8 mmHg    MV valve area p 1/2 method 2.97 cm2    TV rest pulmonary artery pressure 58.35 mmHg    MV stenosis pressure 1/2 time 74 ms       Pending Diagnostic Studies:     None          Final Active Diagnoses:    Diagnosis Date Noted POA    PRINCIPAL PROBLEM:  Acute on chronic diastolic heart failure [I50.33] 11/23/2018 Yes    Congestive heart failure [I50.9] 11/26/2018 Yes    Shortness of breath [R06.02] 11/23/2018 Yes    Hypertension [I10]  Yes    S/P MVR (mitral valve replacement) [Z95.2] 02/27/2013 Not Applicable      Problems Resolved During this Admission:     No new Assessment & Plan notes have been filed under this hospital service since the last note was generated.  Service: Cardiology      Discharged Condition: stable    Disposition: Home or Self Care    Follow Up:  Follow-up Information     Jaun Mata MD On 12/4/2018.    Specialty:  Internal Medicine  Why:  8:40am  Contact information:  [address]  [address]  [place] [address]  [phone]             Qing Duffy MD On 12/4/2018.     Specialty:  Cardiology  Why:  at 10:40am  Contact information:  [address]  [address]  [place] [address]  [phone]                 Patient Instructions:      Diet Cardiac     Notify your health care provider if you experience any of the following:  temperature >100.4     Notify your health care provider if you experience any of the following:  severe uncontrolled pain     Notify your health care provider if you experience any of the following:  difficulty breathing or increased cough     Notify your health care provider if you experience any of the following:  persistent dizziness, light-headedness, or visual disturbances     Activity as tolerated     Medications:  Reconciled Home Medications:      Medication List      START taking these medications    potassium chloride SA 20 MEQ tablet  Commonly known as:  K-DUR,KLOR-CON  Take 2 tablets (40 mEq total) by mouth 2 (two) times daily.  Replaces:  potassium chloride 8 MEQ Tbsr        CHANGE how you take these medications    metoprolol tartrate 50 MG tablet  Commonly known as:  LOPRESSOR  Take 1 tablet (50 mg total) by mouth every 6 (six) hours.  What changed:  when to take this        CONTINUE taking these medications    acetaminophen 325 MG tablet  Commonly known as:  TYLENOL  Take 325 mg by mouth once as needed for Pain.     amLODIPine 5 MG tablet  Commonly known as:  NORVASC  TAKE 1 TABLET BY MOUTH EVERY DAY     ammonium lactate 12 % Crea  Apply to feet twice daily. Avoid use between toes.     aspirin 81 MG EC tablet  Commonly known as:  ECOTRIN  Take 1 tablet by mouth Daily.     atorvastatin 40 MG tablet  Commonly known as:  LIPITOR  TAKE 1 TABLET(40 MG) BY MOUTH EVERY DAY     cilostazol 50 MG Tab  Commonly known as:  PLETAL  Take 1 tablet (50 mg total) by mouth 2 (two) times daily.     furosemide 40 MG tablet  Commonly known as:  LASIX  Take 1 tablet (40 mg total) by mouth 2 (two) times daily.     guaiFENesin 600 mg 12 hr tablet  Commonly known as:   MUCINEX  Take 1,200 mg by mouth once as needed for Congestion.     nitroGLYCERIN 0.4 MG SL tablet  Commonly known as:  NITROSTAT  Place 1 tablet (0.4 mg total) under the tongue every 5 (five) minutes as needed for Chest pain.     spironolactone 25 MG tablet  Commonly known as:  ALDACTONE  Take 1 tablet (25 mg total) by mouth once daily.        STOP taking these medications    potassium chloride 8 MEQ Tbsr  Commonly known as:  KLOR-CON  Replaced by:  potassium chloride SA 20 MEQ tablet            Time spent on the discharge of patient: 45 minutes    Marino Castillo NP  Cardiology  [facility]

## 2018-11-30 NOTE — PROGRESS NOTES
C3 nurse attempted to contact patient. No answer. The following message was left for the patient to return the call:  Good morning  I am a nurse calling on behalf of [facility] from the [facility]ination [facility].  This is a Transitional Care Call for Tara Mondragon. When you have a moment please contact us at [phone] or [phone] Monday through Friday, between the hours of 8 am to 4 pm. We look forward to speaking with you. On behalf of [facility] have a nice day.    The patient has a scheduled HOSFU appointment with Jaun Mata MD on 12/4/2018 at 8:40am

## 2018-11-30 NOTE — PT/OT/SLP DISCHARGE
Physical Therapy Discharge Summary    Name: Tara Mondragon  MRN: [id number]   Principal Problem: Acute on chronic diastolic heart failure     Patient Discharged from acute Physical Therapy on 2018.  Please refer to prior PT noted date on 2018 for functional status.     Assessment:     Patient appropriate for care in another setting.    Objective:     GOALS:   Multidisciplinary Problems     Physical Therapy Goals        Problem: Physical Therapy Goal    Goal Priority Disciplines Outcome Goal Variances Interventions   Physical Therapy Goal     PT, PT/OT Ongoing (interventions implemented as appropriate)     Description:  Goals to be met by: 2018     Patient will increase functional independence with mobility by performin. Gait  x >200 feet with Modified [place] using no AD.                       Reasons for Discontinuation of Therapy Services  Transfer to alternate level of care.      Plan:     Patient Discharged to: Outpatient Therapy Services.    Jose A Vences, PT  2018

## 2018-12-01 NOTE — PATIENT INSTRUCTIONS

## 2018-12-02 ENCOUNTER — NURSE TRIAGE (OUTPATIENT)
Dept: ADMINISTRATIVE | Facility: CLINIC | Age: 73
End: 2018-12-02

## 2018-12-02 NOTE — TELEPHONE ENCOUNTER
Daughter wanted to know if pt could take benadryl with current medications, advised her to contact [facility] pharmac[facility]

## 2018-12-03 ENCOUNTER — NURSE TRIAGE (OUTPATIENT)
Dept: ADMINISTRATIVE | Facility: CLINIC | Age: 73
End: 2018-12-03

## 2018-12-04 ENCOUNTER — OFFICE VISIT (OUTPATIENT)
Dept: FAMILY MEDICINE | Facility: CLINIC | Age: 73
End: 2018-12-04
Attending: FAMILY MEDICINE
Payer: COMMERCIAL

## 2018-12-04 ENCOUNTER — OFFICE VISIT (OUTPATIENT)
Dept: CARDIOLOGY | Facility: CLINIC | Age: 73
End: 2018-12-04
Payer: COMMERCIAL

## 2018-12-04 VITALS
WEIGHT: 190.81 LBS | SYSTOLIC BLOOD PRESSURE: 95 MMHG | OXYGEN SATURATION: 92 % | HEIGHT: 64 IN | HEART RATE: 80 BPM | DIASTOLIC BLOOD PRESSURE: 64 MMHG | BODY MASS INDEX: 32.58 KG/M2

## 2018-12-04 VITALS
OXYGEN SATURATION: 95 % | WEIGHT: 190.25 LBS | HEART RATE: 77 BPM | DIASTOLIC BLOOD PRESSURE: 69 MMHG | HEIGHT: 66 IN | SYSTOLIC BLOOD PRESSURE: 102 MMHG | BODY MASS INDEX: 30.58 KG/M2

## 2018-12-04 DIAGNOSIS — I25.10 CORONARY ARTERY DISEASE INVOLVING NATIVE CORONARY ARTERY OF NATIVE HEART WITHOUT ANGINA PECTORIS: ICD-10-CM

## 2018-12-04 DIAGNOSIS — I10 ESSENTIAL HYPERTENSION: ICD-10-CM

## 2018-12-04 DIAGNOSIS — Z95.2 S/P MVR (MITRAL VALVE REPLACEMENT): Primary | ICD-10-CM

## 2018-12-04 DIAGNOSIS — Z95.1 S/P CABG X 2: ICD-10-CM

## 2018-12-04 DIAGNOSIS — Z87.891 FORMER SMOKER: ICD-10-CM

## 2018-12-04 DIAGNOSIS — R06.02 SOB (SHORTNESS OF BREATH): ICD-10-CM

## 2018-12-04 DIAGNOSIS — I34.0 SEVERE MITRAL REGURGITATION: ICD-10-CM

## 2018-12-04 DIAGNOSIS — F41.9 ANXIETY: ICD-10-CM

## 2018-12-04 DIAGNOSIS — Z09 HOSPITAL DISCHARGE FOLLOW-UP: Primary | ICD-10-CM

## 2018-12-04 DIAGNOSIS — E78.2 MIXED HYPERLIPIDEMIA: ICD-10-CM

## 2018-12-04 DIAGNOSIS — L50.9 HIVES: ICD-10-CM

## 2018-12-04 DIAGNOSIS — I50.33 ACUTE ON CHRONIC DIASTOLIC HEART FAILURE: ICD-10-CM

## 2018-12-04 DIAGNOSIS — I73.9 PAD (PERIPHERAL ARTERY DISEASE): ICD-10-CM

## 2018-12-04 PROCEDURE — 99214 OFFICE O/P EST MOD 30 MIN: CPT | Mod: S$GLB,,, | Performed by: INTERNAL MEDICINE

## 2018-12-04 PROCEDURE — 99496 TRANSJ CARE MGMT HIGH F2F 7D: CPT | Mod: S$GLB,,, | Performed by: FAMILY MEDICINE

## 2018-12-04 PROCEDURE — 99999 PR PBB SHADOW E&M-EST. PATIENT-LVL III: CPT | Mod: PBBFAC,,, | Performed by: FAMILY MEDICINE

## 2018-12-04 PROCEDURE — 99999 PR PBB SHADOW E&M-EST. PATIENT-LVL III: CPT | Mod: PBBFAC,,, | Performed by: INTERNAL MEDICINE

## 2018-12-04 RX ORDER — ALBUTEROL SULFATE 90 UG/1
2 AEROSOL, METERED RESPIRATORY (INHALATION) EVERY 6 HOURS PRN
Qty: 6.7 G | Refills: 2 | Status: SHIPPED | OUTPATIENT
Start: 2018-12-04 | End: 2019-03-25 | Stop reason: SDUPTHER

## 2018-12-04 RX ORDER — ALPRAZOLAM 0.25 MG/1
0.25 TABLET ORAL NIGHTLY PRN
Qty: 30 TABLET | Refills: 0 | Status: SHIPPED | OUTPATIENT
Start: 2018-12-04 | End: 2019-01-09 | Stop reason: SDUPTHER

## 2018-12-04 RX ORDER — TRIAMCINOLONE ACETONIDE 1 MG/G
OINTMENT TOPICAL 2 TIMES DAILY
Qty: 30 G | Refills: 2 | Status: SHIPPED | OUTPATIENT
Start: 2018-12-04 | End: 2019-06-18

## 2018-12-04 NOTE — TELEPHONE ENCOUNTER
Reason for Disposition   [1] MODERATE difficulty breathing (e.g., speaks in phrases, SOB even at rest, pulse 100-120) AND [2] NEW-onset or WORSE than normal    Answer Assessment - Initial Assessment Questions  Pt discharged from hospital 11/30/18. Pt with cardiac hx and hx of sob. Reported tonight she has increased sob- took her med about an hr ago and getting no relief.    Protocols used: ST BREATHING DIFFICULTY-A-AH

## 2018-12-04 NOTE — PROGRESS NOTES
Transitional Care Note  Subjective:       Patient ID: Tara Mondragon is a 73 y.o. female.  Chief Complaint: Hospital Follow Up    Family and/or Caretaker present at visit?  Yes.  Diagnostic tests reviewed/disposition: I have reviewed all completed as well as pending diagnostic tests at the time of discharge.  Disease/illness education: yes  Home health/community services discussion/referrals: Patient does not have home health established from hospital visit.  They do not need home health.  If needed, we will set up home health for the patient.   Establishment or re-establishment of referral orders for community resources: No other necessary community resources.   Discussion with other health care providers: No discussion with other health care providers necessary.   73 yr old pleasant white female with CAD, HTN, HLD, PAD, MVR s/p porcine valve replacement, Hyperthyroidism presents today for her post hospital discharge follow up. C/o hives on chest wall since hospital discharge. Denies any new med/food/travel.    Hospital course - she had 2 hospital admits last month for acute decompensated heart failure - She had a complicated hospital course since previous visit. She was diuresed with IV diuretic and done well. Nuclear stress negative for ischemia. LINDA showed moderate MR with well seated mitral bioprosthetic valve. She was later readmitted again with same diagnosis. LHC showed patent LIMA and occluded SVG to PDA but patent native RCA. MR was however 4+ in severity. Follows Dr. Duffy, Cardiology.     She is s/p CABG and MVR in 2013 after presentation with CHF and NSTEMI.   Weight is down 7 lbs since previous visit.     HTN - controlled - on metoprolol and norvasc - denies any side effects    HLD - on statin - controlled - LDLCALC                  47.8 (L)            05/27/2017                  - denies any side effects    CAD/PAD/MVR/SP CABG - follows Cardiology and currently doing Cardiac Rehab    Hyperthyroidism -  used to follow endocrine and off of medications - last TSh was normal    Health maintenance  -Colonoscopy due - declines  -Mammo UTD  -Pneumovax up to date  -prevnar UTD  -labs UTD      Medication Refill   Associated symptoms include a rash. Pertinent negatives include no abdominal pain, anorexia, arthralgias, change in bowel habit, chest pain, congestion, coughing, diaphoresis, fatigue, headaches, joint swelling, myalgias, nausea, neck pain, numbness, sore throat, urinary symptoms, visual change or weakness.   Hypertension   This is a chronic problem. The current episode started more than 1 month ago. The problem has been gradually improving since onset. The problem is controlled. Pertinent negatives include no chest pain, headaches, neck pain, palpitations or shortness of breath. There are no associated agents to hypertension. Risk factors for coronary artery disease include obesity and dyslipidemia. Past treatments include calcium channel blockers and diuretics. The current treatment provides moderate improvement. There are no compliance problems.  Hypertensive end-organ damage includes CAD/MI. There is no history of CVA, left ventricular hypertrophy, PVD or retinopathy. Identifiable causes of hypertension include a thyroid problem. There is no history of chronic renal disease, coarctation of the aorta, hypercortisolism, hyperparathyroidism, pheochromocytoma or renovascular disease.   Thyroid Problem   Presents for follow-up visit. Patient reports no anxiety, cold intolerance, constipation, diaphoresis, diarrhea, fatigue, heat intolerance, menstrual problem, palpitations, tremors or visual change. The symptoms have been stable. Past treatments include nothing. The treatment provided moderate relief. The following procedures have not been performed: thyroid FNA and thyroid ultrasound. Her past medical history is significant for hyperlipidemia. There is no history of diabetes. There are no known risk factors.    Hyperlipidemia   This is a chronic problem. The current episode started more than 1 year ago. The problem is controlled. Recent lipid tests were reviewed and are normal. Exacerbating diseases include obesity. She has no history of chronic renal disease, diabetes, hypothyroidism or liver disease. There are no known factors aggravating her hyperlipidemia. Pertinent negatives include no chest pain, myalgias or shortness of breath. Current antihyperlipidemic treatment includes statins. The current treatment provides significant improvement of lipids. There are no compliance problems.  Risk factors for coronary artery disease include dyslipidemia, hypertension, obesity and post-menopausal.     Review of Systems   Constitutional: Negative.  Negative for activity change, diaphoresis, fatigue and unexpected weight change.   HENT: Negative.  Negative for congestion, ear discharge, hearing loss, rhinorrhea, sore throat and voice change.    Eyes: Negative.  Negative for pain, discharge and visual disturbance.   Respiratory: Negative.  Negative for cough, chest tightness, shortness of breath and wheezing.    Cardiovascular: Negative.  Negative for chest pain and palpitations.   Gastrointestinal: Negative.  Negative for abdominal distention, abdominal pain, anal bleeding, anorexia, change in bowel habit, constipation, diarrhea and nausea.   Endocrine: Negative.  Negative for cold intolerance, heat intolerance, polydipsia and polyuria.   Genitourinary: Negative.  Negative for decreased urine volume, difficulty urinating, dysuria, frequency, menstrual problem and vaginal pain.   Musculoskeletal: Negative.  Negative for arthralgias, gait problem, joint swelling, myalgias and neck pain.   Skin: Positive for rash. Negative for color change, pallor and wound.   Allergic/Immunologic: Negative.  Negative for environmental allergies and immunocompromised state.   Neurological: Negative.  Negative for dizziness, tremors, seizures,  speech difficulty, weakness, numbness and headaches.   Hematological: Negative.  Negative for adenopathy. Does not bruise/bleed easily.   Psychiatric/Behavioral: Negative.  Negative for agitation, confusion, decreased concentration, hallucinations, self-injury and suicidal ideas. The patient is not nervous/anxious.        PMH/PSH/FH/SH/MED/ALLERGY reviewed    Objective:       Vitals:    12/04/18 0834   BP: 102/69   Pulse: 77       Physical Exam   Constitutional: She is oriented to person, place, and time. She appears well-developed and well-nourished. No distress.   HENT:   Head: Normocephalic and atraumatic.   Right Ear: External ear normal.   Left Ear: External ear normal.   Nose: Nose normal.   Mouth/Throat: Oropharynx is clear and moist. No oropharyngeal exudate.   Eyes: Conjunctivae and EOM are normal. Pupils are equal, round, and reactive to light. Right eye exhibits no discharge. Left eye exhibits no discharge. No scleral icterus.   Neck: Normal range of motion. Neck supple. No JVD present. No tracheal deviation present. No thyromegaly present.   Cardiovascular: Normal rate, regular rhythm and intact distal pulses. Exam reveals no gallop and no friction rub.   Murmur (4/6 systolic murmer best heard in aortic area) heard.  Pulmonary/Chest: Effort normal and breath sounds normal. No stridor. No respiratory distress. She has no wheezes. She has no rales. She exhibits no tenderness.   Abdominal: Soft. Bowel sounds are normal. She exhibits no distension and no mass. There is no tenderness. There is no rebound and no guarding. No hernia.   Musculoskeletal: Normal range of motion. She exhibits no edema or tenderness.   Lymphadenopathy:     She has no cervical adenopathy.   Neurological: She is alert and oriented to person, place, and time. She has normal reflexes. She displays normal reflexes. No cranial nerve deficit. She exhibits normal muscle tone. Coordination normal.   Skin: Skin is warm and dry. Capillary  refill takes less than 2 seconds. Rash (urticaria on chest wall) noted. She is not diaphoretic. No erythema. No pallor.   Psychiatric: She has a normal mood and affect. Her behavior is normal. Judgment and thought content normal.       Assessment:       1. Hospital discharge follow-up    2. Coronary artery disease involving native coronary artery of native heart without angina pectoris    3. Essential hypertension    4. Mixed hyperlipidemia    5. PAD (peripheral artery disease)    6. SOB (shortness of breath)    7. Former smoker    8. Anxiety    9. Hives        Plan:           Tara was seen today for hospital follow up.    Diagnoses and all orders for this visit:    Hospital discharge follow-up    Coronary artery disease involving native coronary artery of native heart without angina pectoris    Essential hypertension    Mixed hyperlipidemia    PAD (peripheral artery disease)    SOB (shortness of breath)  -     albuterol (VENTOLIN HFA) 90 mcg/actuation inhaler; Inhale 2 puffs into the lungs every 6 (six) hours as needed for Wheezing or Shortness of Breath. Rescue    Former smoker  -     albuterol (VENTOLIN HFA) 90 mcg/actuation inhaler; Inhale 2 puffs into the lungs every 6 (six) hours as needed for Wheezing or Shortness of Breath. Rescue    Anxiety  -     ALPRAZolam (XANAX) 0.25 MG tablet; Take 1 tablet (0.25 mg total) by mouth nightly as needed for Anxiety.    Hives  -     triamcinolone acetonide 0.1% (KENALOG) 0.1 % ointment; Apply topically 2 (two) times daily.      Hospital DC follow up  -doing better  -follows cardiology    HTN/HLD/hyperthyroidism - controlled    CAD and PVD - stable - follows cardiology    Obesity - diet and exercise    Fatigue and hyperthyroidism - labs    HIVES  -likely anxiety  -xanax as needed  -benadryl when needed  -kenalog cream - use as directed    Spent adequate time in obtaining history and explaining differentials    40 minutes spent during this visit of which greater than 50%  devoted to face-face counseling and coordination of care regarding diagnosis and management plan    Follow-up in about 4 weeks (around 1/1/2019), or if symptoms worsen or fail to improve.

## 2018-12-04 NOTE — PROGRESS NOTES
Subjective:   Patient ID:  Tara Mondragon is a 73 y.o. female who presents for follow-up of Follow-up      Problem List Items Addressed This Visit        Cardiac/Vascular    CAD (coronary artery disease)    S/P MVR (mitral valve replacement) - Primary    S/P CABG x 2    Hypertension    Acute on chronic diastolic heart failure    Severe mitral regurgitation          HPI: Patient with the above medically present for f/u. She have had a complicated hospital course since previous visit. She had decompensated diastolic CHF. She was diuresed with IV diuretic and done well. Nuclear stress negative for ischemia. LINDA showed moderate MR with well seated mitral bioprosthetic valve. She was later readmitted again with same diagnosis. LHC showed patent LIMA and occluded SVG to PDA but patent native RCA. MR was however 4+/     She is s/p CABG and MVR in 2013 after presentation with CHF and NSTEMI.   Weight is down 7 lbs since previous visit.     Review of Systems   Constitution: Negative.   HENT: Negative.    Eyes: Negative.    Cardiovascular: Negative.    Respiratory: Negative.    Endocrine: Negative.    Hematologic/Lymphatic: Negative.    Skin: Negative.    Musculoskeletal: Negative.    Gastrointestinal: Negative.    Neurological: Negative.           Medication List           Accurate as of 12/4/18 11:01 AM. If you have any questions, ask your nurse or doctor.               START taking these medications    albuterol 90 mcg/actuation inhaler  Commonly known as:  VENTOLIN HFA  Inhale 2 puffs into the lungs every 6 (six) hours as needed for Wheezing or Shortness of Breath. Rescue  Started by:  Jaun Mata MD     ALPRAZolam 0.25 MG tablet  Commonly known as:  XANAX  Take 1 tablet (0.25 mg total) by mouth nightly as needed for Anxiety.  Started by:  Jaun Mata MD     triamcinolone acetonide 0.1% 0.1 % ointment  Commonly known as:  KENALOG  Apply topically 2 (two) times daily.  Started by:  Jaun Mata MD        CONTINUE  taking these medications    acetaminophen 325 MG tablet  Commonly known as:  TYLENOL     amLODIPine 5 MG tablet  Commonly known as:  NORVASC  TAKE 1 TABLET BY MOUTH EVERY DAY     ammonium lactate 12 % Crea  Apply to feet twice daily. Avoid use between toes.     aspirin 81 MG EC tablet  Commonly known as:  ECOTRIN     atorvastatin 40 MG tablet  Commonly known as:  LIPITOR  TAKE 1 TABLET(40 MG) BY MOUTH EVERY DAY     cilostazol 50 MG Tab  Commonly known as:  PLETAL  Take 1 tablet (50 mg total) by mouth 2 (two) times daily.     furosemide 40 MG tablet  Commonly known as:  LASIX  Take 1 tablet (40 mg total) by mouth 2 (two) times daily.     guaiFENesin 600 mg 12 hr tablet  Commonly known as:  MUCINEX     metoprolol tartrate 50 MG tablet  Commonly known as:  LOPRESSOR  Take 1 tablet (50 mg total) by mouth every 6 (six) hours.     nitroGLYCERIN 0.4 MG SL tablet  Commonly known as:  NITROSTAT  Place 1 tablet (0.4 mg total) under the tongue every 5 (five) minutes as needed for Chest pain.     potassium chloride SA 20 MEQ tablet  Commonly known as:  K-DUR,KLOR-CON  Take 2 tablets (40 mEq total) by mouth 2 (two) times daily.     spironolactone 25 MG tablet  Commonly known as:  ALDACTONE  Take 1 tablet (25 mg total) by mouth once daily.           Where to Get Your Medications      These medications were sent to [facility] [address] [place] [address] ESTELA CHRIS [address] AT [address] [place] [address]    Phone:  [phone]   · albuterol 90 mcg/actuation inhaler  · triamcinolone acetonide 0.1% 0.1 % ointment     You can get these medications from any pharmacy    Bring a paper prescription for each of these medications  · ALPRAZolam 0.25 MG tablet         Objective:   Physical Exam   Constitutional: She is oriented to person, place, and time. She appears well-developed and well-nourished. No distress.   Examination of the digits showed no clubbing or cyanosis   HENT:   Head:  Normocephalic and atraumatic.   Eyes: Conjunctivae are normal. Pupils are equal, round, and reactive to light. Right eye exhibits no discharge.   Neck: Normal range of motion. Neck supple. No JVD present. No thyromegaly present.   No carotid bruits   Cardiovascular: Normal rate, regular rhythm, S1 normal, S2 normal and intact distal pulses. PMI is not displaced. Exam reveals no gallop, no friction rub and no opening snap.   Murmur heard.  Pulses:       Popliteal pulses are 2+ on the right side, and 1+ on the left side.        Dorsalis pedis pulses are 2+ on the right side, and 0 on the left side.        Posterior tibial pulses are 2+ on the right side.   Pulmonary/Chest: Effort normal and breath sounds normal. No respiratory distress. She has no wheezes. She has no rales. She exhibits no tenderness.   Abdominal: Soft. Bowel sounds are normal. She exhibits no distension and no mass. There is no tenderness. There is no guarding.   No hepatosplenomegaly   Musculoskeletal: Normal range of motion. She exhibits no edema or tenderness.   Lymphadenopathy:     She has no cervical adenopathy.   Neurological: She is alert and oriented to person, place, and time.   Skin: Skin is warm. No rash noted. She is not diaphoretic. No erythema.   Psychiatric: She has a normal mood and affect.   Nursing note and vitals reviewed.      ECGs reviewed-NSR  LABS reviewed  Imaging including Echoes reviewed-normal ef with bioprosthetic mitral valve  LINDA- moderate to severe MR with mild AS    Assessment:     1. S/P MVR (mitral valve replacement)    2. Severe mitral regurgitation    3. S/P CABG x 2    4. Acute on chronic diastolic heart failure    5. Essential hypertension    6. Coronary artery disease involving native coronary artery of native heart without angina pectoris        Plan:     Decompensated diastolic CHF likely secondary to degraded bioprosthetic mitral valve. Severity was moderate on LINDA likely because of sedation. During [facility] it  was severe. MR is likely severe.   Take extra lasix 40 mg po if weight gain > 2 lbs in 1 day or 5 lbs in a week.   Continue current therapy  Continue exercise regimen  Refer to Dr. Jason for input to see if redo valve replacement required.   Weight loss  Low salt diet  F/u in 4 weeks.

## 2018-12-05 LAB
BRPFT: ABNORMAL
DLCO ADJ PRE: 8.9 ML/(MIN*MMHG) (ref 15.07–26.54)
DLCO SINGLE BREATH LLN: 15.07
DLCO SINGLE BREATH PRE REF: 42.8 %
DLCO SINGLE BREATH REF: 20.8
DLCOC SBVA LLN: 2.76
DLCOC SBVA PRE REF: 90.5 %
DLCOC SBVA REF: 4.19
DLCOC SINGLE BREATH LLN: 15.07
DLCOC SINGLE BREATH PRE REF: 42.8 %
DLCOC SINGLE BREATH REF: 20.8
DLCOVA LLN: 2.76
DLCOVA PRE REF: 90.5 %
DLCOVA PRE: 3.79 ML/(MIN*MMHG*L) (ref 2.76–5.61)
DLCOVA REF: 4.19
DLVAADJ PRE: 3.79 ML/(MIN*MMHG*L) (ref 2.76–5.61)
ERV LLN: 0.59
ERV REF: 0.59
ERVN2 LLN: 0.59
ERVN2 PRE REF: 57.5 %
ERVN2 PRE: 0.34 L (ref 0.59–0.59)
ERVN2 REF: 0.59
FEF 25 75 LLN: 0.75
FEF 25 75 PRE REF: 45.6 %
FEF 25 75 REF: 1.71
FEV1 FVC LLN: 64
FEV1 FVC PRE REF: 90 %
FEV1 FVC REF: 78
FEV1 LLN: 1.48
FEV1 PRE REF: 57.7 %
FEV1 REF: 2.08
FEV6 LLN: 2.05
FEV6 PRE REF: 62.2 %
FEV6 PRE: 1.7 L (ref 2.05–3.42)
FEV6 REF: 2.73
FRCN2 LLN: 1.9
FRCN2 PRE REF: 67.9 %
FRCN2 REF: 2.73
FRCPLETH LLN: 1.9
FRCPLETH REF: 2.73
FVC LLN: 1.94
FVC PRE REF: 63.5 %
FVC REF: 2.71
IVC PRE: 1.6 L (ref 1.94–3.48)
IVC SINGLE BREATH LLN: 1.94
IVC SINGLE BREATH PRE REF: 59.1 %
IVC SINGLE BREATH REF: 2.71
MVV LLN: 68
MVV REF: 80
PEF LLN: 3.64
PEF PRE REF: 72.4 %
PEF REF: 5.36
PRE DLCO: 8.9 ML/(MIN*MMHG) (ref 15.07–26.54)
PRE FEF 25 75: 0.78 L/S (ref 0.75–2.67)
PRE FET 100: 6.06 SEC
PRE FEV1 FVC: 69.77 % (ref 63.57–91.47)
PRE FEV1: 1.2 L (ref 1.48–2.67)
PRE FRC N2: 1.85 L
PRE FVC: 1.72 L (ref 1.94–3.48)
PRE PEF: 3.88 L/S (ref 3.64–7.08)
RAW LLN: 3.06
RAW REF: 3.06
RV LLN: 1.56
RV REF: 2.13
RVN2 LLN: 1.56
RVN2 PRE REF: 57.2 %
RVN2 PRE: 1.22 L (ref 1.56–2.71)
RVN2 REF: 2.13
RVN2TLCN2 LLN: 35
RVN2TLCN2 PRE REF: 104.3 %
RVN2TLCN2 PRE: 46.04 % (ref 34.53–53.71)
RVN2TLCN2 REF: 44
RVTLC LLN: 35
RVTLC REF: 44
TLC LLN: 3.98
TLC REF: 4.97
TLCN2 LLN: 3.98
TLCN2 PRE REF: 53.3 %
TLCN2 PRE: 2.65 L (ref 3.98–5.96)
TLCN2 REF: 4.97
VA PRE: 2.34 L (ref 4.82–4.82)
VA SINGLE BREATH LLN: 4.82
VA SINGLE BREATH PRE REF: 48.6 %
VA SINGLE BREATH REF: 4.82
VC LLN: 1.94
VC REF: 2.71
VCMAXN2 LLN: 1.94
VCMAXN2 PRE REF: 67.9 %
VCMAXN2 PRE: 1.84 L (ref 1.94–3.48)
VCMAXN2 REF: 2.71

## 2018-12-05 NOTE — PHYSICIAN QUERY
PT Name: Tara Mondragon  MR #: [id number]    Physician Query Form - Respiratory Condition Clarification      CDS/: Radha Rodriguez               Contact information:[email or web address]     This form is a permanent document in the medical record.    Query Date: December 5, 2018    By submitting this query, we are merely seeking further clarification of documentation. Please utilize your independent clinical judgment when addressing the question(s) below.    The Medical record contains the following   Indicators   Supporting Clinical Findings Location in Medical Record   x   SOB, HERNANDES, Wheezing, Productive Cough, Use of Accessory Muscles, etc. Shortness of breath with    Critical care medicine pn 11-27   x   Acute/Chronic Illness Acute on chronic diastolic heart failure H&P      Radiology Findings     x   Respiratory Distress or Failure Hypoxemic respiratory failure    Critical care medicine pn 11-27      Hypoxia or Hypercapnia     x   RR         ABGs         O2 sat Resp:  [16-22]   SpO2:  [92 %-98 %]     ABG  PCO2=43  PO2=56  Sat=89% Cardiology pn 11-26      ABG 11-28      BiPAP/Intubation        Supplemental O2        Home O2, Oxygen Dependence     x   Treatment IV Lasix BID Cardiology pn 11-27   x   Other 11/25/2018 Pt had HERNANDES, with hypoxia with ambulating down the fonseca, O2 sat > 86. Pt has wheezes on exam.    Cardiology pn 11-27     Respiratory failure can be acute, chronic or both. It is generally further specificed as hypoxic, hypercapnic or both. Lastly, it is important to identify an etiology, if known or suspected.   References:: https://www.acphospitalist.org/archives/2013/10/coding; htm; [email or web address]-respiratory-failure-know    The clinical guidelines noted below are only system guidelines, and do not replace the providers clinical judgment.    Provider, please specify diagnosis or diagnoses associated with above clinical findings.     Please further specify the acuity  of the respiratory failure diagnosis.    [ x  ] Acute Respiratory Distress with Hypoxia - ABG pO2 < 60 mmHg or O2 sat of 88% on RA and respiratory symptoms documented   [ x  ] Other Respiratory Diagnosis (please specify): ____Shortness of breath with accessory muscle use._____________________________   [   ]  Clinically Undetermined       Please document in your progress notes daily for the duration of treatment until resolved and include in your discharge summary.

## 2018-12-11 ENCOUNTER — OFFICE VISIT (OUTPATIENT)
Dept: CARDIOTHORACIC SURGERY | Facility: CLINIC | Age: 73
End: 2018-12-11
Payer: COMMERCIAL

## 2018-12-11 VITALS
WEIGHT: 187.19 LBS | BODY MASS INDEX: 31.96 KG/M2 | OXYGEN SATURATION: 95 % | TEMPERATURE: 98 F | HEART RATE: 75 BPM | DIASTOLIC BLOOD PRESSURE: 65 MMHG | HEIGHT: 64 IN | SYSTOLIC BLOOD PRESSURE: 119 MMHG

## 2018-12-11 DIAGNOSIS — I25.10 CORONARY ARTERY DISEASE, ANGINA PRESENCE UNSPECIFIED, UNSPECIFIED VESSEL OR LESION TYPE, UNSPECIFIED WHETHER NATIVE OR TRANSPLANTED HEART: Primary | ICD-10-CM

## 2018-12-11 DIAGNOSIS — Z95.2 S/P MVR (MITRAL VALVE REPLACEMENT): Primary | ICD-10-CM

## 2018-12-11 PROCEDURE — 99999 PR PBB SHADOW E&M-EST. PATIENT-LVL III: CPT | Mod: PBBFAC,,, | Performed by: THORACIC SURGERY (CARDIOTHORACIC VASCULAR SURGERY)

## 2018-12-11 PROCEDURE — 99205 OFFICE O/P NEW HI 60 MIN: CPT | Mod: S$GLB,,, | Performed by: THORACIC SURGERY (CARDIOTHORACIC VASCULAR SURGERY)

## 2018-12-11 NOTE — LETTER
December 11, 2018      Qing Duffy MD  [address]  [address]  [facility] [address]           [facility] - Cardiovascular Surg  [address]  [place] [address]  Phone: [phone]          Patient: Tara Mondragon   MR Number: [id number]   YOB: 1945   Date of Visit: 12/11/2018       Dear Dr. Qing Duffy:    Thank you for referring Tara Mondragon to me for evaluation. Attached you will find relevant portions of my assessment and plan of care.    If you have questions, please do not hesitate to call me. I look forward to following Tara Mondragon along with you.    Sincerely,    Damien Jason MD    Enclosure  CC:  No Recipients    If you would like to receive this communication electronically, please contact [email or web address] or [phone] to request more information on [email or web address] Link access.    For providers and/or their staff who would like to refer a patient to [facility], please contact us through our one-stop-shop provider referral line, [facility] [place], at [phone].    If you feel you have received this communication in error or would no longer like to receive these types of communications, please e-mail [email or web address]

## 2018-12-11 NOTE — PROGRESS NOTES
CARDIOTHORACIC SURGERY  History & Physical        SUBJECTIVE:     HISTORY OF PRESENT ILLNESS:  Tara Mondragon is a 73 y.o. female who has been referred to surgery clinic by Dr. Duffy with concern for mitral regurgitation. Of note, she has a history of a MVR (porcine) & CABGx2 (2/2013) by Dr. Jason and had been doing well, working, in her usual state of health until about 2 months ago when she began to have increased shortness of breath and dyspnea on exertion. She reports 2 hospitalizations given shortness of breath secondary to decompensated diastolic CHF requiring IV diuresis, one right prior to Thanksgiving and the other following. Since that time her lasix has been increased and she has not required re-hospitalization. She denies shortness of breath or difficulty breathing at rest but notes she cannot walk for before becoming dyspneic. Nuclear stress negative for ischemia. LINDA showed moderate MR with well seated mitral bioprosthetic valve. LHC showed patent LIMA and occluded SVG to PDA but patent native RCA. MR was however 4+.      MEDICATIONS:  Home Medications:  Current Outpatient Medications on File Prior to Visit   Medication Sig Dispense Refill    acetaminophen (TYLENOL) 325 MG tablet Take 325 mg by mouth once as needed for Pain.      albuterol (VENTOLIN HFA) 90 mcg/actuation inhaler Inhale 2 puffs into the lungs every 6 (six) hours as needed for Wheezing or Shortness of Breath. Rescue 6.7 g 2    ALPRAZolam (XANAX) 0.25 MG tablet Take 1 tablet (0.25 mg total) by mouth nightly as needed for Anxiety. 30 tablet 0    amLODIPine (NORVASC) 5 MG tablet TAKE 1 TABLET BY MOUTH EVERY DAY 90 tablet 0    ammonium lactate 12 % Crea Apply to feet twice daily. Avoid use between toes. 140 g 5    aspirin (ECOTRIN) 81 MG EC tablet Take 1 tablet by mouth Daily.      atorvastatin (LIPITOR) 40 MG tablet TAKE 1 TABLET(40 MG) BY MOUTH EVERY DAY 90 tablet 4    cilostazol (PLETAL) 50 MG Tab Take 1 tablet (50 mg total) by  mouth 2 (two) times daily. 60 tablet 11    furosemide (LASIX) 40 MG tablet Take 1 tablet (40 mg total) by mouth 2 (two) times daily. 60 tablet 11    metoprolol tartrate (LOPRESSOR) 50 MG tablet Take 1 tablet (50 mg total) by mouth every 6 (six) hours. 120 tablet 11    nitroGLYCERIN (NITROSTAT) 0.4 MG SL tablet Place 1 tablet (0.4 mg total) under the tongue every 5 (five) minutes as needed for Chest pain. 100 tablet 0    potassium chloride SA (K-DUR,KLOR-CON) 20 MEQ tablet Take 2 tablets (40 mEq total) by mouth 2 (two) times daily. 60 tablet 11    spironolactone (ALDACTONE) 25 MG tablet Take 1 tablet (25 mg total) by mouth once daily. 30 tablet 3    triamcinolone acetonide 0.1% (KENALOG) 0.1 % ointment Apply topically 2 (two) times daily. 30 g 2    guaifenesin (MUCINEX) 600 mg 12 hr tablet Take 1,200 mg by mouth once as needed for Congestion.       No current facility-administered medications on file prior to visit.        ALLERGIES:    Review of patient's allergies indicates:   Allergen Reactions    Plavix [clopidogrel] Rash       PAST MEDICAL HISTORY:    Past Medical History:   Diagnosis Date    Acute coronary syndrome     Cardiomyopathy     Coronary artery disease     Hypertension     Hyperthyroidism     Osteoporosis     PAD (peripheral artery disease)     Valvular regurgitation     Mild/Mod MR post MI       SURGICAL HISTORY:  Past Surgical History:   Procedure Laterality Date    CARDIAC VALVE SURGERY      porcine MVR    CHOLECYSTECTOMY      CORONARY ANGIOPLASTY      CORONARY ARTERY BYPASS GRAFT      CORONARY ARTERY BYPASS GRAFT (CABG) N/A 2/26/2013    Performed by Damien Jason MD at [facility] OR [address] [place] [address]    ECHOCARDIOGRAM, TRANSESOPHAGEAL N/A 11/19/2018    Performed by [facility]uffy [facility] at [facility] CATH LAB/EP    Left heart cath N/A 11/28/2018    Performed by Lefty Bailey MD at [facility] CATH LAB/EP    LEFT HEART CATHETERIZATION N/A 11/28/2018    Procedure: Left heart cath;  Surgeon:  Lefty Bailey MD;  Location: [facility] CATH LAB/EP;  Service: Cardiology;  Laterality: N/A;  LHC with grafts and RHC     REPAIR VALVE-MITRAL N/A 2013    [facility]    TONSILLECTOMY      TRANSESOPHAGEAL ECHOCARDIOGRAPHY N/A 2018    Procedure: ECHOCARDIOGRAM, TRANSESOPHAGEAL;  Surgeon: Qing Duffy MD;  Location: [facility] CATH LAB/EP;  Service: Cardiology;  Laterality: N/A;       FAMILY HISTORY:  Family History   Problem Relation Age of Onset    Heart failure Mother     Hypothyroidism Mother     Liver disease Father     Hypertension Sister     Hypothyroidism Sister     Hypertension Brother     Asthma Daughter        SOCIAL HISTORY:  Social History     Tobacco Use    Smoking status: Former Smoker     Packs/day: 1.00     Years: 50.00     Pack years: 50.00     Last attempt to quit: 2/3/2013     Years since quittin.8    Smokeless tobacco: Never Used   Substance Use Topics    Alcohol use: No    Drug use: No        REVIEW OF SYSTEMS:  Review of Systems   Constitutional: Negative.  Negative for activity change, diaphoresis, fatigue and unexpected weight change.   HENT: Negative.  Negative for congestion, ear discharge, hearing loss, rhinorrhea, sore throat and voice change.    Eyes: Negative.  Negative for pain, discharge and visual disturbance.   Respiratory: Negative.  Negative for cough, chest tightness, shortness of breath and wheezing.    Cardiovascular: Negative.  Negative for chest pain and palpitations.   Gastrointestinal: Negative.  Negative for abdominal distention, abdominal pain, anal bleeding, constipation, diarrhea and nausea.   Endocrine: Negative.  Negative for cold intolerance, heat intolerance, polydipsia and polyuria.   Genitourinary: Negative.  Negative for decreased urine volume, difficulty urinating, dysuria, frequency, menstrual problem and vaginal pain.   Musculoskeletal: Negative.  Negative for arthralgias, gait problem, joint  swelling, myalgias and neck pain.   Skin: Positive for rash. Negative for color change, pallor and wound.   Allergic/Immunologic: Negative.  Negative for environmental allergies and immunocompromised state.   Neurological: Negative.  Negative for dizziness, tremors, seizures, speech difficulty, weakness, numbness and headaches.   Hematological: Negative.  Negative for adenopathy. Does not bruise/bleed easily.   Psychiatric/Behavioral: Negative.  Negative for agitation, confusion, decreased concentration, hallucinations, self-injury and suicidal ideas. The patient is not nervous/anxious.        OBJECTIVE:     Most Recent Vitals:  Temp: 97.7 °F (36.5 °C) (12/11/18 1031)  Pulse: 75 (12/11/18 1031)  BP: 119/65 (12/11/18 1031)  SpO2: 95 % (12/11/18 1031)      PHYSICAL EXAM:  Physical Exam   Constitutional: She is oriented to person, place, and time and well-developed, well-nourished, and in no distress.   HENT:   Head: Normocephalic and atraumatic.   Right Ear: External ear normal.   Left Ear: External ear normal.   Eyes: Conjunctivae and EOM are normal. Pupils are equal, round, and reactive to light.   Neck: Normal range of motion. Neck supple. No thyromegaly present.   Cardiovascular: Normal rate and regular rhythm.   Murmur heard.  Pulmonary/Chest: Breath sounds normal. No respiratory distress.   Abdominal: Soft. Bowel sounds are normal. She exhibits no distension. There is no tenderness.   Musculoskeletal: Normal range of motion. She exhibits no edema.   Neurological: She is alert and oriented to person, place, and time. GCS score is 15.   Skin: Skin is warm and dry. No erythema.        LABORATORY VALUES:  Lab Results   Component Value Date    WBC 11.89 11/25/2018    HGB 13.0 11/25/2018    HCT 37.6 11/25/2018     11/25/2018    HGBA1C 5.5 09/30/2014     Lab Results   Component Value Date     (L) 11/29/2018    K 4.4 11/29/2018    CL 98 11/29/2018    CO2 26 11/29/2018    BUN 46 (H) 11/29/2018    CREATININE  1.3 11/29/2018     (H) 11/29/2018    CALCIUM 9.4 11/29/2018    MG 2.2 11/29/2018    PHOS 1.9 (L) 03/05/2013    AST 20 11/23/2018    ALT 22 11/23/2018    ALKPHOS 103 11/23/2018    BILITOT 1.3 (H) 11/23/2018    PROT 6.8 11/23/2018    ALBUMIN 3.6 11/23/2018     Lab Results   Component Value Date    INR 1.0 11/17/2018     Lab Results   Component Value Date    TSH 0.878 04/12/2018    FREET4 0.97 10/15/2013         DIAGNOSTIC STUDIES:  11/28/2018 LHC:  · LVEDP 18  · EF 65%  · Severe (4+) mitral regurgitation with severe LA dilation.  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  · Patent LIMA-LAD, occluded SVG-RCA  · Native RCA patent with mild to moderate disease  · Separate ostia of LAD and LCX and LCX native circulation patent with patent mid stent  · RHC pressures: RA 8/7 (4), RV 61/4, RPAP 60/18, PWP 21/48 (26), SUZIE CO 4.73, SUZIE CI 2.43    11/19/2018 LINDA:  Left Ventricle Normal ejection fraction at 65%. Cavity is normal. Normal wall thickness observed. Indeterminate left ventricular diastolic function.   Right Ventricle Normal cavity size, wall thickness and ejection fraction. Wall motion normal.   Left Atrium Moderate atrial enlargement.   Right Atrium Mild atrial enlargement.   Aortic Valve The valve is trileaflet. Aortic valve sclerosis is mild. Mild stenosis. No regurgitation. Mobility is mildly reduced.   Mitral Valve There is a porcine bioprosthetic mitral valve. The prosthetic valve is normal. Moderate regurgitation.   Pulmonic Valve Normal valve structure. Mobility is normal.   Ascending Aorta Grade 2 plaque present. The plaque is moderate size.     · There is a porcine bioprosthetic mitral valve. The prosthetic valve is normal.  · Moderate mitral regurgitation. No Systolic reversal on doppler of pulmonary vein  · Normal left ventricular systolic function. The estimated ejection fraction is 65%  · Mild aortic valve stenosis.  · Grade 2 plaque present.      ASSESSMENT:     Tara Mondragon is a 73 y.o. female  s/p CABGx2 & MVR in 2013 who represents to clinic today given recent admissions for decompensated heart failure and evidence of mitral regurgitation on LINDA/LHC.       PLAN:  -   CTS Attending Note:    I have personally taken the history and examined this patient and agree with the resident's note as stated above. 73-year-old woman who underwent mitral valve replacement and 2 vessel bypass in 2013.  She did well until this fall when she developed dyspnea on exertion.  She underwent a thoughtful and thorough evaluation.  The FAY to the LAD is widely patent. On echo, she has moderate mitral regurgitation.  It looked worse on the angiogram, but she was having PVCs.  She has 0 lower extremity edema on my exam.  I discussed her situation with her and her  in the exam room, and with her daughter in [place] by speaker phone.  She required a wheelchair to get to clinic today.  I do not believe she is a good candidate for reoperation.  One option would be to proceed with planning for an off-label trans apical approach and insertion of a transcatheter aortic valve in to the mitral prosthesis.  We are planning to open a transcatheter mitral trial, hopefully in the spring of next year.  I discussed this with the patient and her family.  I think the best strategy will be medical management for now.  Ultimately, trans catheter insertion of a new mitral valve will be the best option.  If medical management fails before that trial opens, then a trans apical transcatheter valve will be the best choice.

## 2018-12-12 ENCOUNTER — OFFICE VISIT (OUTPATIENT)
Dept: CARDIOLOGY | Facility: CLINIC | Age: 73
End: 2018-12-12
Payer: COMMERCIAL

## 2018-12-12 VITALS
OXYGEN SATURATION: 99 % | DIASTOLIC BLOOD PRESSURE: 60 MMHG | SYSTOLIC BLOOD PRESSURE: 140 MMHG | HEART RATE: 75 BPM | WEIGHT: 185.31 LBS | HEIGHT: 64 IN | BODY MASS INDEX: 31.64 KG/M2

## 2018-12-12 DIAGNOSIS — E66.9 OBESITY (BMI 30.0-34.9): ICD-10-CM

## 2018-12-12 DIAGNOSIS — Z95.1 S/P CABG X 2: ICD-10-CM

## 2018-12-12 DIAGNOSIS — I51.89 DIASTOLIC DYSFUNCTION: ICD-10-CM

## 2018-12-12 DIAGNOSIS — I34.0 SEVERE MITRAL REGURGITATION: ICD-10-CM

## 2018-12-12 DIAGNOSIS — I10 ESSENTIAL HYPERTENSION: ICD-10-CM

## 2018-12-12 DIAGNOSIS — I73.9 PAD (PERIPHERAL ARTERY DISEASE): ICD-10-CM

## 2018-12-12 DIAGNOSIS — I50.33 ACUTE ON CHRONIC DIASTOLIC HEART FAILURE: Primary | ICD-10-CM

## 2018-12-12 DIAGNOSIS — E78.2 MIXED HYPERLIPIDEMIA: ICD-10-CM

## 2018-12-12 DIAGNOSIS — Z95.2 S/P MVR (MITRAL VALVE REPLACEMENT): ICD-10-CM

## 2018-12-12 PROCEDURE — 99999 PR PBB SHADOW E&M-EST. PATIENT-LVL III: CPT | Mod: PBBFAC,,, | Performed by: INTERNAL MEDICINE

## 2018-12-12 PROCEDURE — 99214 OFFICE O/P EST MOD 30 MIN: CPT | Mod: S$GLB,,, | Performed by: INTERNAL MEDICINE

## 2018-12-12 NOTE — PROGRESS NOTES
Subjective:   Patient ID:  Tara Mondragon is a 73 y.o. female who presents for follow-up of No chief complaint on file.      Problem List Items Addressed This Visit        Cardiac/Vascular    PAD (peripheral artery disease)    S/P MVR (mitral valve replacement)    S/P CABG x 2    Hypertension    Hyperlipidemia    Diastolic dysfunction    Acute on chronic diastolic heart failure - Primary    Severe mitral regurgitation       Endocrine    Obesity (BMI 30.0-34.9)          HPI: Patient with the above medically present for f/u. She has visited with Dr. Jason who thought she is high risk for repeat surgery. He is thinking If patient decompensated they will try TAVR in mitral position. He also mentioned stent mitral valve study sometime next year. 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 She is down 5 lbs since last week.    Nuclear stress negative for ischemia. LINDA showed moderate MR with well seated mitral bioprosthetic valve. LHC showed patent LIMA and occluded SVG to PDA but patent native RCA. MR was however 4+/     She is s/p CABG and MVR in 2013 after presentation with CHF and NSTEMI.   Weight is down 7 lbs since previous visit.     Review of Systems   Constitution: Negative.   HENT: Negative.    Eyes: Negative.    Cardiovascular: Negative.    Respiratory: Negative.    Endocrine: Negative.    Hematologic/Lymphatic: Negative.    Skin: Negative.    Musculoskeletal: Negative.    Gastrointestinal: Negative.    Neurological: Negative.           Medication List           Accurate as of 12/12/18 11:46 AM. If you have any questions, ask your nurse or doctor.               CONTINUE taking these medications    acetaminophen 325 MG tablet  Commonly known as:  TYLENOL     albuterol 90 mcg/actuation inhaler  Commonly known as:  VENTOLIN HFA  Inhale 2 puffs into the lungs every 6 (six) hours as needed for Wheezing or Shortness of Breath. Rescue     ALPRAZolam 0.25  MG tablet  Commonly known as:  XANAX  Take 1 tablet (0.25 mg total) by mouth nightly as needed for Anxiety.     amLODIPine 5 MG tablet  Commonly known as:  NORVASC  TAKE 1 TABLET BY MOUTH EVERY DAY     ammonium lactate 12 % Crea  Apply to feet twice daily. Avoid use between toes.     aspirin 81 MG EC tablet  Commonly known as:  ECOTRIN     atorvastatin 40 MG tablet  Commonly known as:  LIPITOR  TAKE 1 TABLET(40 MG) BY MOUTH EVERY DAY     cilostazol 50 MG Tab  Commonly known as:  PLETAL  Take 1 tablet (50 mg total) by mouth 2 (two) times daily.     furosemide 40 MG tablet  Commonly known as:  LASIX  Take 1 tablet (40 mg total) by mouth 2 (two) times daily.     guaiFENesin 600 mg 12 hr tablet  Commonly known as:  MUCINEX     metoprolol tartrate 50 MG tablet  Commonly known as:  LOPRESSOR  Take 1 tablet (50 mg total) by mouth every 6 (six) hours.     nitroGLYCERIN 0.4 MG SL tablet  Commonly known as:  NITROSTAT  Place 1 tablet (0.4 mg total) under the tongue every 5 (five) minutes as needed for Chest pain.     potassium chloride SA 20 MEQ tablet  Commonly known as:  K-DUR,KLOR-CON  Take 2 tablets (40 mEq total) by mouth 2 (two) times daily.     spironolactone 25 MG tablet  Commonly known as:  ALDACTONE  Take 1 tablet (25 mg total) by mouth once daily.     triamcinolone acetonide 0.1% 0.1 % ointment  Commonly known as:  KENALOG  Apply topically 2 (two) times daily.            Objective:   Physical Exam   Constitutional: She is oriented to person, place, and time. She appears well-developed and well-nourished. No distress.   Examination of the digits showed no clubbing or cyanosis   HENT:   Head: Normocephalic and atraumatic.   Eyes: Conjunctivae are normal. Pupils are equal, round, and reactive to light. Right eye exhibits no discharge.   Neck: Normal range of motion. Neck supple. No JVD present. No thyromegaly present.   No carotid bruits   Cardiovascular: Normal rate, regular rhythm, S1 normal, S2 normal and intact  distal pulses. PMI is not displaced. Exam reveals no gallop, no friction rub and no opening snap.   Murmur heard.  Pulses:       Popliteal pulses are 2+ on the right side, and 1+ on the left side.        Dorsalis pedis pulses are 2+ on the right side, and 0 on the left side.        Posterior tibial pulses are 2+ on the right side.   Pulmonary/Chest: Effort normal and breath sounds normal. No respiratory distress. She has no wheezes. She has no rales. She exhibits no tenderness.   Abdominal: Soft. Bowel sounds are normal. She exhibits no distension and no mass. There is no tenderness. There is no guarding.   No hepatosplenomegaly   Musculoskeletal: Normal range of motion. She exhibits no edema or tenderness.   Lymphadenopathy:     She has no cervical adenopathy.   Neurological: She is alert and oriented to person, place, and time.   Skin: Skin is warm. No rash noted. She is not diaphoretic. No erythema.   Psychiatric: She has a normal mood and affect.   Nursing note and vitals reviewed.      ECGs reviewed-NSR  LABS reviewed  Imaging including Echoes reviewed-normal ef with bioprosthetic mitral valve  LINDA- moderate to severe MR with mild AS    Assessment:     1. Acute on chronic diastolic heart failure    2. Severe mitral regurgitation    3. S/P MVR (mitral valve replacement)    4. S/P CABG x 2    5. Essential hypertension    6. Mixed hyperlipidemia    7. Diastolic dysfunction    8. PAD (peripheral artery disease)    9. Obesity (BMI 30.0-34.9)        Plan:     Decompensated diastolic CHF likely secondary to degraded bioprosthetic mitral valve. Now resolved with patient Euvolemic.   Severity was moderate on LINDA likely because of sedation. During LHC it was severe. MR is likely severe.   Take extra lasix 40 mg po if weight gain > 2 lbs in 1 day or 5 lbs in a week.   Continue current therapy  Continue exercise regimen  Ok to return to work. She is feeling well today. Walking more and not in wheel chair.   Low salt  diet  F/u in 2 months

## 2018-12-12 NOTE — LETTER
December 12, 2018      [facility]  [address], [address]  [place] [address]  Phone: [phone]       Patient: Tara Mondragon   YOB: 1945  Date of Visit: 12/12/2018    To Whom It May Concern:    Grace Mondragon  was at [facility] on 12/12/2018. She may return to work on 12/19/2018 with restrictions of no lifting and constant walking. If you have any questions or concerns, or if I can be of further assistance, please do not hesitate to contact me.    Sincerely,    Qing Duffy MD

## 2018-12-17 ENCOUNTER — OFFICE VISIT (OUTPATIENT)
Dept: CARDIOLOGY | Facility: CLINIC | Age: 73
End: 2018-12-17
Payer: COMMERCIAL

## 2018-12-17 ENCOUNTER — HOSPITAL ENCOUNTER (OUTPATIENT)
Dept: RADIOLOGY | Facility: HOSPITAL | Age: 73
Discharge: HOME | End: 2018-12-17
Attending: INTERNAL MEDICINE
Payer: COMMERCIAL

## 2018-12-17 VITALS
DIASTOLIC BLOOD PRESSURE: 54 MMHG | HEART RATE: 91 BPM | SYSTOLIC BLOOD PRESSURE: 107 MMHG | OXYGEN SATURATION: 95 % | HEIGHT: 65 IN | WEIGHT: 184.31 LBS | BODY MASS INDEX: 30.71 KG/M2

## 2018-12-17 DIAGNOSIS — I35.0 SEVERE AORTIC STENOSIS: ICD-10-CM

## 2018-12-17 DIAGNOSIS — I73.9 PAD (PERIPHERAL ARTERY DISEASE): ICD-10-CM

## 2018-12-17 DIAGNOSIS — I34.0 SEVERE MITRAL REGURGITATION: ICD-10-CM

## 2018-12-17 DIAGNOSIS — I10 ESSENTIAL HYPERTENSION: ICD-10-CM

## 2018-12-17 DIAGNOSIS — I50.33 ACUTE ON CHRONIC DIASTOLIC HEART FAILURE: ICD-10-CM

## 2018-12-17 DIAGNOSIS — I25.10 CORONARY ARTERY DISEASE INVOLVING NATIVE CORONARY ARTERY OF NATIVE HEART WITHOUT ANGINA PECTORIS: ICD-10-CM

## 2018-12-17 PROCEDURE — 74174 CTA ABD&PLVS W/CONTRAST: CPT | Mod: 26,,, | Performed by: RADIOLOGY

## 2018-12-17 PROCEDURE — 25500020 PHARM REV CODE 255: Performed by: INTERNAL MEDICINE

## 2018-12-17 PROCEDURE — 99999 PR PBB SHADOW E&M-EST. PATIENT-LVL IV: CPT | Mod: PBBFAC,,,

## 2018-12-17 PROCEDURE — 74174 CTA ABD&PLVS W/CONTRAST: CPT | Mod: TC

## 2018-12-17 PROCEDURE — 99215 OFFICE O/P EST HI 40 MIN: CPT | Mod: S$GLB,,, | Performed by: INTERNAL MEDICINE

## 2018-12-17 PROCEDURE — 71275 CT ANGIOGRAPHY CHEST: CPT | Mod: 26,,, | Performed by: RADIOLOGY

## 2018-12-17 RX ADMIN — IOHEXOL 100 ML: 350 INJECTION, SOLUTION INTRAVENOUS at 11:12

## 2018-12-17 NOTE — ASSESSMENT & PLAN NOTE
Failing 27mm Mosaic MVR with 4+ MR and LA dilation.   She has undergone the following TMVR work-up:  · S/p 27mm Mosaic MVR + CABG (Eren, 2013) - True ID = 22mm (OK for OS 23mm or US 26mm Alex S3)  · LINDA (11/19/18): Moderate MR, EF= 65%  · Coronary angiogram (11/28/18): Patent LIMA to LAD, occluded SVG to RCA, native RCA patent, LCx (separate ostia) patent with patent mid stent. Severe (4+) MR with LA dilation.  · Frailty: 2/4  · Rhythm issues: None  · PFTs: Severe Restriction, FEV1= 57.7% predicted, FVC= 63.5% predicted, DLCO = 42% predicted.   · MV per SRR: Area= 3.15 cm2, Avg Diam = 20 mm (21.1 x 19.7)  · Incidental CT findings: pending official read  · She is high risk per Dr. Jason due to debility and comorbidities.   · Comorbidities; Stage III CKD, PAD (occluded L mid SFA)

## 2018-12-17 NOTE — PROGRESS NOTES
Subjective:    Patient ID:  Tara Mondragon is a 73 y.o. female who presents for evaluation of Mitral Regurgitation    Referring: Dr. Bailey/Dr. Duffy    HPI  Ms. Mondragon is a very pleasant lady referred by Dr. Bailey and Dr. Duffy for evaluation of failing bioprosthetic mitral valve. She underwent MVR (27mm Mosaic MVR) + CABG (LIMA to LAD, SVG to RCA) by Dr. Jason in 2013. She did well until early November when she began to develop worsening HERNANDES. She was hospitalized at [facility] on 11/17 with decompensated heart failure. A LINDA at that time showed moderate MR. A coronary angiogram revealed patent LIMA to LAD and occluded SVG to RCA (native RCA patent) and severe MR with dilated LA. She was hospitalized again 11/23 for decompensated heart failure. She was subsequently referred back to Dr. Jason for consideration of redo MVR. He felt she was high risk and recommended she be evaluated for TMVR.     Currently, she becomes SOB ambulating more than 100 ft. She denies CP, PND, orthopnea, or LE edema.     She has undergone the following TMVR work-up:  · S/p 27mm Mosaic MVR + CABG (Eren, 2013) - True ID = 22mm (OK for OS 23mm or US 26mm Alex S3)  · LINDA (11/19/18): Moderate MR, EF= 65%  · Coronary angiogram (11/28/18): Patent LIMA to LAD, occluded SVG to RCA, native RCA patent, LCx (separate ostia) patent with patent mid stent. Severe (4+) MR with LA dilation.  · Frailty: 2/4  · Rhythm issues: None  · PFTs: Severe Restriction, FEV1= 57.7% predicted, FVC= 63.5% predicted, DLCO = 42% predicted.   · MV per SRR: Area= 3.15 cm2, Avg Diam = 20 mm (21.1 x 19.7)  · Incidental CT findings: pending official read  · She is high risk per Dr. Jason due to debility and comorbidities.   · Comorbidities; Stage III CKD, PAD (occluded L mid SFA)    Review of Systems   Constitution: Negative for chills, diaphoresis, fever, weakness, weight gain and weight loss.   HENT: Negative for sore throat.    Eyes: Negative for blurred  "vision, vision loss in left eye, vision loss in right eye and visual disturbance.   Cardiovascular: Positive for dyspnea on exertion. Negative for chest pain, claudication, leg swelling, near-syncope, orthopnea, palpitations, paroxysmal nocturnal dyspnea and syncope.   Respiratory: Negative for cough, hemoptysis, shortness of breath, sputum production and wheezing.    Endocrine: Negative for cold intolerance and heat intolerance.   Hematologic/Lymphatic: Negative for adenopathy. Does not bruise/bleed easily.   Skin: Negative for rash.   Musculoskeletal: Negative for falls, muscle weakness and myalgias.   Gastrointestinal: Negative for abdominal pain, change in bowel habit, constipation, diarrhea, melena and nausea.   Genitourinary: Negative for bladder incontinence.   Neurological: Negative for dizziness, focal weakness, headaches, light-headedness and numbness.   Psychiatric/Behavioral: Negative for altered mental status.         Vitals:    12/17/18 1306 12/17/18 1309   BP: (!) 109/56 (!) 107/54   BP Location: Right arm Left arm   Patient Position: Sitting Sitting   BP Method: Large (Automatic) Large (Automatic)   Pulse: 91 91   SpO2: 95%    Weight: 83.6 kg (184 lb 4.9 oz)    Height: 5' 4.57" (1.64 m)        Objective:    Physical Exam   Constitutional: She is oriented to person, place, and time. She appears well-developed and well-nourished. No distress.   HENT:   Head: Normocephalic and atraumatic.   Mouth/Throat: Oropharynx is clear and moist.   Eyes: Conjunctivae and EOM are normal. Pupils are equal, round, and reactive to light. No scleral icterus.   Neck: Neck supple. No JVD present. No tracheal deviation present.   Cardiovascular: Normal rate and regular rhythm. Exam reveals no gallop and no friction rub.   Murmur heard.  Pulmonary/Chest: Effort normal and breath sounds normal. No respiratory distress. She has no wheezes. She has no rales. She exhibits no tenderness.   Abdominal: Soft. Bowel sounds are " normal. She exhibits no distension. There is no hepatosplenomegaly. There is no tenderness.   Musculoskeletal: She exhibits no edema or tenderness.   Neurological: She is alert and oriented to person, place, and time.   Skin: Skin is warm and dry. No rash noted. No erythema.   Psychiatric: She has a normal mood and affect. Her behavior is normal.         Assessment:     Severe mitral regurgitation  Failing 27mm Mosaic MVR with 4+ MR and LA dilation.   She has undergone the following TMVR work-up:  · S/p 27mm Mosaic MVR + CABG (Eren, 2013) - True ID = 22mm (OK for OS 23mm or US 26mm Alex S3)  · LINDA (11/19/18): Moderate MR, EF= 65%  · Coronary angiogram (11/28/18): Patent LIMA to LAD, occluded SVG to RCA, native RCA patent, LCx (separate ostia) patent with patent mid stent. Severe (4+) MR with LA dilation.  · Frailty: 2/4  · Rhythm issues: None  · PFTs: Severe Restriction, FEV1= 57.7% predicted, FVC= 63.5% predicted, DLCO = 42% predicted.   · MV per SRR: Area= 3.15 cm2, Avg Diam = 20 mm (21.1 x 19.7)  · Incidental CT findings: pending official read  · She is high risk per Dr. Jason due to debility and comorbidities.   · Comorbidities; Stage III CKD, PAD (occluded L mid SFA)    CAD (coronary artery disease)  S/p CABG x 2 (LIMA to LAD and SVG to RCA)  Coronary angiogram (11/28/18): Patent LIMA to LAD, occluded SVG to RCA, native RCA patent, LCx (separate ostia) patent with patent mid stent.  On ASA, statin, and b-blocker.     PAD (peripheral artery disease)  Occluded L mid SFA by US.   Kong Class IIb sx.     Acute on chronic diastolic heart failure  Well compensated clinically at this time.   On Lasix daily.     Hypertension  Controlled on current regimen.        Plan:       Ms. Mondragon was offered TMVR (either transapical or transseptal).   She would like to discuss her options with her children and she will contact us if/when she wants to schedule.       Staff:  I have personally taken the history and  examined this patient and agree with the fellow's note as stated above and amended it accordingly :-) Gene and I agree that transcatheter mitral Ricardo is a reasonable option since she is high risk for redo MVR.  We have reviewed the CT scan and believe that the LVOT is not going to be compromised.  Yoshi wants to see the LINDA at the time of TMVR to know for sure.  He agrees that Transseptal is the optimal approach.

## 2018-12-17 NOTE — ASSESSMENT & PLAN NOTE
S/p CABG x 2 (LIMA to LAD and SVG to RCA)  Coronary angiogram (11/28/18): Patent LIMA to LAD, occluded SVG to RCA, native RCA patent, LCx (separate ostia) patent with patent mid stent.  On ASA, statin, and b-blocker.

## 2018-12-18 RX ORDER — METOPROLOL TARTRATE 50 MG/1
TABLET ORAL
Qty: 60 TABLET | Refills: 3 | OUTPATIENT
Start: 2018-12-18

## 2019-01-03 DIAGNOSIS — I34.0 MITRAL VALVE INSUFFICIENCY, UNSPECIFIED ETIOLOGY: Primary | ICD-10-CM

## 2019-01-03 DIAGNOSIS — I05.0 MITRAL VALVE STENOSIS, UNSPECIFIED ETIOLOGY: Primary | ICD-10-CM

## 2019-01-03 RX ORDER — DEXTROSE MONOHYDRATE AND SODIUM CHLORIDE 5; .45 G/100ML; G/100ML
INJECTION, SOLUTION INTRAVENOUS CONTINUOUS
Status: CANCELLED | OUTPATIENT
Start: 2019-01-03

## 2019-01-04 DIAGNOSIS — I10 ESSENTIAL HYPERTENSION: ICD-10-CM

## 2019-01-04 DIAGNOSIS — I51.89 DIASTOLIC DYSFUNCTION: ICD-10-CM

## 2019-01-04 NOTE — TELEPHONE ENCOUNTER
[facility] pharmacy faxed over refill request for pt   Spironolactone 25mg

## 2019-01-06 RX ORDER — SPIRONOLACTONE 25 MG/1
25 TABLET ORAL DAILY
Qty: 30 TABLET | Refills: 6 | Status: SHIPPED | OUTPATIENT
Start: 2019-01-06 | End: 2019-11-22 | Stop reason: SDUPTHER

## 2019-01-09 ENCOUNTER — OFFICE VISIT (OUTPATIENT)
Dept: CARDIOLOGY | Facility: CLINIC | Age: 74
End: 2019-01-09
Payer: COMMERCIAL

## 2019-01-09 VITALS
SYSTOLIC BLOOD PRESSURE: 98 MMHG | HEIGHT: 64 IN | DIASTOLIC BLOOD PRESSURE: 65 MMHG | WEIGHT: 174.38 LBS | HEART RATE: 92 BPM | BODY MASS INDEX: 29.77 KG/M2

## 2019-01-09 DIAGNOSIS — I34.0 SEVERE MITRAL REGURGITATION: ICD-10-CM

## 2019-01-09 DIAGNOSIS — Z95.1 S/P CABG X 2: ICD-10-CM

## 2019-01-09 DIAGNOSIS — I10 ESSENTIAL HYPERTENSION: ICD-10-CM

## 2019-01-09 DIAGNOSIS — Z95.2 S/P MVR (MITRAL VALVE REPLACEMENT): ICD-10-CM

## 2019-01-09 DIAGNOSIS — I51.89 LEFT VENTRICULAR DIASTOLIC DYSFUNCTION, NYHA CLASS 3: Primary | ICD-10-CM

## 2019-01-09 DIAGNOSIS — F41.9 ANXIETY: ICD-10-CM

## 2019-01-09 DIAGNOSIS — E78.2 MIXED HYPERLIPIDEMIA: ICD-10-CM

## 2019-01-09 PROCEDURE — 99999 PR PBB SHADOW E&M-EST. PATIENT-LVL III: CPT | Mod: PBBFAC,,, | Performed by: STUDENT IN AN ORGANIZED HEALTH CARE EDUCATION/TRAINING PROGRAM

## 2019-01-09 PROCEDURE — 99999 PR PBB SHADOW E&M-EST. PATIENT-LVL III: ICD-10-PCS | Mod: PBBFAC,,, | Performed by: STUDENT IN AN ORGANIZED HEALTH CARE EDUCATION/TRAINING PROGRAM

## 2019-01-09 PROCEDURE — 3074F SYST BP LT 130 MM HG: CPT | Mod: CPTII,S$GLB,, | Performed by: STUDENT IN AN ORGANIZED HEALTH CARE EDUCATION/TRAINING PROGRAM

## 2019-01-09 PROCEDURE — 1101F PR PT FALLS ASSESS DOC 0-1 FALLS W/OUT INJ PAST YR: ICD-10-PCS | Mod: CPTII,S$GLB,, | Performed by: STUDENT IN AN ORGANIZED HEALTH CARE EDUCATION/TRAINING PROGRAM

## 2019-01-09 PROCEDURE — 3078F DIAST BP <80 MM HG: CPT | Mod: CPTII,S$GLB,, | Performed by: STUDENT IN AN ORGANIZED HEALTH CARE EDUCATION/TRAINING PROGRAM

## 2019-01-09 PROCEDURE — 3074F PR MOST RECENT SYSTOLIC BLOOD PRESSURE < 130 MM HG: ICD-10-PCS | Mod: CPTII,S$GLB,, | Performed by: STUDENT IN AN ORGANIZED HEALTH CARE EDUCATION/TRAINING PROGRAM

## 2019-01-09 PROCEDURE — 99214 OFFICE O/P EST MOD 30 MIN: CPT | Mod: S$GLB,,, | Performed by: STUDENT IN AN ORGANIZED HEALTH CARE EDUCATION/TRAINING PROGRAM

## 2019-01-09 PROCEDURE — 99214 PR OFFICE/OUTPT VISIT, EST, LEVL IV, 30-39 MIN: ICD-10-PCS | Mod: S$GLB,,, | Performed by: STUDENT IN AN ORGANIZED HEALTH CARE EDUCATION/TRAINING PROGRAM

## 2019-01-09 PROCEDURE — 3078F PR MOST RECENT DIASTOLIC BLOOD PRESSURE < 80 MM HG: ICD-10-PCS | Mod: CPTII,S$GLB,, | Performed by: STUDENT IN AN ORGANIZED HEALTH CARE EDUCATION/TRAINING PROGRAM

## 2019-01-09 PROCEDURE — 1101F PT FALLS ASSESS-DOCD LE1/YR: CPT | Mod: CPTII,S$GLB,, | Performed by: STUDENT IN AN ORGANIZED HEALTH CARE EDUCATION/TRAINING PROGRAM

## 2019-01-09 RX ORDER — POTASSIUM CHLORIDE 20 MEQ/1
20 TABLET, EXTENDED RELEASE ORAL 2 TIMES DAILY
Qty: 60 TABLET | Refills: 3 | Status: SHIPPED | OUTPATIENT
Start: 2019-01-09 | End: 2019-01-21

## 2019-01-09 NOTE — PROGRESS NOTES
"   Cardiology Clinic note    Subjective:   Patient ID:  Tara Mondragon is a 73 y.o. female who presents for follow-up of severe mitral regurgitation    HPI:   Tara Mondragon  has a past medical history of Acute coronary syndrome, Cardiomyopathy, Coronary artery disease, Hypertension, Hyperthyroidism, Osteoporosis, PAD (peripheral artery disease), and Valvular regurgitation.  Here for follow up of symptomatic severe mitral regurgitation. Previous seen by Dr. Bailey and Dr. Duffy. Now getting eval by Dr. Faria for TMVR for evaluation of failing bioprosthetic mitral valve.  See Dr. Faria clinic note.  She presents for routine cardiology follow up. She notes questions lightheadedness. No change in vision. She regrets not getting home O2, asks to requalify. Compliant with home medications. Planned for TMVR on 1/22/19.    Vitals  Vitals:    01/09/19 1524   BP: 98/65   Pulse: 92   Weight: 79.1 kg (174 lb 6.4 oz)   Height: 5' 4" (1.626 m)       Patient Active Problem List    Diagnosis Date Noted    Severe mitral regurgitation 12/04/2018    Congestive heart failure 11/26/2018    Shortness of breath 11/23/2018    Acute on chronic diastolic heart failure 11/23/2018    HERNANDES (dyspnea on exertion) 11/16/2018    Obesity (BMI 30.0-34.9) 05/25/2017    Diastolic dysfunction 01/17/2017    Acute coronary syndrome     Hyperlipidemia 07/30/2013    Hyperthyroidism 07/30/2013    Valvular regurgitation      Mild/Mod MR post MI      Hypertension     Disorder of arteries and arterioles 03/05/2013    Hyperglycemia 02/27/2013    S/P MVR (mitral valve replacement) 02/27/2013    S/P CABG x 2 02/27/2013    Cardiomyopathy     CAD (coronary artery disease) 02/16/2013    Cardiomyopathy, secondary 02/16/2013    Left ventricular diastolic dysfunction, NYHA class 3 02/16/2013    PAD (peripheral artery disease) 07/10/2012          Medication List           Accurate as of 1/9/19 11:59 PM. If you have any questions, ask your nurse or " doctor.               CHANGE how you take these medications    ALPRAZolam 0.25 MG tablet  Commonly known as:  XANAX  TAKE 1 TABLET BY MOUTH EVERY EVENING AS NEEDED FOR ANXIETY  What changed:    · when to take this  · reasons to take this  Changed by:  Jaun Mata MD     potassium chloride SA 20 MEQ tablet  Commonly known as:  K-DUR,KLOR-CON  Take 1 tablet (20 mEq total) by mouth 2 (two) times daily.  What changed:  how much to take  Changed by:  William Quiroz MD        CONTINUE taking these medications    acetaminophen 325 MG tablet  Commonly known as:  TYLENOL     albuterol 90 mcg/actuation inhaler  Commonly known as:  VENTOLIN HFA  Inhale 2 puffs into the lungs every 6 (six) hours as needed for Wheezing or Shortness of Breath. Rescue     ammonium lactate 12 % Crea  Apply to feet twice daily. Avoid use between toes.     aspirin 81 MG EC tablet  Commonly known as:  ECOTRIN     atorvastatin 40 MG tablet  Commonly known as:  LIPITOR  TAKE 1 TABLET(40 MG) BY MOUTH EVERY DAY     cilostazol 50 MG Tab  Commonly known as:  PLETAL  Take 1 tablet (50 mg total) by mouth 2 (two) times daily.     furosemide 40 MG tablet  Commonly known as:  LASIX  Take 1 tablet (40 mg total) by mouth 2 (two) times daily.     guaiFENesin 600 mg 12 hr tablet  Commonly known as:  MUCINEX     metoprolol tartrate 50 MG tablet  Commonly known as:  LOPRESSOR  Take 1 tablet (50 mg total) by mouth every 6 (six) hours.     nitroGLYCERIN 0.4 MG SL tablet  Commonly known as:  NITROSTAT  Place 1 tablet (0.4 mg total) under the tongue every 5 (five) minutes as needed for Chest pain.     spironolactone 25 MG tablet  Commonly known as:  ALDACTONE  Take 1 tablet (25 mg total) by mouth once daily.     triamcinolone acetonide 0.1% 0.1 % ointment  Commonly known as:  KENALOG  Apply topically 2 (two) times daily.        STOP taking these medications    amLODIPine 5 MG tablet  Commonly known as:  NORVASC  Stopped by:  William Quiroz MD           Where to Get  Your Medications      These medications were sent to [id number] - [place] - [address] AT [facility] [address]  [address] [address]    Hours:  24-hours Phone:  [phone]   · potassium chloride SA 20 MEQ tablet     You can get these medications from any pharmacy    Bring a paper prescription for each of these medications  · ALPRAZolam 0.25 MG tablet           Review of Systems   Constitution: Negative for chills, decreased appetite, weakness, malaise/fatigue and weight gain.   HENT: Negative for congestion and ear discharge.    Eyes: Negative for blurred vision and double vision.   Cardiovascular: Positive for dyspnea on exertion. Negative for chest pain, cyanosis, irregular heartbeat, leg swelling, near-syncope, orthopnea, palpitations and syncope.   Respiratory: Positive for shortness of breath. Negative for cough and sleep disturbances due to breathing.    Skin: Negative for color change and dry skin.   Musculoskeletal: Negative for joint pain, joint swelling and muscle cramps.   Gastrointestinal: Negative for bloating, heartburn, hematemesis and hematochezia.   Genitourinary: Negative for bladder incontinence and dysuria.   Neurological: Negative for aphonia, excessive daytime sleepiness, dizziness, focal weakness, headaches, light-headedness and loss of balance.   Psychiatric/Behavioral: Negative for altered mental status, depression and memory loss. The patient does not have insomnia and is not nervous/anxious.          Objective:   Physical Exam   Constitutional: She is oriented to person, place, and time. She appears well-developed and well-nourished.   HENT:   Head: Normocephalic and atraumatic.   Eyes: Conjunctivae and EOM are normal.   Neck: Normal range of motion. Neck supple. No JVD present.   Cardiovascular: Normal rate, regular rhythm and normal heart sounds.   No murmur heard.  Pulmonary/Chest: Effort normal and breath sounds normal. No  respiratory distress. She has no wheezes. She has no rales.   Abdominal: Soft. Bowel sounds are normal. She exhibits no distension.   Musculoskeletal: Normal range of motion. She exhibits no edema.   Neurological: She is alert and oriented to person, place, and time.   Skin: Skin is warm and dry. No erythema.   Psychiatric: She has a normal mood and affect. Her behavior is normal. Judgment and thought content normal.   Nursing note and vitals reviewed.      Lab Results    Lab Results   Component Value Date     (L) 12/17/2018    K 4.5 12/17/2018    CL 98 12/17/2018    CO2 26 12/17/2018    BUN 24 (H) 12/17/2018    CREATININE 1.4 12/17/2018     (H) 12/17/2018    HGBA1C 5.5 09/30/2014    MG 2.2 11/29/2018    AST 20 11/23/2018    ALT 22 11/23/2018    ALBUMIN 3.1 (L) 12/17/2018    PROT 6.8 11/23/2018    BILITOT 1.3 (H) 11/23/2018    WBC 9.25 12/17/2018    HGB 10.7 (L) 12/17/2018    HCT 32.2 (L) 12/17/2018    HCT 35 (L) 02/27/2013    MCV 91 12/17/2018     12/17/2018    INR 1.0 11/17/2018    TSH 0.878 04/12/2018    CHOL 106 (L) 05/27/2017    HDL 46 05/27/2017    LDLCALC 47.8 (L) 05/27/2017    TRIG 61 05/27/2017     (H) 11/25/2018       Lipid panel  Lab Results   Component Value Date    CHOL 106 (L) 05/27/2017     Lab Results   Component Value Date    HDL 46 05/27/2017     Lab Results   Component Value Date    LDLCALC 47.8 (L) 05/27/2017     Lab Results   Component Value Date    TRIG 61 05/27/2017       Cardiac Studies  Significant Imaging: Echocardiogram:   2D echo with color flow doppler:   Results for orders placed or performed during the hospital encounter of 04/30/15   2D echo with color flow doppler   Result Value Ref Range    QEF 55 55 - 65    and Transthoracic echo (TTE) complete (Cupid Only):   Results for orders placed or performed during the hospital encounter of 11/17/18   Transthoracic echo (TTE) complete (Cupid Only)   Result Value Ref Range    BSA 1.95 m2    LA WIDTH 3.80 cm    LVIDD  4.45 3.5 - 6.0 cm    IVS 1.19 (A) 0.6 - 1.1 cm    PW 1.25 (A) 0.6 - 1.1 cm    Ao root annulus 2.67 cm    LVIDS 2.38 2.1 - 4.0 cm    FS 47 28 - 44 %    LA volume 70.05 cm3    LV mass 199.42 g    LA size 4.49 cm    RVDD 2.59 cm    Left Ventricle Relative Wall Thickness 0.56 cm    AV mean gradient 27 mmHg    AV valve area 1.13 cm2    E/A ratio 1.28     E wave decelartion time 220.46 msec    LVOT diameter 1.90 cm    LVOT area 2.83 cm2    LVOT peak VTI 29.83 cm    Ao peak tristan 2.83 m/s    Ao VTI 75.00 cm    LVOT stroke volume 84.53 cm3    AV peak gradient 32.04 mmHg    MV Peak E Tristan 2.16 m/s    TR Max Tristan 3.72 m/s    MV Peak A Tristan 1.69 m/s    LV Systolic Volume 19.69 mL    LV Systolic Volume Index 10.1 mL/m2    LV Diastolic Volume 90.13 mL    LV Diastolic Volume Index 46.22 mL/m2    LA Volume Index 35.9 mL/m2    LV Mass Index 102.3 g/m2    RA Major Axis 3.93 cm    Left Atrium Minor Axis 4.83 cm    Left Atrium Major Axis 4.83 cm    Triscuspid Valve Regurgitation Peak Gradient 55.35 mmHg    Right Atrial Pressure (from IVC) 3 mmHg    MV mean gradient 8 mmHg    MV valve area p 1/2 method 2.97 cm2    TV rest pulmonary artery pressure 58.35 mmHg    MV stenosis pressure 1/2 time 74 ms     ECG: Normal sinus rhythm  anteroseptal T wave changes, consider ischemia     · Cath study : LVEDP 18  · EF 65%  · Severe (4+) mitral regurgitation with severe LA dilation.  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  · Patent LIMA-LAD, occluded SVG-RCA  · Native RCA patent with mild to moderate disease  · Separate ostia of LAD and LCX and LCX native circulation patent with patent mid stent  RHC pressures: RA 8/7 (4), RV 61/4, RPAP 60/18, PWP 21/48 (26), SUZIE CO 4.73, SUZIE CI 2.43    Assessment:     1. Left ventricular diastolic dysfunction, NYHA class 3    2. Severe mitral regurgitation    3. S/P CABG x 2    4. S/P MVR (mitral valve replacement)    5. Essential hypertension    6. Mixed hyperlipidemia        Plan:     1. Severe symptomatic mitral  valve regurgitation  Plan for MAVR on 1/22 with Dr. Faria  - looks to euvolemic. Consulted on trying to maintain stable weights  - still notes moderate HERNANDES, agree with outpt Home O2 eval.    2.? Of symptomatic hypotension, lightheadedness  - will hold norvasc  - allow for SBP > 100    3. HLD  Statin    4. CAD: s/p 2v CABG  Stable    5. HTN  - well controlled  - will c/w aldactone, lasix, lopressor for now.    Continue with current medical plan and lifestyle changes.  Return sooner for concerns or questions. If symptoms persist go to the ED    Orders Placed This Encounter   Procedures    Six Minute Walk Test to qualify for Home Oxygen     eval for home oxygen.     Standing Status:   Future     Standing Expiration Date:   1/9/2020       Follow up as scheduled. Return to clinic in 4-6 weeks s/p MVR   She expressed verbal understanding and agreed with the plan    Thank you for the opportunity to care for this patient. Will be available for questions if needed.     William Quiroz MD [facility]  Interventional Cardiology  [facility]  Pager: [phone]

## 2019-01-10 RX ORDER — ALPRAZOLAM 0.25 MG/1
TABLET ORAL
Qty: 30 TABLET | Refills: 0 | Status: SHIPPED | OUTPATIENT
Start: 2019-01-10 | End: 2019-06-27 | Stop reason: SDUPTHER

## 2019-01-21 ENCOUNTER — OFFICE VISIT (OUTPATIENT)
Dept: CARDIOLOGY | Facility: CLINIC | Age: 74
End: 2019-01-21
Payer: COMMERCIAL

## 2019-01-21 ENCOUNTER — ANESTHESIA EVENT (OUTPATIENT)
Dept: MEDSURG UNIT | Facility: HOSPITAL | Age: 74
DRG: 219 | End: 2019-01-21
Payer: MEDICARE

## 2019-01-21 VITALS
SYSTOLIC BLOOD PRESSURE: 75 MMHG | WEIGHT: 173.75 LBS | HEIGHT: 64 IN | OXYGEN SATURATION: 87 % | DIASTOLIC BLOOD PRESSURE: 50 MMHG | BODY MASS INDEX: 29.66 KG/M2 | HEART RATE: 92 BPM

## 2019-01-21 DIAGNOSIS — I25.10 CORONARY ARTERY DISEASE INVOLVING NATIVE CORONARY ARTERY OF NATIVE HEART WITHOUT ANGINA PECTORIS: ICD-10-CM

## 2019-01-21 DIAGNOSIS — I34.0 SEVERE MITRAL REGURGITATION: ICD-10-CM

## 2019-01-21 DIAGNOSIS — I10 ESSENTIAL HYPERTENSION: ICD-10-CM

## 2019-01-21 DIAGNOSIS — I73.9 PAD (PERIPHERAL ARTERY DISEASE): ICD-10-CM

## 2019-01-21 DIAGNOSIS — N18.4 STAGE 4 CHRONIC KIDNEY DISEASE: ICD-10-CM

## 2019-01-21 DIAGNOSIS — I50.32 CHRONIC DIASTOLIC HEART FAILURE: ICD-10-CM

## 2019-01-21 PROBLEM — N18.30 STAGE 3 CHRONIC KIDNEY DISEASE: Status: ACTIVE | Noted: 2019-01-21

## 2019-01-21 PROCEDURE — 99214 PR OFFICE/OUTPT VISIT, EST, LEVL IV, 30-39 MIN: ICD-10-PCS | Mod: S$GLB,,, | Performed by: INTERNAL MEDICINE

## 2019-01-21 PROCEDURE — 99999 PR PBB SHADOW E&M-EST. PATIENT-LVL IV: CPT | Mod: PBBFAC,,, | Performed by: INTERNAL MEDICINE

## 2019-01-21 PROCEDURE — 3074F SYST BP LT 130 MM HG: CPT | Mod: CPTII,S$GLB,, | Performed by: INTERNAL MEDICINE

## 2019-01-21 PROCEDURE — 3074F PR MOST RECENT SYSTOLIC BLOOD PRESSURE < 130 MM HG: ICD-10-PCS | Mod: CPTII,S$GLB,, | Performed by: INTERNAL MEDICINE

## 2019-01-21 PROCEDURE — 3078F PR MOST RECENT DIASTOLIC BLOOD PRESSURE < 80 MM HG: ICD-10-PCS | Mod: CPTII,S$GLB,, | Performed by: INTERNAL MEDICINE

## 2019-01-21 PROCEDURE — 1101F PR PT FALLS ASSESS DOC 0-1 FALLS W/OUT INJ PAST YR: ICD-10-PCS | Mod: CPTII,S$GLB,, | Performed by: INTERNAL MEDICINE

## 2019-01-21 PROCEDURE — 99214 OFFICE O/P EST MOD 30 MIN: CPT | Mod: S$GLB,,, | Performed by: INTERNAL MEDICINE

## 2019-01-21 PROCEDURE — 3078F DIAST BP <80 MM HG: CPT | Mod: CPTII,S$GLB,, | Performed by: INTERNAL MEDICINE

## 2019-01-21 PROCEDURE — 99999 PR PBB SHADOW E&M-EST. PATIENT-LVL IV: ICD-10-PCS | Mod: PBBFAC,,, | Performed by: INTERNAL MEDICINE

## 2019-01-21 PROCEDURE — 1101F PT FALLS ASSESS-DOCD LE1/YR: CPT | Mod: CPTII,S$GLB,, | Performed by: INTERNAL MEDICINE

## 2019-01-21 RX ORDER — POTASSIUM CHLORIDE 750 MG/1
20 CAPSULE, EXTENDED RELEASE ORAL 2 TIMES DAILY
Qty: 60 CAPSULE | Refills: 11 | Status: SHIPPED | OUTPATIENT
Start: 2019-01-21

## 2019-01-21 NOTE — H&P (VIEW-ONLY)
Subjective:    Patient ID:  Tara Mondragon is a 73 y.o. female who presents for evaluation of Mitral Regurgitation (SOB)    Referring: Dr. Bailey/Dr. Qing Duffy    HPI  Ms. Mondragon is a very pleasant lady referred by Dr. Bailey and Dr. Duffy for evaluation of failing bioprosthetic mitral valve. She underwent MVR (27mm Mosaic MVR) + CABG (LIMA to LAD, SVG to RCA) by Dr. Jason in 2013. She did well until early November when she began to develop worsening HERNANDES. She was hospitalized at [facility] on 11/17 with decompensated heart failure. A LINDA at that time showed moderate MR. A coronary angiogram revealed patent LIMA to LAD and occluded SVG to RCA (native RCA patent) and severe MR with dilated LA. She was hospitalized again 11/23 for decompensated heart failure. She was subsequently referred back to Dr. Jason for consideration of redo MVR. He felt she was high risk and recommended she be evaluated for TMVR.     Currently, she becomes SOB ambulating more than 100 ft. She denies CP, PND, orthopnea, or LE edema.     She has undergone the following TMVR work-up:  · S/p 27mm Mosaic MVR + CABG (Eren, 2013) - True ID = 22mm (OK for OS 23mm or US 26mm Alex S3)  · LINDA (11/19/18): Moderate MR, EF= 65%  · Coronary angiogram (11/28/18): Patent LIMA to LAD, occluded SVG to RCA, native RCA patent, LCx (separate ostia) patent with patent mid stent. Severe (4+) MR with LA dilation.  · Frailty: 2/4  · Rhythm issues: None  · PFTs: Severe Restriction, FEV1= 57.7% predicted, FVC= 63.5% predicted, DLCO = 42% predicted.   · MV per SRR: Area= 3.15 cm2, Avg Diam = 20 mm (21.1 x 19.7)  · Incidental CT findings: pending official read  · She is high risk per Dr. Jason due to debility and comorbidities.   · Comorbidities; Stage III CKD, PAD (occluded L mid SFA)    OK for 23 or 26mm Alex S3 Valve-in-Valve TMVR via transseptal access.    Review of Systems   Constitution: Negative for chills, diaphoresis, fever, weakness, weight  "gain and weight loss.   HENT: Negative for sore throat.    Eyes: Negative for blurred vision, vision loss in left eye, vision loss in right eye and visual disturbance.   Cardiovascular: Positive for dyspnea on exertion. Negative for chest pain, claudication, leg swelling, near-syncope, orthopnea, palpitations, paroxysmal nocturnal dyspnea and syncope.   Respiratory: Negative for cough, hemoptysis, shortness of breath, sputum production and wheezing.    Endocrine: Negative for cold intolerance and heat intolerance.   Hematologic/Lymphatic: Negative for adenopathy. Does not bruise/bleed easily.   Skin: Negative for rash.   Musculoskeletal: Negative for falls, muscle weakness and myalgias.   Gastrointestinal: Negative for abdominal pain, change in bowel habit, constipation, diarrhea, melena and nausea.   Genitourinary: Negative for bladder incontinence.   Neurological: Negative for dizziness, focal weakness, headaches, light-headedness and numbness.   Psychiatric/Behavioral: Negative for altered mental status.         Vitals:    01/21/19 1009 01/21/19 1013   BP: (!) 85/52 (!) 75/50   BP Location: Left arm Right arm   Patient Position: Sitting Sitting   BP Method: Large (Automatic) Large (Automatic)   Pulse: 90 92   SpO2: (!) 87%    Weight: 78.8 kg (173 lb 11.6 oz)    Height: 5' 4" (1.626 m)        Objective:    Physical Exam   Constitutional: She is oriented to person, place, and time. She appears well-developed and well-nourished. No distress.   HENT:   Head: Normocephalic and atraumatic.   Mouth/Throat: Oropharynx is clear and moist.   Eyes: Conjunctivae and EOM are normal. Pupils are equal, round, and reactive to light. No scleral icterus.   Neck: Neck supple. No JVD present. No tracheal deviation present.   Cardiovascular: Normal rate and regular rhythm. Exam reveals no gallop and no friction rub.   Murmur heard.  Pulmonary/Chest: Effort normal and breath sounds normal. No respiratory distress. She has no wheezes. " She has no rales. She exhibits no tenderness.   Abdominal: Soft. Bowel sounds are normal. She exhibits no distension. There is no hepatosplenomegaly. There is no tenderness.   Musculoskeletal: She exhibits no edema or tenderness.   Neurological: She is alert and oriented to person, place, and time.   Skin: Skin is warm and dry. No rash noted. No erythema.   Psychiatric: She has a normal mood and affect. Her behavior is normal.         Assessment:     Severe mitral regurgitation  Failing 27mm Mosaic MVR with 4+ MR and LA dilation.   She has undergone the following TMVR work-up:  · S/p 27mm Mosaic MVR + CABG (Eren, 2013) - True ID = 22mm (OK for OS 23mm or US 26mm Alex S3)  · LINDA (11/19/18): Moderate MR, EF= 65%  · Coronary angiogram (11/28/18): Patent LIMA to LAD, occluded SVG to RCA, native RCA patent, LCx (separate ostia) patent with patent mid stent. Severe (4+) MR with LA dilation.  · Frailty: 2/4  · Rhythm issues: None  · PFTs: Severe Restriction, FEV1= 57.7% predicted, FVC= 63.5% predicted, DLCO = 42% predicted.   · MV per SRR: Area= 3.15 cm2, Avg Diam = 20 mm (21.1 x 19.7)  · Incidental CT findings: pending official read  · She is high risk per Dr. Jason due to debility and comorbidities.   · Comorbidities; Stage III CKD, PAD (occluded L mid SFA)    Chronic diastolic heart failure  Well compensated clinically at this time.   On Lasix and aldactone daily.     CAD (coronary artery disease)  S/p CABG x 2 (LIMA to LAD and SVG to RCA)  Coronary angiogram (11/28/18): Patent LIMA to LAD, occluded SVG to RCA, native RCA patent, LCx (separate ostia) patent with patent mid stent.  On ASA, statin, and b-blocker.     PAD (peripheral artery disease)  Occluded L mid SFA by US.   On Pletal.   Kong Class IIb sx.     Hypertension  Controlled on current regimen.     Stage 4 chronic kidney disease  Creatinine 1.8, GFR 27       Plan:       Plan for 23 or 26mm Alex S3 Valve-in-Valve TMVR via transseptal access in AM.    Will review LINDA at time of TMV to ensure LVOT will not be compromised.   Risks, Benefits, and alternatives of TMVR were discussed in detail with the patient. Questions were answered. She is agreeable to proceed. Informed consent obtained.   On ASA alone as pt is allergic to Plavix. Will start Coumadin after the procedure.   Hemolysis labs to be checked today. Will recheck them at 1 month f/u visit as well.     Staff:  I have personally taken the history and examined this patient and agree with the fellow's note as stated above and amended it accordingly :-) Since she is high risk for surgical redo MVR, the heart team has decided than transptal TVMR with a Alex valve is the best option.    ADDENDUM:  Ms. Mondragon's labs do show hemolysis-- Haptoglobin <10, Retic 8, .   Same plan as above.

## 2019-01-22 ENCOUNTER — ANESTHESIA (OUTPATIENT)
Dept: MEDSURG UNIT | Facility: HOSPITAL | Age: 74
DRG: 219 | End: 2019-01-22
Payer: COMMERCIAL

## 2019-01-22 ENCOUNTER — HOSPITAL ENCOUNTER (INPATIENT)
Facility: HOSPITAL | Age: 74
LOS: 4 days | Discharge: HOME | DRG: 219 | End: 2019-01-26
Attending: INTERNAL MEDICINE | Admitting: INTERNAL MEDICINE
Payer: COMMERCIAL

## 2019-01-22 DIAGNOSIS — I50.33 ACUTE ON CHRONIC DIASTOLIC (CONGESTIVE) HEART FAILURE: ICD-10-CM

## 2019-01-22 DIAGNOSIS — Z98.890 S/P MVR (MITRAL VALVE REPAIR): ICD-10-CM

## 2019-01-22 DIAGNOSIS — I34.0 NON-RHEUMATIC MITRAL REGURGITATION: Primary | ICD-10-CM

## 2019-01-22 DIAGNOSIS — I05.0 MITRAL VALVE STENOSIS: ICD-10-CM

## 2019-01-22 DIAGNOSIS — I05.0 MITRAL VALVE STENOSIS, UNSPECIFIED ETIOLOGY: ICD-10-CM

## 2019-01-22 DIAGNOSIS — Z95.2 S/P MVR (MITRAL VALVE REPLACEMENT): Primary | ICD-10-CM

## 2019-01-22 DIAGNOSIS — I38 VALVULAR REGURGITATION: ICD-10-CM

## 2019-01-22 DIAGNOSIS — I50.33 ACUTE ON CHRONIC DIASTOLIC HEART FAILURE: ICD-10-CM

## 2019-01-22 DIAGNOSIS — R06.02 SHORTNESS OF BREATH: ICD-10-CM

## 2019-01-22 DIAGNOSIS — I05.9 MITRAL VALVE DISEASE: Primary | ICD-10-CM

## 2019-01-22 DIAGNOSIS — R06.09 DOE (DYSPNEA ON EXERTION): ICD-10-CM

## 2019-01-22 LAB
ABO + RH BLD: NORMAL
BLD GP AB SCN CELLS X3 SERPL QL: NORMAL
BSA FOR ECHO PROCEDURE: 1.88 M2
INR PPP: 1.1
PROTHROMBIN TIME: 11.5 SEC

## 2019-01-22 PROCEDURE — D9220A PRA ANESTHESIA: ICD-10-PCS | Mod: ,,, | Performed by: ANESTHESIOLOGY

## 2019-01-22 PROCEDURE — 93010 EKG 12-LEAD: ICD-10-PCS | Mod: ,,, | Performed by: INTERNAL MEDICINE

## 2019-01-22 PROCEDURE — 93010 ELECTROCARDIOGRAM REPORT: CPT | Mod: 76,,, | Performed by: INTERNAL MEDICINE

## 2019-01-22 PROCEDURE — C1725 CATH, TRANSLUMIN NON-LASER: HCPCS | Performed by: INTERNAL MEDICINE

## 2019-01-22 PROCEDURE — 27000221 HC OXYGEN, UP TO 24 HOURS

## 2019-01-22 PROCEDURE — 51701 INSERT BLADDER CATHETER: CPT

## 2019-01-22 PROCEDURE — 36620 INSERTION CATHETER ARTERY: CPT | Mod: 59,,, | Performed by: ANESTHESIOLOGY

## 2019-01-22 PROCEDURE — 0483T TMVI PERCUTANEOUS APPROACH: CPT | Mod: 62,,, | Performed by: INTERNAL MEDICINE

## 2019-01-22 PROCEDURE — 0483T: ICD-10-PCS | Mod: 62,,, | Performed by: INTERNAL MEDICINE

## 2019-01-22 PROCEDURE — 27000191 HC C-V MONITORING

## 2019-01-22 PROCEDURE — 86901 BLOOD TYPING SEROLOGIC RH(D): CPT

## 2019-01-22 PROCEDURE — 0483T TMVI PERCUTANEOUS APPROACH: CPT | Mod: 62,,, | Performed by: THORACIC SURGERY (CARDIOTHORACIC VASCULAR SURGERY)

## 2019-01-22 PROCEDURE — C1769 GUIDE WIRE: HCPCS | Performed by: INTERNAL MEDICINE

## 2019-01-22 PROCEDURE — C1894 INTRO/SHEATH, NON-LASER: HCPCS | Performed by: INTERNAL MEDICINE

## 2019-01-22 PROCEDURE — 63600175 PHARM REV CODE 636 W HCPCS: Performed by: ANESTHESIOLOGY

## 2019-01-22 PROCEDURE — 36620 ARTERIAL: ICD-10-PCS | Mod: 59,,, | Performed by: ANESTHESIOLOGY

## 2019-01-22 PROCEDURE — C1766 INTRO/SHEATH,STRBLE,NON-PEEL: HCPCS | Performed by: INTERNAL MEDICINE

## 2019-01-22 PROCEDURE — 20000000 HC ICU ROOM

## 2019-01-22 PROCEDURE — 0483T TMVI PERCUTANEOUS APPROACH: CPT | Performed by: INTERNAL MEDICINE

## 2019-01-22 PROCEDURE — 93005 ELECTROCARDIOGRAM TRACING: CPT

## 2019-01-22 PROCEDURE — 27201423 OPTIME MED/SURG SUP & DEVICES STERILE SUPPLY: Performed by: INTERNAL MEDICINE

## 2019-01-22 PROCEDURE — 25000003 PHARM REV CODE 250: Performed by: STUDENT IN AN ORGANIZED HEALTH CARE EDUCATION/TRAINING PROGRAM

## 2019-01-22 PROCEDURE — 0483T: ICD-10-PCS | Mod: 62,,, | Performed by: THORACIC SURGERY (CARDIOTHORACIC VASCULAR SURGERY)

## 2019-01-22 PROCEDURE — 85610 PROTHROMBIN TIME: CPT

## 2019-01-22 PROCEDURE — D9220A PRA ANESTHESIA: Mod: ,,, | Performed by: ANESTHESIOLOGY

## 2019-01-22 PROCEDURE — 63600175 PHARM REV CODE 636 W HCPCS: Performed by: STUDENT IN AN ORGANIZED HEALTH CARE EDUCATION/TRAINING PROGRAM

## 2019-01-22 PROCEDURE — C1760 CLOSURE DEV, VASC: HCPCS | Performed by: INTERNAL MEDICINE

## 2019-01-22 PROCEDURE — 27800903 OPTIME MED/SURG SUP & DEVICES OTHER IMPLANTS: Performed by: INTERNAL MEDICINE

## 2019-01-22 PROCEDURE — 25000003 PHARM REV CODE 250: Performed by: INTERNAL MEDICINE

## 2019-01-22 PROCEDURE — 25000003 PHARM REV CODE 250: Performed by: ANESTHESIOLOGY

## 2019-01-22 PROCEDURE — 63600175 PHARM REV CODE 636 W HCPCS: Performed by: INTERNAL MEDICINE

## 2019-01-22 PROCEDURE — 25500020 PHARM REV CODE 255: Performed by: INTERNAL MEDICINE

## 2019-01-22 PROCEDURE — 37000008 HC ANESTHESIA 1ST 15 MINUTES: Performed by: INTERNAL MEDICINE

## 2019-01-22 PROCEDURE — 36415 COLL VENOUS BLD VENIPUNCTURE: CPT

## 2019-01-22 PROCEDURE — 85347 COAGULATION TIME ACTIVATED: CPT | Performed by: INTERNAL MEDICINE

## 2019-01-22 PROCEDURE — 37000009 HC ANESTHESIA EA ADD 15 MINS: Performed by: INTERNAL MEDICINE

## 2019-01-22 PROCEDURE — 25000003 PHARM REV CODE 250: Performed by: PHYSICIAN ASSISTANT

## 2019-01-22 PROCEDURE — 27000239 HC STAND-BY BYPASS PUMP

## 2019-01-22 PROCEDURE — 93010 ELECTROCARDIOGRAM REPORT: CPT | Mod: ,,, | Performed by: INTERNAL MEDICINE

## 2019-01-22 PROCEDURE — 94761 N-INVAS EAR/PLS OXIMETRY MLT: CPT

## 2019-01-22 DEVICE — KIT SAPIEN 26MM DELIVERY: Type: IMPLANTABLE DEVICE | Site: HEART | Status: FUNCTIONAL

## 2019-01-22 RX ORDER — NOREPINEPHRINE BITARTRATE/D5W 4MG/250ML
0.02 PLASTIC BAG, INJECTION (ML) INTRAVENOUS CONTINUOUS
Status: DISCONTINUED | OUTPATIENT
Start: 2019-01-22 | End: 2019-01-22

## 2019-01-22 RX ORDER — SODIUM CHLORIDE 9 MG/ML
INJECTION, SOLUTION INTRAVENOUS CONTINUOUS
Status: ACTIVE | OUTPATIENT
Start: 2019-01-22 | End: 2019-01-22

## 2019-01-22 RX ORDER — LIDOCAINE HYDROCHLORIDE 20 MG/ML
INJECTION, SOLUTION EPIDURAL; INFILTRATION; INTRACAUDAL; PERINEURAL
Status: DISCONTINUED | OUTPATIENT
Start: 2019-01-22 | End: 2019-01-22 | Stop reason: HOSPADM

## 2019-01-22 RX ORDER — FENTANYL CITRATE 50 UG/ML
INJECTION, SOLUTION INTRAMUSCULAR; INTRAVENOUS
Status: DISCONTINUED | OUTPATIENT
Start: 2019-01-22 | End: 2019-01-22

## 2019-01-22 RX ORDER — SODIUM CHLORIDE 0.9 G/100ML
IRRIGANT IRRIGATION
Status: DISCONTINUED | OUTPATIENT
Start: 2019-01-22 | End: 2019-01-22 | Stop reason: HOSPADM

## 2019-01-22 RX ORDER — WARFARIN SODIUM 5 MG/1
5 TABLET ORAL DAILY
Status: DISCONTINUED | OUTPATIENT
Start: 2019-01-22 | End: 2019-01-26 | Stop reason: HOSPADM

## 2019-01-22 RX ORDER — HEPARIN SODIUM 1000 [USP'U]/ML
INJECTION, SOLUTION INTRAVENOUS; SUBCUTANEOUS
Status: DISCONTINUED | OUTPATIENT
Start: 2019-01-22 | End: 2019-01-22

## 2019-01-22 RX ORDER — CEFAZOLIN SODIUM 1 G/3ML
2 INJECTION, POWDER, FOR SOLUTION INTRAMUSCULAR; INTRAVENOUS
Status: COMPLETED | OUTPATIENT
Start: 2019-01-22 | End: 2019-01-23

## 2019-01-22 RX ORDER — LIDOCAINE HCL/PF 100 MG/5ML
SYRINGE (ML) INTRAVENOUS
Status: DISCONTINUED | OUTPATIENT
Start: 2019-01-22 | End: 2019-01-22

## 2019-01-22 RX ORDER — SODIUM CHLORIDE 9 MG/ML
INJECTION, SOLUTION INTRAVENOUS CONTINUOUS PRN
Status: DISCONTINUED | OUTPATIENT
Start: 2019-01-22 | End: 2019-01-22

## 2019-01-22 RX ORDER — HEPARIN SODIUM 200 [USP'U]/100ML
INJECTION, SOLUTION INTRAVENOUS
Status: DISCONTINUED | OUTPATIENT
Start: 2019-01-22 | End: 2019-01-23

## 2019-01-22 RX ORDER — DEXTROSE MONOHYDRATE AND SODIUM CHLORIDE 5; .45 G/100ML; G/100ML
INJECTION, SOLUTION INTRAVENOUS CONTINUOUS
Status: DISCONTINUED | OUTPATIENT
Start: 2019-01-22 | End: 2019-01-23

## 2019-01-22 RX ORDER — PROTAMINE SULFATE 10 MG/ML
INJECTION, SOLUTION INTRAVENOUS
Status: DISCONTINUED | OUTPATIENT
Start: 2019-01-22 | End: 2019-01-22

## 2019-01-22 RX ORDER — ASPIRIN 81 MG/1
81 TABLET ORAL DAILY
Status: DISCONTINUED | OUTPATIENT
Start: 2019-01-23 | End: 2019-01-26 | Stop reason: HOSPADM

## 2019-01-22 RX ORDER — PROPOFOL 10 MG/ML
VIAL (ML) INTRAVENOUS
Status: DISCONTINUED | OUTPATIENT
Start: 2019-01-22 | End: 2019-01-22

## 2019-01-22 RX ORDER — NOREPINEPHRINE BITARTRATE 1 MG/ML
INJECTION, SOLUTION INTRAVENOUS
Status: DISCONTINUED | OUTPATIENT
Start: 2019-01-22 | End: 2019-01-22

## 2019-01-22 RX ORDER — ACETAMINOPHEN 325 MG/1
650 TABLET ORAL EVERY 4 HOURS PRN
Status: DISCONTINUED | OUTPATIENT
Start: 2019-01-22 | End: 2019-01-26 | Stop reason: HOSPADM

## 2019-01-22 RX ORDER — EPINEPHRINE 0.1 MG/ML
INJECTION INTRAVENOUS
Status: DISCONTINUED | OUTPATIENT
Start: 2019-01-22 | End: 2019-01-22

## 2019-01-22 RX ORDER — ALBUTEROL SULFATE 90 UG/1
2 AEROSOL, METERED RESPIRATORY (INHALATION) EVERY 6 HOURS PRN
Status: DISCONTINUED | OUTPATIENT
Start: 2019-01-22 | End: 2019-01-26 | Stop reason: HOSPADM

## 2019-01-22 RX ORDER — NOREPINEPHRINE BITARTRATE/D5W 4MG/250ML
0.02 PLASTIC BAG, INJECTION (ML) INTRAVENOUS CONTINUOUS
Status: DISCONTINUED | OUTPATIENT
Start: 2019-01-22 | End: 2019-01-23

## 2019-01-22 RX ORDER — ATORVASTATIN CALCIUM 20 MG/1
40 TABLET, FILM COATED ORAL DAILY
Status: DISCONTINUED | OUTPATIENT
Start: 2019-01-23 | End: 2019-01-26 | Stop reason: HOSPADM

## 2019-01-22 RX ORDER — KETAMINE HYDROCHLORIDE 10 MG/ML
INJECTION, SOLUTION INTRAMUSCULAR; INTRAVENOUS
Status: DISCONTINUED | OUTPATIENT
Start: 2019-01-22 | End: 2019-01-22

## 2019-01-22 RX ORDER — ROCURONIUM BROMIDE 10 MG/ML
INJECTION, SOLUTION INTRAVENOUS
Status: DISCONTINUED | OUTPATIENT
Start: 2019-01-22 | End: 2019-01-22

## 2019-01-22 RX ORDER — ALPRAZOLAM 0.25 MG/1
0.25 TABLET ORAL NIGHTLY PRN
Status: DISCONTINUED | OUTPATIENT
Start: 2019-01-22 | End: 2019-01-26 | Stop reason: HOSPADM

## 2019-01-22 RX ORDER — MIDAZOLAM HYDROCHLORIDE 1 MG/ML
INJECTION, SOLUTION INTRAMUSCULAR; INTRAVENOUS
Status: DISCONTINUED | OUTPATIENT
Start: 2019-01-22 | End: 2019-01-22

## 2019-01-22 RX ADMIN — MIDAZOLAM 1 MG: 1 INJECTION INTRAMUSCULAR; INTRAVENOUS at 07:01

## 2019-01-22 RX ADMIN — DEXTROSE 2 G: 50 INJECTION, SOLUTION INTRAVENOUS at 08:01

## 2019-01-22 RX ADMIN — LIDOCAINE HYDROCHLORIDE 100 MG: 20 INJECTION, SOLUTION INTRAVENOUS at 08:01

## 2019-01-22 RX ADMIN — EPINEPHRINE 10 MCG: 0.1 INJECTION, SOLUTION ENDOTRACHEAL; INTRACARDIAC; INTRAVENOUS at 09:01

## 2019-01-22 RX ADMIN — EPINEPHRINE 10 MCG: 0.1 INJECTION, SOLUTION ENDOTRACHEAL; INTRACARDIAC; INTRAVENOUS at 08:01

## 2019-01-22 RX ADMIN — LIDOCAINE HYDROCHLORIDE 75 MG: 20 INJECTION, SOLUTION INTRAVENOUS at 07:01

## 2019-01-22 RX ADMIN — CEFAZOLIN 2 G: 1 INJECTION, POWDER, FOR SOLUTION INTRAMUSCULAR; INTRAVENOUS at 04:01

## 2019-01-22 RX ADMIN — NOREPINEPHRINE BITARTRATE 8 MCG: 1 INJECTION, SOLUTION, CONCENTRATE INTRAVENOUS at 07:01

## 2019-01-22 RX ADMIN — SODIUM CHLORIDE 500 ML: 0.9 INJECTION, SOLUTION INTRAVENOUS at 02:01

## 2019-01-22 RX ADMIN — PROPOFOL 20 MG: 10 INJECTION, EMULSION INTRAVENOUS at 08:01

## 2019-01-22 RX ADMIN — WARFARIN SODIUM 5 MG: 5 TABLET ORAL at 04:01

## 2019-01-22 RX ADMIN — FENTANYL CITRATE 100 MCG: 50 INJECTION, SOLUTION INTRAMUSCULAR; INTRAVENOUS at 07:01

## 2019-01-22 RX ADMIN — PROTAMINE SULFATE 130 MG: 10 INJECTION, SOLUTION INTRAVENOUS at 09:01

## 2019-01-22 RX ADMIN — SODIUM CHLORIDE: 0.9 INJECTION, SOLUTION INTRAVENOUS at 07:01

## 2019-01-22 RX ADMIN — EPINEPHRINE 0.04 MCG/KG/MIN: 1 INJECTION INTRAMUSCULAR; INTRAVENOUS; SUBCUTANEOUS at 07:01

## 2019-01-22 RX ADMIN — SUGAMMADEX 350 MG: 100 INJECTION, SOLUTION INTRAVENOUS at 09:01

## 2019-01-22 RX ADMIN — EPINEPHRINE 10 MCG: 0.1 INJECTION, SOLUTION ENDOTRACHEAL; INTRACARDIAC; INTRAVENOUS at 07:01

## 2019-01-22 RX ADMIN — KETAMINE HYDROCHLORIDE 20 MG: 10 INJECTION, SOLUTION INTRAMUSCULAR; INTRAVENOUS at 07:01

## 2019-01-22 RX ADMIN — NOREPINEPHRINE BITARTRATE 16 MCG: 1 INJECTION, SOLUTION, CONCENTRATE INTRAVENOUS at 07:01

## 2019-01-22 RX ADMIN — ROCURONIUM BROMIDE 50 MG: 10 INJECTION, SOLUTION INTRAVENOUS at 07:01

## 2019-01-22 RX ADMIN — HEPARIN SODIUM 10000 UNITS: 1000 INJECTION INTRAVENOUS; SUBCUTANEOUS at 08:01

## 2019-01-22 RX ADMIN — HEPARIN SODIUM 3000 UNITS: 1000 INJECTION INTRAVENOUS; SUBCUTANEOUS at 08:01

## 2019-01-22 RX ADMIN — SODIUM CHLORIDE: 0.9 INJECTION, SOLUTION INTRAVENOUS at 10:01

## 2019-01-22 RX ADMIN — PROPOFOL 50 MG: 10 INJECTION, EMULSION INTRAVENOUS at 07:01

## 2019-01-22 RX ADMIN — SODIUM CHLORIDE, SODIUM GLUCONATE, SODIUM ACETATE, POTASSIUM CHLORIDE, MAGNESIUM CHLORIDE, SODIUM PHOSPHATE, DIBASIC, AND POTASSIUM PHOSPHATE: .53; .5; .37; .037; .03; .012; .00082 INJECTION, SOLUTION INTRAVENOUS at 07:01

## 2019-01-22 NOTE — ANESTHESIA PREPROCEDURE EVALUATION
01/22/2019  Pre-operative evaluation for Procedure(s) (LRB):  Valve study-mitral (N/A)    Tara Mondragon is a 73 y.o. female hx of CAD (s/p CABG/MVR 2013), HTN, CKD4 who presents for the above procedure 2/2 MR.  A coronary angiogram revealed patent LIMA to LAD and occluded SVG to RCA (native RCA patent) and severe MR with dilated LA    Airway: GRade 2 DL    ? Coronary angiogram (11/28/18): Patent LIMA to LAD, occluded SVG to RCA, native RCA patent, LCx (separate ostia) patent with patent mid stent. Severe (4+) MR with LA dilation.  ? PFTs: Severe Restriction, FEV1= 57.7% predicted, FVC= 63.5% predicted, DLCO = 42% predicted    Patient Active Problem List   Diagnosis    PAD (peripheral artery disease)    CAD (coronary artery disease)    Cardiomyopathy, secondary    Left ventricular diastolic dysfunction, NYHA class 3    Cardiomyopathy    Hyperglycemia    S/P MVR (mitral valve replacement)    S/P CABG x 2    Disorder of arteries and arterioles    Valvular regurgitation    Hypertension    Hyperlipidemia    Hyperthyroidism    Acute coronary syndrome    Diastolic dysfunction    Obesity (BMI 30.0-34.9)    HERNANDES (dyspnea on exertion)    Shortness of breath    Chronic diastolic heart failure    Congestive heart failure    Severe mitral regurgitation    Stage 4 chronic kidney disease    Mitral valve stenosis       Review of patient's allergies indicates:   Allergen Reactions    Plavix [clopidogrel] Rash       No current facility-administered medications on file prior to encounter.      Current Outpatient Medications on File Prior to Encounter   Medication Sig Dispense Refill    acetaminophen (TYLENOL) 325 MG tablet Take 325 mg by mouth once as needed for Pain.      albuterol (VENTOLIN HFA) 90 mcg/actuation inhaler Inhale 2 puffs into the lungs every 6 (six) hours as needed for Wheezing or  Shortness of Breath. Rescue 6.7 g 2    ammonium lactate 12 % Crea Apply to feet twice daily. Avoid use between toes. 140 g 5    aspirin (ECOTRIN) 81 MG EC tablet Take 1 tablet by mouth Daily.      atorvastatin (LIPITOR) 40 MG tablet TAKE 1 TABLET(40 MG) BY MOUTH EVERY DAY 90 tablet 4    cilostazol (PLETAL) 50 MG Tab Take 1 tablet (50 mg total) by mouth 2 (two) times daily. 60 tablet 11    furosemide (LASIX) 40 MG tablet Take 1 tablet (40 mg total) by mouth 2 (two) times daily. 60 tablet 11    guaifenesin (MUCINEX) 600 mg 12 hr tablet Take 1,200 mg by mouth once as needed for Congestion.      metoprolol tartrate (LOPRESSOR) 50 MG tablet Take 1 tablet (50 mg total) by mouth every 6 (six) hours. 120 tablet 11    nitroGLYCERIN (NITROSTAT) 0.4 MG SL tablet Place 1 tablet (0.4 mg total) under the tongue every 5 (five) minutes as needed for Chest pain. 100 tablet 0    triamcinolone acetonide 0.1% (KENALOG) 0.1 % ointment Apply topically 2 (two) times daily. 30 g 2       Past Surgical History:   Procedure Laterality Date    CARDIAC VALVE SURGERY      porcine MVR    CHOLECYSTECTOMY      CORONARY ANGIOPLASTY      CORONARY ARTERY BYPASS GRAFT      CORONARY ARTERY BYPASS GRAFT (CABG) N/A 2/26/2013    Performed by [facility] at [facility] OR 2ND FLR    ECHOCARDIOGRAM, TRANSESOPHAGEAL N/A 11/19/2018    Per[facility]uffy [facility] at [facility] CATH LAB/EP    Left heart cath N/A 11/28/2018    Performed by Lefty Bailey [facility] at [facility] CATH LAB/EP    REPAIR VALVE-MITRAL N/A 2/26/2013    Performed by [facility] at [facility] OR 2ND FLR    TONSILLECTOMY         Social History     Socioeconomic History    Marital status:      Spouse name: Not on file    Number of children: Not on file    Years of education: Not on file    Highest education level: Not on file   Social Needs    Financial resource strain: Not on file    Food insecurity - worry: Not on file    Food insecurity - inability: Not on  file    Transportation needs - medical: Not on file    Transportation needs - non-medical: Not on file   Occupational History    Not on file   Tobacco Use    Smoking status: Former Smoker     Packs/day: 1.00     Years: 50.00     Pack years: 50.00     Last attempt to quit: 2/3/2013     Years since quittin.9    Smokeless tobacco: Never Used   Substance and Sexual Activity    Alcohol use: No    Drug use: No    Sexual activity: Not on file   Other Topics Concern    Not on file   Social History Narrative    Not on file         CBC:   Recent Labs     19  0907   WBC 9.95   RBC 3.01*   HGB 9.0*   HCT 28.1*      MCV 93   MCH 29.9   MCHC 32.0       CMP:   Recent Labs     19  0907   *   K 4.5      CO2 21*   BUN 34*   CREATININE 1.8*   *   CALCIUM 9.3   ALBUMIN 3.0*       INR  Recent Labs     19  0907   INR 1.2   APTT 23.7           Diagnostic Studies:  [facility]:  · LVEDP 18  · EF 65%  · Severe (4+) mitral regurgitation with severe LA dilation.  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  · Patent LIMA-LAD, occluded SVG-RCA  · Native RCA patent with mild to moderate disease  · Separate ostia of LAD and LCX and LCX native circulation patent with patent mid stent  · RHC pressures: RA 8/7 (4), RV 61/4, RPAP 60/18, PWP 21/48 (26), SUZIE CO 4.73, SUZEI CI 2.43    EKG:  Normal sinus rhythm  anteroseptal T wave changes, consider ischemia   Abnormal ECG  When compared with ECG of 2018 06:32,  T wave changes more prominent   Confirmed by [facility]OHNSON [facility] ([id number][facility] on 2018 10:01:18 AM    2D Echo:  Results for orders placed or performed during the hospital encounter of 04/30/15   2D echo with color flow doppler   Result Value Ref Range    QEF 55 55 - 65   · There is a porcine bioprosthetic mitral valve. The prosthetic valve is normal.  · Moderate mitral regurgitation. No Systolic reversal on doppler of pulmonary vein  · Normal left ventricular systolic function. The  estimated ejection fraction is 65%  · Mild aortic valve stenosis.  Grade 2 plaque present.    Pharm stress:  · The EKG portion of this study is negative for myocardial ischemia.  · There were no arrhythmias during stress.  · The patient reported no symptoms during the stress test.           Anesthesia Evaluation    I have reviewed the Patient Summary Reports.    I have reviewed the Nursing Notes.   I have reviewed the Medications.     Review of Systems  Anesthesia Hx:  No problems with previous Anesthesia  History of prior surgery of interest to airway management or planning: Denies Family Hx of Anesthesia complications.   Denies Personal Hx of Anesthesia complications.   Hematology/Oncology:         -- Denies Anemia:   Cardiovascular:   Exercise tolerance: poor Hypertension Valvular problems/Murmurs, MR CAD  CABG/stent      ECG has been reviewed.    Pulmonary:   Denies COPD.  Denies Asthma. Shortness of breath  Denies Sleep Apnea.    Renal/:   Chronic Renal Disease, CRI    Hepatic/GI:   Denies GERD. Denies Liver Disease.    Neurological:   Denies CVA. Denies Seizures.    Endocrine:   Denies Diabetes.        Physical Exam  General:  Well nourished, Obesity    Airway/Jaw/Neck:  Airway Findings: Mouth Opening: Normal Tongue: Normal  General Airway Assessment: Adult  Mallampati: II  Improves to II with phonation.  TM Distance: Normal, at least 6 cm  Jaw/Neck Findings:  Neck ROM: Normal ROM      Dental:  Dental Findings: In tact    Chest/Lungs:  Chest/Lungs Findings: Clear to auscultation, Normal Respiratory Rate     Heart/Vascular:  Heart Findings: Rate: Normal  Rhythm: Regular Rhythm  Sounds: Normal        Mental Status:  Mental Status Findings:  Cooperative, Alert and Oriented         Anesthesia Plan  Type of Anesthesia, risks & benefits discussed:  Anesthesia Type:  general  Patient's Preference:   Intra-op Monitoring Plan: arterial line, central line and standard ASA monitors  Intra-op Monitoring Plan Comments:    Post Op Pain Control Plan: per primary service following discharge from PACU  Post Op Pain Control Plan Comments:   Induction:   IV  Beta Blocker:  Patient is on a Beta-Blocker and has received one dose within the past 24 hours (No further documentation required).       Informed Consent: Patient understands risks and agrees with Anesthesia plan.  Questions answered. Anesthesia consent signed with patient.  ASA Score: 4     Day of Surgery Review of History & Physical:    H&P update referred to the provider.         Ready For Surgery From Anesthesia Perspective.

## 2019-01-22 NOTE — PLAN OF CARE
Valve Fellow Brief Note:    Patient seen and examined at bedside. JVD to mid neck in setting of MR. Informed consent obtained for mitral valve in valve implantation.     -The risks, benefits and alternatives of the procedure were explained to the patient.   -The risks of valve implantation include but are not limited to: bleeding, infection, heart rhythm abnormalities, allergic reactions, kidney injury, stroke and death.   -The risks of moderate sedation include hypotension, respiratory depression, arrhythmias, bronchospasm, and death.   - Informed consent was obtained and the patient is agreeable to proceed with the procedure.      Hernandez Portillo MD  PGY-9  Interventional Cardiology Fellow/Structural Fellow

## 2019-01-22 NOTE — NURSING
Spoke with Anna ROBLERO sys BP dipping into 90's. Orders to bolus 500ml of NaCl 0.9%, pressure bag in.     Bolus completed. PACs and PVCs present, when in NSR BP sys 100's, with ectopy Sys BP dips into 90's. Orders to get pt up, if BP sys sustains in 90's call the team.

## 2019-01-22 NOTE — TRANSFER OF CARE
"Anesthesia Transfer of Care Note    Patient: Tara Mondragon    Procedure(s) Performed: Procedure(s) (LRB):  Valve study-mitral (N/A)    Patient location: ICU    Anesthesia Type: general    Transport from OR: Transported from OR on 6-10 L/min O2 by face mask with adequate spontaneous ventilation. Continuous ECG monitoring in transport. Continuous SpO2 monitoring in transport. Continuos invasive BP monitoring in transport    Post pain: adequate analgesia    Post assessment: no apparent anesthetic complications    Post vital signs: stable    Level of consciousness: awake and alert    Nausea/Vomiting: no nausea/vomiting    Complications: none    Transfer of care protocol was followed      Last vitals:   Visit Vitals  BP 97/69 (BP Location: Left arm, Patient Position: Lying)   Pulse 92   Temp 36.2 °C (97.1 °F) (Temporal)   Resp 18   Ht 5' 4" (1.626 m)   Wt 78 kg (172 lb)   SpO2 95%   BMI 29.52 kg/m²     "

## 2019-01-22 NOTE — INTERVAL H&P NOTE
The patient has been examined and the H&P has been reviewed:    I concur with the findings and no changes have occurred since H&P was written.    Anesthesia/Surgery risks, benefits and alternative options discussed and understood by patient/family.          Active Hospital Problems    Diagnosis  POA    Mitral valve stenosis [I05.0]  Yes      Resolved Hospital Problems   No resolved problems to display.

## 2019-01-22 NOTE — Clinical Note
1.5 ml injected throughout the case. 98.5 mL total wasted during the case. 100 mL total used in the case.

## 2019-01-22 NOTE — ANESTHESIA PROCEDURE NOTES
Arterial    Diagnosis: Mitral regurgitation   Doctor requesting consult: Bienvenido    Patient location during procedure: done in OR  Procedure start time: 1/22/2019 7:36 AM  Timeout: 1/22/2019 7:35 AM  Procedure end time: 1/22/2019 7:40 AM  Staffing  Anesthesiologist: Triston Nicole Jr., MD  Resident/CRNA: Juan Jose Pandya MD  Performed: resident/CRNA   Anesthesiologist was present at the time of the procedure.  Preanesthetic Checklist  Completed: patient identified, site marked, surgical consent, pre-op evaluation, timeout performed, IV checked, risks and benefits discussed, monitors and equipment checked and anesthesia consent givenArterial  Skin Prep: chlorhexidine gluconate  Local Infiltration: lidocaine  Orientation: right  Location: radial  Catheter Size: 20 G  Catheter placement by Ultrasound guidance. Heme positive aspiration all ports.  Vessel Caliber: small, patent, compressibility poor  Vascular Doppler:  not done  Needle advanced into vessel with real time Ultrasound guidance.  Guidewire confirmed in vessel.  Sterile sheath used.Insertion Attempts: 1  Assessment  Dressing: secured with tape and tegaderm  Patient: Tolerated well

## 2019-01-23 PROBLEM — R57.0 CARDIOGENIC SHOCK: Status: ACTIVE | Noted: 2019-01-23

## 2019-01-23 PROBLEM — E87.6 HYPOKALEMIA: Status: ACTIVE | Noted: 2019-01-23

## 2019-01-23 PROBLEM — I50.33 ACUTE ON CHRONIC DIASTOLIC HEART FAILURE: Status: ACTIVE | Noted: 2019-01-23

## 2019-01-23 PROBLEM — D62 POSTOPERATIVE ANEMIA DUE TO ACUTE BLOOD LOSS: Status: ACTIVE | Noted: 2019-01-23

## 2019-01-23 PROBLEM — D64.9 ANEMIA, UNSPECIFIED: Status: ACTIVE | Noted: 2019-01-23

## 2019-01-23 PROBLEM — I34.0 NON-RHEUMATIC MITRAL REGURGITATION: Status: ACTIVE | Noted: 2019-01-22

## 2019-01-23 LAB
ANION GAP SERPL CALC-SCNC: 12 MMOL/L
ANISOCYTOSIS BLD QL SMEAR: SLIGHT
BASOPHILS # BLD AUTO: 0.01 K/UL
BASOPHILS NFR BLD: 0.1 %
BUN SERPL-MCNC: 24 MG/DL
CALCIUM SERPL-MCNC: 8.8 MG/DL
CHLORIDE SERPL-SCNC: 108 MMOL/L
CO2 SERPL-SCNC: 18 MMOL/L
CREAT SERPL-MCNC: 1.2 MG/DL
DACRYOCYTES BLD QL SMEAR: ABNORMAL
DIFFERENTIAL METHOD: ABNORMAL
EOSINOPHIL # BLD AUTO: 0.1 K/UL
EOSINOPHIL NFR BLD: 1 %
ERYTHROCYTE [DISTWIDTH] IN BLOOD BY AUTOMATED COUNT: 17.4 %
EST. GFR  (AFRICAN AMERICAN): 51.1 ML/MIN/1.73 M^2
EST. GFR  (NON AFRICAN AMERICAN): 44.3 ML/MIN/1.73 M^2
GIANT PLATELETS BLD QL SMEAR: PRESENT
GLUCOSE SERPL-MCNC: 118 MG/DL
HCT VFR BLD AUTO: 25 %
HGB BLD-MCNC: 7.8 G/DL
HYPOCHROMIA BLD QL SMEAR: ABNORMAL
IMM GRANULOCYTES # BLD AUTO: 0.06 K/UL
IMM GRANULOCYTES NFR BLD AUTO: 0.8 %
INR PPP: 1.3
LYMPHOCYTES # BLD AUTO: 0.9 K/UL
LYMPHOCYTES NFR BLD: 11.8 %
MCH RBC QN AUTO: 30 PG
MCHC RBC AUTO-ENTMCNC: 31.2 G/DL
MCV RBC AUTO: 96 FL
MONOCYTES # BLD AUTO: 0.6 K/UL
MONOCYTES NFR BLD: 7.2 %
NEUTROPHILS # BLD AUTO: 6.3 K/UL
NEUTROPHILS NFR BLD: 79.1 %
NRBC BLD-RTO: 0 /100 WBC
OVALOCYTES BLD QL SMEAR: ABNORMAL
PLATELET # BLD AUTO: 179 K/UL
PLATELET BLD QL SMEAR: ABNORMAL
PMV BLD AUTO: 9.8 FL
POIKILOCYTOSIS BLD QL SMEAR: SLIGHT
POLYCHROMASIA BLD QL SMEAR: ABNORMAL
POTASSIUM SERPL-SCNC: 3.2 MMOL/L
PROTHROMBIN TIME: 13.2 SEC
RBC # BLD AUTO: 2.6 M/UL
SODIUM SERPL-SCNC: 138 MMOL/L
TOXIC GRANULES BLD QL SMEAR: PRESENT
WBC # BLD AUTO: 7.97 K/UL

## 2019-01-23 PROCEDURE — 27000221 HC OXYGEN, UP TO 24 HOURS

## 2019-01-23 PROCEDURE — 93010 ELECTROCARDIOGRAM REPORT: CPT | Mod: ,,, | Performed by: INTERNAL MEDICINE

## 2019-01-23 PROCEDURE — 25000003 PHARM REV CODE 250: Performed by: PHYSICIAN ASSISTANT

## 2019-01-23 PROCEDURE — 63600175 PHARM REV CODE 636 W HCPCS: Performed by: STUDENT IN AN ORGANIZED HEALTH CARE EDUCATION/TRAINING PROGRAM

## 2019-01-23 PROCEDURE — 93010 EKG 12-LEAD: ICD-10-PCS | Mod: ,,, | Performed by: INTERNAL MEDICINE

## 2019-01-23 PROCEDURE — 97161 PT EVAL LOW COMPLEX 20 MIN: CPT

## 2019-01-23 PROCEDURE — 93005 ELECTROCARDIOGRAM TRACING: CPT

## 2019-01-23 PROCEDURE — 80048 BASIC METABOLIC PNL TOTAL CA: CPT

## 2019-01-23 PROCEDURE — 25000003 PHARM REV CODE 250: Performed by: STUDENT IN AN ORGANIZED HEALTH CARE EDUCATION/TRAINING PROGRAM

## 2019-01-23 PROCEDURE — 20000000 HC ICU ROOM

## 2019-01-23 PROCEDURE — 85025 COMPLETE CBC W/AUTO DIFF WBC: CPT

## 2019-01-23 PROCEDURE — 97116 GAIT TRAINING THERAPY: CPT

## 2019-01-23 PROCEDURE — 94799 UNLISTED PULMONARY SVC/PX: CPT

## 2019-01-23 PROCEDURE — 63600175 PHARM REV CODE 636 W HCPCS: Performed by: PHYSICIAN ASSISTANT

## 2019-01-23 PROCEDURE — 25000003 PHARM REV CODE 250: Performed by: INTERNAL MEDICINE

## 2019-01-23 PROCEDURE — 97110 THERAPEUTIC EXERCISES: CPT

## 2019-01-23 PROCEDURE — 85610 PROTHROMBIN TIME: CPT

## 2019-01-23 RX ORDER — FUROSEMIDE 10 MG/ML
40 INJECTION INTRAMUSCULAR; INTRAVENOUS ONCE
Status: COMPLETED | OUTPATIENT
Start: 2019-01-23 | End: 2019-01-23

## 2019-01-23 RX ORDER — FUROSEMIDE 10 MG/ML
20 INJECTION INTRAMUSCULAR; INTRAVENOUS ONCE
Status: COMPLETED | OUTPATIENT
Start: 2019-01-23 | End: 2019-01-23

## 2019-01-23 RX ORDER — POTASSIUM CHLORIDE 20 MEQ/1
40 TABLET, EXTENDED RELEASE ORAL ONCE
Status: DISCONTINUED | OUTPATIENT
Start: 2019-01-23 | End: 2019-01-23

## 2019-01-23 RX ORDER — POTASSIUM CHLORIDE 750 MG/1
40 CAPSULE, EXTENDED RELEASE ORAL ONCE
Status: COMPLETED | OUTPATIENT
Start: 2019-01-23 | End: 2019-01-23

## 2019-01-23 RX ORDER — METOPROLOL TARTRATE 25 MG/1
25 TABLET, FILM COATED ORAL 2 TIMES DAILY
Status: DISCONTINUED | OUTPATIENT
Start: 2019-01-23 | End: 2019-01-25

## 2019-01-23 RX ORDER — METOPROLOL TARTRATE 25 MG/1
25 TABLET, FILM COATED ORAL 2 TIMES DAILY
Status: DISCONTINUED | OUTPATIENT
Start: 2019-01-23 | End: 2019-01-23

## 2019-01-23 RX ADMIN — CEFAZOLIN 2 G: 1 INJECTION, POWDER, FOR SOLUTION INTRAMUSCULAR; INTRAVENOUS at 01:01

## 2019-01-23 RX ADMIN — POTASSIUM CHLORIDE 40 MEQ: 750 CAPSULE, EXTENDED RELEASE ORAL at 11:01

## 2019-01-23 RX ADMIN — METOPROLOL TARTRATE 25 MG: 25 TABLET ORAL at 09:01

## 2019-01-23 RX ADMIN — FUROSEMIDE 40 MG: 10 INJECTION, SOLUTION INTRAMUSCULAR; INTRAVENOUS at 04:01

## 2019-01-23 RX ADMIN — FUROSEMIDE 5 MG/HR: 10 INJECTION, SOLUTION INTRAMUSCULAR; INTRAVENOUS at 05:01

## 2019-01-23 RX ADMIN — FUROSEMIDE 5 MG/HR: 10 INJECTION, SOLUTION INTRAMUSCULAR; INTRAVENOUS at 11:01

## 2019-01-23 RX ADMIN — FUROSEMIDE 40 MG: 10 INJECTION, SOLUTION INTRAMUSCULAR; INTRAVENOUS at 12:01

## 2019-01-23 RX ADMIN — METOPROLOL TARTRATE 25 MG: 25 TABLET ORAL at 12:01

## 2019-01-23 RX ADMIN — FUROSEMIDE 20 MG: 10 INJECTION, SOLUTION INTRAMUSCULAR; INTRAVENOUS at 10:01

## 2019-01-23 RX ADMIN — ATORVASTATIN CALCIUM 40 MG: 20 TABLET, FILM COATED ORAL at 09:01

## 2019-01-23 RX ADMIN — POTASSIUM CHLORIDE 40 MEQ: 750 CAPSULE, EXTENDED RELEASE ORAL at 03:01

## 2019-01-23 RX ADMIN — ASPIRIN 81 MG: 81 TABLET, COATED ORAL at 09:01

## 2019-01-23 RX ADMIN — WARFARIN SODIUM 5 MG: 5 TABLET ORAL at 04:01

## 2019-01-23 RX ADMIN — FUROSEMIDE 20 MG: 10 INJECTION, SOLUTION INTRAMUSCULAR; INTRAVENOUS at 09:01

## 2019-01-23 NOTE — PLAN OF CARE
Problem: Adult Inpatient Plan of Care  Goal: Plan of Care Review  No acute events throughout shift. See vital signs and assessments for documentation. See below for updates.    Pulmonary:  2LPM via NC.  SpO2 >90%.    Cardiac: NSR with runs of PVCs and PACs.  Call team if systolic BP sustains <90.  Pt s/p Mitral Valve Repair.      Neurological: AAO X 4    Gastrointestinal: No BM this shift.  +BS [id number].     Genitourinary: Pt used Purewick throughout shift, BP stable.  Can now ambulate to toilet with assistance.     Endocrine: Pt on a low sodium diet. No appetite.  Family to bring patient something from outside the hospital.      Integumentary/other: Bilateral Groin sites (-) drainage.  CDI dressings.  Monitor pedal pulses, dopplerable pulses throughout shift.     Gtts:  n/a    POC: Maintain systolic BP >90.  D/C 01/23/2019.      Patient KIM Mondragon and family updated on POC. Questions and concerns addressed. WCNICKO

## 2019-01-23 NOTE — ANESTHESIA POSTPROCEDURE EVALUATION
"Anesthesia Post Evaluation    Patient: Tara Mondragon    Procedure(s) Performed: Procedure(s) (LRB):  Valve study-mitral (N/A)  Replacement, Mitral Valve, Percutaneous, Transcatheter    Final Anesthesia Type: general  Patient location during evaluation: ICU  Patient participation: Yes- Able to Participate  Level of consciousness: awake and alert and oriented  Post-procedure vital signs: reviewed and stable  Pain management: adequate  Airway patency: patent  PONV status at discharge: No PONV  Anesthetic complications: no      Cardiovascular status: blood pressure returned to baseline, hemodynamically stable and stable  Respiratory status: spontaneous ventilation and nasal cannula  Hydration status: euvolemic  Follow-up not needed.        Visit Vitals  /60   Pulse 104   Temp 36.5 °C (97.7 °F) (Oral)   Resp (!) 29   Ht 5' 4" (1.626 m)   Wt 78 kg (172 lb)   SpO2 100%   BMI 29.52 kg/m²       Pain/Sunita Score: No Data Recorded      "

## 2019-01-23 NOTE — HPI
Ms. Mondragon is a very pleasant lady referred by Dr. Bailey and Dr. Duffy for evaluation of failing bioprosthetic mitral valve. She underwent MVR (27mm Mosaic MVR) + CABG (LIMA to LAD, SVG to RCA) by Dr. Jason in 2013. She did well until early November when she began to develop worsening HERNANDES. She was hospitalized at [facility] on 11/17 with decompensated heart failure. A LINDA at that time showed moderate MR. A coronary angiogram revealed patent LIMA to LAD and occluded SVG to RCA (native RCA patent) and severe MR with dilated LA. She was hospitalized again 11/23 for decompensated heart failure. She was subsequently referred back to Dr. Jason for consideration of redo MVR. He felt she was high risk and recommended she be evaluated for TMVR.      Currently, she becomes SOB ambulating more than 100 ft. She denies CP, PND, orthopnea, or LE edema.      She has undergone the following TMVR work-up:  ? S/p 27mm Mosaic MVR + CABG (Eren, 2013) - True ID = 22mm (OK for OS 23mm or US 26mm Alex S3)  ? LINDA (11/19/18): Moderate MR, EF= 65%  ? Coronary angiogram (11/28/18): Patent LIMA to LAD, occluded SVG to RCA, native RCA patent, LCx (separate ostia) patent with patent mid stent. Severe (4+) MR with LA dilation.  ? Frailty: 2/4  ? Rhythm issues: None  ? PFTs: Severe Restriction, FEV1= 57.7% predicted, FVC= 63.5% predicted, DLCO = 42% predicted.   ? MV per SRR: Area= 3.15 cm2, Avg Diam = 20 mm (21.1 x 19.7)  ? Incidental CT findings: pending official read  ? She is high risk per Dr. Jason due to debility and comorbidities.   ? Comorbidities; Stage III CKD, PAD (occluded L mid SFA)     OK for 23 or 26mm Alex S3 Valve-in-Valve TMVR via transseptal access.

## 2019-01-23 NOTE — PLAN OF CARE
01/23/19 1418   Discharge Assessment   Assessment Type Discharge Planning Assessment   Confirmed/corrected address and phone number on facesheet? Yes   Assessment information obtained from? Patient;Medical Record   Expected Length of Stay (days) 3   Communicated expected length of stay with patient/caregiver yes  (Per MD)   Prior to hospitilization cognitive status: Alert/Oriented;No Deficits   Prior to hospitalization functional status: Independent   Current cognitive status: Alert/Oriented;No Deficits   Current Functional Status: Independent   Lives With spouse   Able to Return to Prior Arrangements yes   Is patient able to care for self after discharge? Yes  (With Assistance)   Who are your caregiver(s) and their phone number(s)? Kirby Mondragon (Spouse) [phone]   Patient's perception of discharge disposition other (comments)  (Cardiac Rehab Therapy vs Home Health)   Readmission Within the Last 30 Days no previous admission in last 30 days   Patient currently being followed by outpatient case management? No   Patient currently receives any other outside agency services? No   Equipment Currently Used at Home nebulizer   Do you have any problems affording any of your prescribed medications? No   Is the patient taking medications as prescribed? yes   Does the patient have transportation home? Yes   Transportation Anticipated family or friend will provide   Dialysis Name and Scheduled days N/A   Does the patient receive services at the Coumadin Clinic? No   Discharge Plan A Rehab  (Cardiac Rehab)   Discharge Plan B Home;Home Health   DME Needed Upon Discharge  none   Patient/Family in Agreement with Plan yes       CM met with the patient, her spouse, and her daughter at the Bedside. The patient currently resides with her Spouse in a Single Story Home without ALEJANDRO. She states the only DME used in the home is a Nebulizer as needed and a shower chair. The patient was started on Coumadin this hospitalization. 1st D/C  choice per the patient and family is [facility] and the 2nd choice would be [facility]. They did not want a list of choices and stated they would like all care to remain in the [facility] network. CM will continue to follow the patient this admission for D/C needs. CM name and number placed on the board at the bedside and the patient and family were instructed to call with any D/C needs or questions. Understanding verbalized.

## 2019-01-23 NOTE — ASSESSMENT & PLAN NOTE
Patient presented in cardiogenic shock requiring Epi intra-op to complete procedure.   Now resolved post-TMVR.

## 2019-01-23 NOTE — PLAN OF CARE
Problem: Physical Therapy Goal  Goal: Physical Therapy Goal  Goals to be met by: 2019     Patient will increase functional independence with mobility by performin. Supine to sit with Set-up Barren  2. Sit to stand transfer with Modified Barren  3. Gait  x 100 feet with Supervision using no AD or LRAD.   4. Lower extremity exercise program x15 reps per handout, with independence    Outcome: Ongoing (interventions implemented as appropriate)  Pt evaluated and appropriate goals established.

## 2019-01-23 NOTE — ASSESSMENT & PLAN NOTE
Flail leaflet of 27mm Mosaic MVR with severe MR at high risk for redo surgery per Eren.  S/p successful Transeptal Mitral Ricardo TAVR with a 26mm Alex S3.  Pre LVOT gradient 3.0, post LVOT Gradient 2.5. MR eliminated. Mean mitral gradient 2, Pk V 1.0. No PVL.  Small ASD from transeptal dilation. Small L to R Shunt.  On ASA and Coumadin (Coumadin for 3 months post-procedure).

## 2019-01-23 NOTE — PT/OT/SLP EVAL
"Physical Therapy Evaluation    Patient Name:  Tara Mondragon   MRN:  [id number]    Recommendations:     Discharge Recommendations:  (HHPT)   Discharge Equipment Recommendations: none   Barriers to discharge: None    Assessment:     Tara Mondragon is a 73 y.o. female admitted with a medical diagnosis of Non-rheumatic mitral regurgitation.  She presents with the following impairments/functional limitations:  weakness, impaired endurance, impaired functional mobilty, gait instability, impaired balance, impaired self care skills, impaired cardiopulmonary response to activity. Pt with fluctuating SpO2, required 2 L O2 for OOB mobility to maintain SpO2 >90%. Pt presents very anxious with OOB activity with significant increase in -151 bpm. Pt demo'd decreased tolerance to activity, only able to ambulate very short distances before terminating 2/2 reports of SOB. At this time, pt requires assistance with mobility and would continue to benefit from acute skilled therapy intervention at this time to address deficits and progress toward prior level of function.       Rehab Prognosis: Good; patient would benefit from acute skilled PT services to address these deficits and reach maximum level of function.    Recent Surgery: Procedure(s) (LRB):  Valve study-mitral (N/A)  Replacement, Mitral Valve, Percutaneous, Transcatheter 1 Day Post-Op    Plan:     During this hospitalization, patient to be seen 4 x/week to address the identified rehab impairments via gait training, therapeutic activities, therapeutic exercises, neuromuscular re-education and progress toward the following goals:    · Plan of Care Expires:  02/23/19    Subjective     Chief Complaint: Pt anxious throughout session, stated "I thought I would be better after my procedure but I still feel short of breath"   Patient/Family Comments/goals: to get better and return home   Pain/Comfort:  · Pain Rating 1: 0/10  · Pain Rating Post-Intervention 1: 0/10    Patients " cultural, spiritual, [employer] conflicts given the current situation: no    Living Environment:  Pt lives with her  in a H with no ALEJANDRO.   Prior to admission, patients level of function was independent with mobility and ADLs, occasionally required assistance donning shoes and socks. Pt states before admit, she was only able to walk very short distances and was experiencing severe SOB.  Pt stated she had an appointment to assess for home oxygen but she was admitted to hospital before her appointment.   Equipment used at home: shower chair.  DME owned (not currently used): none.  Upon discharge, patient will have assistance from , family.    Objective:     Communicated with RN prior to session.  Patient found HOB elevated, with  telemetry, pulse ox (continuous)  upon PT entry to room.    General Precautions: Standard, fall   Orthopedic Precautions:N/A   Braces: N/A     Exams:  · Cognitive Exam:  Patient is AAOx4, followed all commands, communicates clearly and fluently  · Gross Motor Coordination:  WFL  · RUE ROM: WFL  · RUE Strength: WFL  · LUE ROM: WFL  · LUE Strength: WFL  · RLE ROM: WFL  · RLE Strength: WFL  · LLE ROM: WFL  · LLE Strength: WFL    Functional Mobility:  · Bed Mobility:     · Supine to Sit: minimum assistance  · Transfers:     · Sit to Stand:  stand by assistance with no AD  · Gait: Pt ambulated 3x 10 feet with CGA and UL HHA. Pt demo'd decreased ade, decreased step size, increased lateral sway. Pt reported increase in SOB with ambulation, SpO2 fluctuated 89-96% with 2 L O2 via NC. HR increased from 110 bpm at rest to 151 bpm while ambulating. Pt terminated ambulation every trial 2/2 reports of SOB. No LOB, no reports of dizziness.       Therapeutic Activities and Exercises:  Following supine > sit transfer, pt SpO2 decreased to 87% on room air. Pt placed on 2 L O2, SpO2 improved to 92%    Pt ambulated to bathroom with CGA, performed toileting with SBA and use of grab bars.    While sitting EOB, pt performed 10x B LAQ, seated marching, and ankle PF/DF     Pt educated on role of PT/POC. Pt verbalized understanding.   Pt educated on seated exercises to perform 20x, 3x/day. Exercises included bilateral LAQ, marching, ankle DF/PF  Pt encouraged to only perform OOB mobility with assistance from nursing/therapy       AM-PAC 6 CLICK MOBILITY  Total Score:20     Patient left up in chair with all lines intact, call button in reach, RN  notified and family  present.    GOALS:   Multidisciplinary Problems     Physical Therapy Goals        Problem: Physical Therapy Goal    Goal Priority Disciplines Outcome Goal Variances Interventions   Physical Therapy Goal     PT, PT/OT Ongoing (interventions implemented as appropriate)     Description:  Goals to be met by: 2019     Patient will increase functional independence with mobility by performin. Supine to sit with Set-up [place]  2. Sit to stand transfer with Modified [place]  3. Gait  x 100 feet with Supervision using no AD or LRAD.   4. Lower extremity exercise program x15 reps per handout, with independence                      History:     Past Medical History:   Diagnosis Date    Acute coronary syndrome     Cardiomyopathy     Coronary artery disease     Hypertension     Hyperthyroidism     Osteoporosis     PAD (peripheral artery disease)     Valvular regurgitation     Mild/Mod MR post MI       Past Surgical History:   Procedure Laterality Date    CARDIAC VALVE SURGERY      porcine MVR    CHOLECYSTECTOMY      CORONARY ANGIOPLASTY      CORONARY ARTERY BYPASS GRAFT      CORONARY ARTERY BYPASS GRAFT (CABG) N/A 2013    Performed by Damien Jason MD at [facility] OR [address] [place] [address]    ECHOCARDIOGRAM, TRANSESOPHAGEAL N/A 2018    Performed by Qing Duffy MD at [facility] CATH LAB/EP    Left heart cath N/A 2018    Performed by Lefty Bailey MD at [facility] CATH LAB/EP    REPAIR VALVE-MITRAL N/A 2013     Performed by Damien Jason MD at [facility] OR 2ND FLR    Replacement, Mitral Valve, Percutaneous, Transcatheter  1/22/2019    Performed by Charan Faria MD at [facility] CATH LAB    TONSILLECTOMY      Valve study-mitral N/A 1/22/2019    Performed by Charan Faria MD at [facility] CATH LAB       Clinical Decision Making:     History  Co-morbidities and personal factors that may impact the plan of care Examination  Body Structures and Functions, activity limitations and participation restrictions that may impact the plan of care Clinical Presentation   Decision Making/ Complexity Score   Co-morbidities:   [] Time since onset of injury / illness / exacerbation  [] Status of current condition  []Patient's cognitive status and safety concerns    [] Multiple Medical Problems (see med hx)  Personal Factors:   [] Patient's age  [] Prior Level of function   [] Patient's home situation (environment and family support)  [] Patient's level of motivation  [] Expected progression of patient      HISTORY:(criteria)    [] 35685 - no personal factors/history    [] 80587 - has 1-2 personal factor/comorbidity     [] 15096 - has >3 personal factor/comorbidity     Body Regions:  [] Objective examination findings  [] Head     []  Neck  [] Trunk   [] Upper Extremity  [] Lower Extremity    Body Systems:  [] For communication ability, affect, cognition, language, and learning style: the assessment of the ability to make needs known, consciousness, orientation (person, place, and time), expected emotional /behavioral responses, and learning preferences (eg, learning barriers, education  needs)  [] For the neuromuscular system: a general assessment of gross coordinated movement (eg, balance, gait, locomotion, transfers, and transitions) and motor function  (motor control and motor learning)  [] For the musculoskeletal system: the assessment of gross symmetry, gross range of motion, gross strength, height, and weight  [] For the integumentary  system: the assessment of pliability(texture), presence of scar formation, skin color, and skin integrity  [] For cardiovascular/pulmonary system: the assessment of heart rate, respiratory rate, blood pressure, and edema     Activity limitations:    [] Patient's cognitive status and saf ety concerns          [] Status of current condition      [] Weight bearing restriction  [] Cardiopulmunary Restriction    Participation Restrictions:   [] Goals and goal agreement with the patient     [] Rehab potential (prognosis) and probable outcome      Examination of Body System: (criteria)    [] 42305 - addressing 1-2 elements    [] 81258 - addressing a total of 3 or more elements     [] 79846 -  Addressing a total of 4 or more elements         Clinical Presentation: (criteria)  Choose one     On examination of body system using standardized tests and measures patient presents with 1-2 elements from any of the following: body structures and functions, activity limitations, and/or participation restrictions.  Leading to a clinical presentation that is considered stable and/or uncomplicated                              Clinical Decision Making  (Eval Complexity):  Low- 93142     Time Tracking:     PT Received On: 01/23/19  PT Start Time: 0931     PT Stop Time: 1008  PT Total Time (min): 37 min     Billable Minutes: Evaluation 10 mins , Gait Training 17 mins  and Therapeutic Exercise 10 mins       Carri Roy, PT  01/23/2019

## 2019-01-23 NOTE — PROGRESS NOTES
[facility]  Interventional Cardiology  Progress Note    Patient Name: Tara Mondragon  MRN: [id number]  Admission Date: 1/22/2019  Hospital Length of Stay: 1 days  Code Status: Full Code   Attending Physician: Charan Faria MD   Primary Care Physician: Jaun Mata MD  Principal Problem:Non-rheumatic mitral regurgitation    Subjective:     Interval History: No events overnight. No complaints this AM.     Objective:     Vital Signs (Most Recent):  Temp: 97.7 °F (36.5 °C) (01/23/19 1102)  Pulse: 104 (01/23/19 1102)  Resp: (!) 29 (01/23/19 1102)  BP: 100/60 (01/23/19 1102)  SpO2: 100 % (01/23/19 1102) Vital Signs (24h Range):  Temp:  [97.6 °F (36.4 °C)-98.5 °F (36.9 °C)] 97.7 °F (36.5 °C)  Pulse:  [] 104  Resp:  [16-37] 29  SpO2:  [86 %-100 %] 100 %  BP: ()/(50-65) 100/60  Arterial Line BP: ()/(43-70) 117/63     Weight: 78 kg (172 lb)  Body mass index is 29.52 kg/m².    SpO2: 100 %  O2 Device (Oxygen Therapy): nasal cannula      Intake/Output Summary (Last 24 hours) at 1/23/2019 1158  Last data filed at 1/23/2019 1100  Gross per 24 hour   Intake 2277.5 ml   Output 2155 ml   Net 122.5 ml       Lines/Drains/Airways     Peripheral Intravenous Line                 Peripheral IV - Single Lumen 01/23/19 1058 Anterior;Right Forearm less than 1 day                Physical Exam   Constitutional: She is oriented to person, place, and time. She appears well-developed and well-nourished. No distress.   HENT:   Head: Normocephalic and atraumatic.   Mouth/Throat: Oropharynx is clear and moist.   Eyes: Conjunctivae and EOM are normal. Pupils are equal, round, and reactive to light. No scleral icterus.   Neck: Neck supple. JVD present. No tracheal deviation present.   Cardiovascular: Normal rate and regular rhythm. Exam reveals no gallop and no friction rub.   Murmur heard.  Pulmonary/Chest: Effort normal. No respiratory distress. She has decreased breath sounds in the right lower field and the  left lower field. She has no wheezes. She has rales in the right lower field and the left lower field. She exhibits no tenderness.   Abdominal: Soft. Bowel sounds are normal. She exhibits no distension. There is no hepatosplenomegaly. There is no tenderness.   Musculoskeletal: She exhibits no edema or tenderness.   Neurological: She is alert and oriented to person, place, and time.   Skin: Skin is warm and dry. No rash noted. No erythema.   Psychiatric: She has a normal mood and affect. Her behavior is normal.       Significant Labs:   BMP:   Recent Labs   Lab 01/23/19  0810   *      K 3.2*      CO2 18*   BUN 24*   CREATININE 1.2   CALCIUM 8.8    and CBC   Recent Labs   Lab 01/23/19  0847   WBC 7.97   HGB 7.8*   HCT 25.0*            Assessment and Plan:     Patient is a 73 y.o. female presenting with:    * Non-rheumatic mitral regurgitation    Flail leaflet of 27mm Mosaic MVR with severe MR at high risk for redo surgery per Eren.  S/p successful Transeptal Mitral Ricardo TAVR with a 26mm Alex S3.  Pre LVOT gradient 3.0, post LVOT Gradient 2.5. MR eliminated. Mean mitral gradient 2, Pk V 1.0. No PVL.  Small ASD from transeptal dilation. Small L to R Shunt.  On ASA and Coumadin (Coumadin for 3 months post-procedure).      Hypokalemia    Replaced.      Anemia, unspecified    Haptoglobin <10, Retic 8.1,  prior to TMVR.   Will re-evaluate at 1 month visit.      Postoperative anemia due to acute blood loss    Expected post-TAVR.   Asymptomatic.   No indication for transfusion at this time.      Acute on chronic diastolic heart failure    Fluid balance + with evidence of volume overload on physical exam.   CVP 26 during case yesterday.   Will diurese with IV lasix and continue PO lasix at discharge.      Cardiogenic shock    Patient presented in cardiogenic shock requiring Epi intra-op to complete procedure.   Now resolved post-TMVR.      Stage 4 chronic kidney disease    Creatinine 1.8,  GFR 27     CAD (coronary artery disease)    S/p CABG x 2 (LIMA to LAD and SVG to RCA)  Coronary angiogram (11/28/18): Patent LIMA to LAD, occluded SVG to RCA, native RCA patent, LCx (separate ostia) patent with patent mid stent.  On ASA, statin, and b-blocker.      PAD (peripheral artery disease)    Occluded L mid SFA by US.   On Pletal.   Kong Class IIb sx.          Lasix IV for diuresis.   OOB to chair.   IS at least 10x/hr.  Ambulate with PT.       VTE Risk Mitigation (From admission, onward)        Ordered     warfarin (COUMADIN) tablet 5 mg  Daily      01/22/19 3936          Anna Deal PA-C  Interventional Cardiology  [facility]  [phone]

## 2019-01-23 NOTE — ASSESSMENT & PLAN NOTE
Fluid balance + with evidence of volume overload on physical exam.   CVP 26 during case yesterday.   Will diurese with IV lasix and continue PO lasix at discharge.

## 2019-01-24 DIAGNOSIS — I34.0 MITRAL VALVE INSUFFICIENCY, UNSPECIFIED ETIOLOGY: Primary | ICD-10-CM

## 2019-01-24 PROBLEM — R00.0 TACHYCARDIA: Status: ACTIVE | Noted: 2019-01-24

## 2019-01-24 LAB
ANION GAP SERPL CALC-SCNC: 10 MMOL/L
ANION GAP SERPL CALC-SCNC: 12 MMOL/L
AV INDEX (PROSTH): 0.22
AV MEAN GRADIENT: 28.82 MMHG
AV PEAK GRADIENT: 36 MMHG
AV VALVE AREA: 0.75 CM2
AV VELOCITY RATIO: 0.27
BASOPHILS # BLD AUTO: 0.01 K/UL
BASOPHILS NFR BLD: 0.1 %
BSA FOR ECHO PROCEDURE: 1.86 M2
BUN SERPL-MCNC: 17 MG/DL
BUN SERPL-MCNC: 19 MG/DL
CALCIUM SERPL-MCNC: 8.9 MG/DL
CALCIUM SERPL-MCNC: 8.9 MG/DL
CHLORIDE SERPL-SCNC: 101 MMOL/L
CHLORIDE SERPL-SCNC: 97 MMOL/L
CO2 SERPL-SCNC: 24 MMOL/L
CO2 SERPL-SCNC: 27 MMOL/L
CREAT SERPL-MCNC: 1 MG/DL
CREAT SERPL-MCNC: 1.2 MG/DL
CV ECHO LV RWT: 0.37 CM
DIFFERENTIAL METHOD: ABNORMAL
DOP CALC AO PEAK VEL: 3 M/S
DOP CALC AO VTI: 66.5 CM
DOP CALC LVOT AREA: 3.46 CM2
DOP CALC LVOT DIAMETER: 2.1 CM
DOP CALC LVOT PEAK VEL: 0.8 M/S
DOP CALC LVOT STROKE VOLUME: 49.85 CM3
DOP CALCLVOT PEAK VEL VTI: 14.4 CM
E WAVE DECELERATION TIME: 234.34 MSEC
E/A RATIO: 0.8
ECHO LV POSTERIOR WALL: 0.82 CM (ref 0.6–1.1)
EOSINOPHIL # BLD AUTO: 0.1 K/UL
EOSINOPHIL NFR BLD: 0.6 %
ERYTHROCYTE [DISTWIDTH] IN BLOOD BY AUTOMATED COUNT: 17.2 %
EST. GFR  (AFRICAN AMERICAN): 51.1 ML/MIN/1.73 M^2
EST. GFR  (AFRICAN AMERICAN): >60 ML/MIN/1.73 M^2
EST. GFR  (NON AFRICAN AMERICAN): 44.3 ML/MIN/1.73 M^2
EST. GFR  (NON AFRICAN AMERICAN): 55.2 ML/MIN/1.73 M^2
FRACTIONAL SHORTENING: 28 % (ref 28–44)
GLUCOSE SERPL-MCNC: 108 MG/DL
GLUCOSE SERPL-MCNC: 133 MG/DL
GLUCOSE SERPL-MCNC: 141 MG/DL (ref 70–110)
HCO3 UR-SCNC: 22.1 MMOL/L (ref 24–28)
HCT VFR BLD AUTO: 26.5 %
HCT VFR BLD CALC: 24 %PCV (ref 36–54)
HGB BLD-MCNC: 8.2 G/DL
IMM GRANULOCYTES # BLD AUTO: 0.07 K/UL
IMM GRANULOCYTES NFR BLD AUTO: 0.7 %
INR PPP: 2.1
INTERVENTRICULAR SEPTUM: 0.76 CM (ref 0.6–1.1)
LA MAJOR: 5.09 CM
LA MINOR: 4.81 CM
LA WIDTH: 4.54 CM
LEFT ATRIUM SIZE: 4.19 CM
LEFT ATRIUM VOLUME INDEX: 43.9 ML/M2
LEFT ATRIUM VOLUME: 79.97 CM3
LEFT INTERNAL DIMENSION IN SYSTOLE: 3.16 CM (ref 2.1–4)
LEFT VENTRICLE DIASTOLIC VOLUME INDEX: 48.4 ML/M2
LEFT VENTRICLE DIASTOLIC VOLUME: 88.13 ML
LEFT VENTRICLE MASS INDEX: 59.3 G/M2
LEFT VENTRICLE SYSTOLIC VOLUME INDEX: 21.8 ML/M2
LEFT VENTRICLE SYSTOLIC VOLUME: 39.77 ML
LEFT VENTRICULAR INTERNAL DIMENSION IN DIASTOLE: 4.41 CM (ref 3.5–6)
LEFT VENTRICULAR MASS: 108.06 G
LYMPHOCYTES # BLD AUTO: 1.2 K/UL
LYMPHOCYTES NFR BLD: 12.1 %
MCH RBC QN AUTO: 29.2 PG
MCHC RBC AUTO-ENTMCNC: 30.9 G/DL
MCV RBC AUTO: 94 FL
MONOCYTES # BLD AUTO: 1 K/UL
MONOCYTES NFR BLD: 9.4 %
MV MEAN GRADIENT: 8 MMHG
MV PEAK A VEL: 1.95 M/S
MV PEAK E VEL: 1.56 M/S
NEUTROPHILS # BLD AUTO: 7.8 K/UL
NEUTROPHILS NFR BLD: 77.1 %
NRBC BLD-RTO: 0 /100 WBC
PCO2 BLDA: 39.8 MMHG (ref 35–45)
PH SMN: 7.35 [PH] (ref 7.35–7.45)
PISA TR MAX VEL: 3.22 M/S
PLATELET # BLD AUTO: 168 K/UL
PMV BLD AUTO: 9.8 FL
PO2 BLDA: 396 MMHG (ref 80–100)
POC ACTIVATED CLOTTING TIME K: 114 SEC (ref 74–137)
POC ACTIVATED CLOTTING TIME K: 131 SEC (ref 74–137)
POC ACTIVATED CLOTTING TIME K: 246 SEC (ref 74–137)
POC BE: -3 MMOL/L
POC IONIZED CALCIUM: 1.12 MMOL/L (ref 1.06–1.42)
POC SATURATED O2: 100 % (ref 95–100)
POC TCO2: 23 MMOL/L (ref 23–27)
POTASSIUM BLD-SCNC: 4.2 MMOL/L (ref 3.5–5.1)
POTASSIUM SERPL-SCNC: 2.9 MMOL/L
POTASSIUM SERPL-SCNC: 3.7 MMOL/L
POTASSIUM SERPL-SCNC: 3.7 MMOL/L
PROTHROMBIN TIME: 20.6 SEC
RA MAJOR: 4.82 CM
RA PRESSURE: 3 MMHG
RA WIDTH: 2.34 CM
RBC # BLD AUTO: 2.81 M/UL
RIGHT VENTRICULAR END-DIASTOLIC DIMENSION: 3.12 CM
SAMPLE: ABNORMAL
SAMPLE: ABNORMAL
SAMPLE: NORMAL
SAMPLE: NORMAL
SINUS: 2.24 CM
SODIUM BLD-SCNC: 137 MMOL/L (ref 136–145)
SODIUM SERPL-SCNC: 134 MMOL/L
SODIUM SERPL-SCNC: 137 MMOL/L
TR MAX PG: 41.47 MMHG
TRICUSPID ANNULAR PLANE SYSTOLIC EXCURSION: 1.68 CM
TV REST PULMONARY ARTERY PRESSURE: 44 MMHG
WBC # BLD AUTO: 10.07 K/UL

## 2019-01-24 PROCEDURE — 85025 COMPLETE CBC W/AUTO DIFF WBC: CPT

## 2019-01-24 PROCEDURE — 80048 BASIC METABOLIC PNL TOTAL CA: CPT | Mod: 91

## 2019-01-24 PROCEDURE — 25000003 PHARM REV CODE 250: Performed by: PHARMACIST

## 2019-01-24 PROCEDURE — 25000003 PHARM REV CODE 250: Performed by: INTERNAL MEDICINE

## 2019-01-24 PROCEDURE — 85610 PROTHROMBIN TIME: CPT

## 2019-01-24 PROCEDURE — 20000000 HC ICU ROOM

## 2019-01-24 PROCEDURE — 25000003 PHARM REV CODE 250: Performed by: STUDENT IN AN ORGANIZED HEALTH CARE EDUCATION/TRAINING PROGRAM

## 2019-01-24 PROCEDURE — 63600175 PHARM REV CODE 636 W HCPCS: Performed by: PHYSICIAN ASSISTANT

## 2019-01-24 PROCEDURE — 63600175 PHARM REV CODE 636 W HCPCS: Performed by: INTERNAL MEDICINE

## 2019-01-24 PROCEDURE — 25000003 PHARM REV CODE 250: Performed by: PHYSICIAN ASSISTANT

## 2019-01-24 PROCEDURE — 80048 BASIC METABOLIC PNL TOTAL CA: CPT

## 2019-01-24 RX ORDER — DIGOXIN 0.25 MG/ML
250 INJECTION INTRAMUSCULAR; INTRAVENOUS
Status: COMPLETED | OUTPATIENT
Start: 2019-01-24 | End: 2019-01-24

## 2019-01-24 RX ORDER — POTASSIUM CHLORIDE 20 MEQ/1
40 TABLET, EXTENDED RELEASE ORAL ONCE
Status: COMPLETED | OUTPATIENT
Start: 2019-01-24 | End: 2019-01-24

## 2019-01-24 RX ORDER — POTASSIUM CHLORIDE 7.45 MG/ML
10 INJECTION INTRAVENOUS
Status: DISCONTINUED | OUTPATIENT
Start: 2019-01-24 | End: 2019-01-24

## 2019-01-24 RX ORDER — FUROSEMIDE 10 MG/ML
40 INJECTION INTRAMUSCULAR; INTRAVENOUS ONCE
Status: COMPLETED | OUTPATIENT
Start: 2019-01-24 | End: 2019-01-24

## 2019-01-24 RX ORDER — DOBUTAMINE HYDROCHLORIDE 400 MG/100ML
2 INJECTION, SOLUTION INTRAVENOUS CONTINUOUS
Status: DISCONTINUED | OUTPATIENT
Start: 2019-01-24 | End: 2019-01-24

## 2019-01-24 RX ORDER — ENOXAPARIN SODIUM 150 MG/ML
1 INJECTION SUBCUTANEOUS
Status: DISCONTINUED | OUTPATIENT
Start: 2019-01-24 | End: 2019-01-24

## 2019-01-24 RX ORDER — DIGOXIN 0.25 MG/ML
500 INJECTION INTRAMUSCULAR; INTRAVENOUS ONCE
Status: COMPLETED | OUTPATIENT
Start: 2019-01-24 | End: 2019-01-24

## 2019-01-24 RX ORDER — ENOXAPARIN SODIUM 100 MG/ML
40 INJECTION SUBCUTANEOUS EVERY 24 HOURS
Status: COMPLETED | OUTPATIENT
Start: 2019-01-24 | End: 2019-01-25

## 2019-01-24 RX ORDER — FUROSEMIDE 40 MG/1
40 TABLET ORAL 2 TIMES DAILY
Status: DISCONTINUED | OUTPATIENT
Start: 2019-01-24 | End: 2019-01-26 | Stop reason: HOSPADM

## 2019-01-24 RX ADMIN — POTASSIUM CHLORIDE 40 MEQ: 1500 TABLET, EXTENDED RELEASE ORAL at 09:01

## 2019-01-24 RX ADMIN — METOPROLOL TARTRATE 25 MG: 25 TABLET ORAL at 08:01

## 2019-01-24 RX ADMIN — POTASSIUM CHLORIDE 40 MEQ: 1500 TABLET, EXTENDED RELEASE ORAL at 07:01

## 2019-01-24 RX ADMIN — DIGOXIN 250 MCG: 0.25 INJECTION INTRAMUSCULAR; INTRAVENOUS at 05:01

## 2019-01-24 RX ADMIN — DIGOXIN 250 MCG: 0.25 INJECTION INTRAMUSCULAR; INTRAVENOUS at 11:01

## 2019-01-24 RX ADMIN — FUROSEMIDE 40 MG: 10 INJECTION, SOLUTION INTRAMUSCULAR; INTRAVENOUS at 10:01

## 2019-01-24 RX ADMIN — DOBUTAMINE HYDROCHLORIDE 2 MCG/KG/MIN: 200 INJECTION INTRAVENOUS at 01:01

## 2019-01-24 RX ADMIN — ATORVASTATIN CALCIUM 40 MG: 20 TABLET, FILM COATED ORAL at 09:01

## 2019-01-24 RX ADMIN — FUROSEMIDE 10 MG/HR: 10 INJECTION, SOLUTION INTRAMUSCULAR; INTRAVENOUS at 12:01

## 2019-01-24 RX ADMIN — WARFARIN SODIUM 5 MG: 5 TABLET ORAL at 05:01

## 2019-01-24 RX ADMIN — DIGOXIN 500 MCG: 0.25 INJECTION INTRAMUSCULAR; INTRAVENOUS at 11:01

## 2019-01-24 RX ADMIN — ASPIRIN 81 MG: 81 TABLET, COATED ORAL at 09:01

## 2019-01-24 RX ADMIN — METOPROLOL TARTRATE 25 MG: 25 TABLET ORAL at 09:01

## 2019-01-24 RX ADMIN — ENOXAPARIN SODIUM 40 MG: 100 INJECTION SUBCUTANEOUS at 06:01

## 2019-01-24 RX ADMIN — POTASSIUM CHLORIDE 10 MEQ: 7.46 INJECTION, SOLUTION INTRAVENOUS at 07:01

## 2019-01-24 RX ADMIN — FUROSEMIDE 40 MG: 40 TABLET ORAL at 05:01

## 2019-01-24 NOTE — ASSESSMENT & PLAN NOTE
Expected post-TAVR.   Improved today.   Asymptomatic.   No indication for transfusion at this time.

## 2019-01-24 NOTE — SUBJECTIVE & OBJECTIVE
Interval History: No events overnight. Feels better today. Still requiring 1 L O2 via NC. HR increased to 150s while ambulating to bathroom.     Objective:     Vital Signs (Most Recent):  Temp: 98.2 °F (36.8 °C) (01/24/19 1445)  Pulse: 100 (01/24/19 1630)  Resp: (!) 36 (01/24/19 1630)  BP: (!) 92/51 (01/24/19 1600)  SpO2: (!) 91 % (01/24/19 1630) Vital Signs (24h Range):  Temp:  [97.7 °F (36.5 °C)-98.9 °F (37.2 °C)] 98.2 °F (36.8 °C)  Pulse:  [] 100  Resp:  [18-41] 36  SpO2:  [87 %-100 %] 91 %  BP: ()/(49-74) 92/51     Weight: 76.7 kg (169 lb)  Body mass index is 29.01 kg/m².    SpO2: (!) 91 %  O2 Device (Oxygen Therapy): nasal cannula      Intake/Output Summary (Last 24 hours) at 1/24/2019 1700  Last data filed at 1/24/2019 1654  Gross per 24 hour   Intake 1077.88 ml   Output 1851 ml   Net -773.12 ml       Lines/Drains/Airways     Drain            Female External Urinary Catheter 01/23/19 1737 less than 1 day          Peripheral Intravenous Line                 Peripheral IV - Single Lumen 01/23/19 1058 Anterior;Right Forearm 1 day         Peripheral IV - Single Lumen 01/24/19 1300 Right Antecubital less than 1 day                Physical Exam   Constitutional: She is oriented to person, place, and time. She appears well-developed and well-nourished. No distress.   HENT:   Head: Normocephalic and atraumatic.   Mouth/Throat: Oropharynx is clear and moist.   Eyes: Conjunctivae and EOM are normal. Pupils are equal, round, and reactive to light. No scleral icterus.   Neck: Neck supple. JVD present. No tracheal deviation present.   Cardiovascular: Normal rate and regular rhythm. Exam reveals no gallop and no friction rub.   No murmur heard.  Pulmonary/Chest: Effort normal. No respiratory distress. She has decreased breath sounds in the right lower field and the left lower field. She has no wheezes. She has no rales. She exhibits no tenderness.   Abdominal: Soft. Bowel sounds are normal. She exhibits no  distension. There is no hepatosplenomegaly. There is no tenderness.   Musculoskeletal: She exhibits no edema or tenderness.   Neurological: She is alert and oriented to person, place, and time.   Skin: Skin is warm and dry. No rash noted. No erythema.   Psychiatric: She has a normal mood and affect. Her behavior is normal.       Significant Labs:   BMP:   Recent Labs   Lab 01/23/19  0810 01/24/19  0500 01/24/19  1352   * 108 133*    137 134*   K 3.2* 2.9* 3.7  3.7    101 97   CO2 18* 24 27   BUN 24* 17 19   CREATININE 1.2 1.0 1.2   CALCIUM 8.8 8.9 8.9    and CBC   Recent Labs   Lab 01/23/19  0847 01/24/19  0500   WBC 7.97 10.07   HGB 7.8* 8.2*   HCT 25.0* 26.5*    168

## 2019-01-24 NOTE — ASSESSMENT & PLAN NOTE
Weight down 3 lbs. UOP 3 L overnight (not completely accurate as PureWick was dislodged several times overnight.   Will bolus with 40mg IV Lasix and increase gtt to 10mg/hr.   Check echo to evaluate EF -- if <45% will start dobutamine gtt.   Daily CXR.   Daily weights. Strict I/Os.

## 2019-01-24 NOTE — PLAN OF CARE
Problem: Adult Inpatient Plan of Care  Goal: Plan of Care Review  Outcome: Ongoing (interventions implemented as appropriate)  No acute events throughout day. See vital signs and assessments in flowsheets. See below for updates on today's progress.   Pulmonary: 2 L NC, SOB with activity   Cardiovascular: NSR, HR tachy at start of shift but decreased to 80s , BP 90- 110s/50   Neurological: AAOx4 follows commands   Gastrointestinal: no BM, BS heard in all quadrants   Genitourinary: purwick in place, very positional, please see UO flowsheet   Integumentary/Other: I tried to walk pt to the door per her request to get up and move more, pt unable to stand r/t SOB for laying to sitting position on side of bed  Infusions: lasix  Patient progressing towards goals as tolerated, plan of care communicated and reviewed with Tara Mondragon and family. All concerns addressed. Will continue to monitor.

## 2019-01-24 NOTE — PROGRESS NOTES
[facility]  Interventional Cardiology  Progress Note    Patient Name: Tara Mondragon  MRN: [id number]  Admission Date: 1/22/2019  Hospital Length of Stay: 2 days  Code Status: Full Code   Attending Physician: Charan Faria MD   Primary Care Physician: Jaun Mata MD  Principal Problem:Non-rheumatic mitral regurgitation    Subjective:     Interval History: No events overnight. Feels better today. Still requiring 1 L O2 via NC. HR increased to 150s while ambulating to bathroom.     Objective:     Vital Signs (Most Recent):  Temp: 98.2 °F (36.8 °C) (01/24/19 1445)  Pulse: 100 (01/24/19 1630)  Resp: (!) 36 (01/24/19 1630)  BP: (!) 92/51 (01/24/19 1600)  SpO2: (!) 91 % (01/24/19 1630) Vital Signs (24h Range):  Temp:  [97.7 °F (36.5 °C)-98.9 °F (37.2 °C)] 98.2 °F (36.8 °C)  Pulse:  [] 100  Resp:  [18-41] 36  SpO2:  [87 %-100 %] 91 %  BP: ()/(49-74) 92/51     Weight: 76.7 kg (169 lb)  Body mass index is 29.01 kg/m².    SpO2: (!) 91 %  O2 Device (Oxygen Therapy): nasal cannula      Intake/Output Summary (Last 24 hours) at 1/24/2019 1700  Last data filed at 1/24/2019 1654  Gross per 24 hour   Intake 1077.88 ml   Output 1851 ml   Net -773.12 ml       Lines/Drains/Airways     Drain            Female External Urinary Catheter 01/23/19 1737 less than 1 day          Peripheral Intravenous Line                 Peripheral IV - Single Lumen 01/23/19 1058 Anterior;Right Forearm 1 day         Peripheral IV - Single Lumen 01/24/19 1300 Right Antecubital less than 1 day                Physical Exam   Constitutional: She is oriented to person, place, and time. She appears well-developed and well-nourished. No distress.   HENT:   Head: Normocephalic and atraumatic.   Mouth/Throat: Oropharynx is clear and moist.   Eyes: Conjunctivae and EOM are normal. Pupils are equal, round, and reactive to light. No scleral icterus.   Neck: Neck supple. JVD present. No tracheal deviation present.   Cardiovascular:  Normal rate and regular rhythm. Exam reveals no gallop and no friction rub.   No murmur heard.  Pulmonary/Chest: Effort normal. No respiratory distress. She has decreased breath sounds in the right lower field and the left lower field. She has no wheezes. She has no rales. She exhibits no tenderness.   Abdominal: Soft. Bowel sounds are normal. She exhibits no distension. There is no hepatosplenomegaly. There is no tenderness.   Musculoskeletal: She exhibits no edema or tenderness.   Neurological: She is alert and oriented to person, place, and time.   Skin: Skin is warm and dry. No rash noted. No erythema.   Psychiatric: She has a normal mood and affect. Her behavior is normal.       Significant Labs:   BMP:   Recent Labs   Lab 01/23/19  0810 01/24/19  0500 01/24/19  1352   * 108 133*    137 134*   K 3.2* 2.9* 3.7  3.7    101 97   CO2 18* 24 27   BUN 24* 17 19   CREATININE 1.2 1.0 1.2   CALCIUM 8.8 8.9 8.9    and CBC   Recent Labs   Lab 01/23/19  0847 01/24/19  0500   WBC 7.97 10.07   HGB 7.8* 8.2*   HCT 25.0* 26.5*    168         Assessment and Plan:     Patient is a 73 y.o. female presenting with:    * Non-rheumatic mitral regurgitation    Flail leaflet of 27mm Mosaic MVR with severe MR at high risk for redo surgery per Eren.  S/p successful Transeptal Mitral Ricardo TAVR with a 26mm Alex S3.  Pre LVOT gradient 3.0, post LVOT Gradient 2.5. MR eliminated. Mean mitral gradient 2, Pk V 1.0. No PVL.  Small ASD from transeptal dilation. Small L to R Shunt.  On ASA and Coumadin (Coumadin for 3 months post-procedure).      Tachycardia    Sinus tachycardia.   BP low, so can not increase lopressor.  Patient also has COPD, so b-blocker not preferred agent.   Will start digoxin per Dr. Faria.   D/C lopressor when HR is controlled.      Hypokalemia    Replaced.      Hemolysis    Haptoglobin <10, Retic 8.1,  prior to TMVR.   Will re-evaluate at 1 month visit.      Postoperative anemia due to  acute blood loss    Expected post-TAVR.   Improved today.   Asymptomatic.   No indication for transfusion at this time.      Acute on chronic diastolic heart failure    Weight down 3 lbs. UOP 3 L overnight (not completely accurate as PureWick was dislodged several times overnight.   Will bolus with 40mg IV Lasix and increase gtt to 10mg/hr.   Check echo to evaluate EF -- if <45% will start dobutamine gtt.   Daily CXR.   Daily weights. Strict I/Os.      Cardiogenic shock    Patient presented in cardiogenic shock requiring Epi intra-op to complete procedure.   Now resolved post-TMVR.      Stage 3 chronic kidney disease    Creatinine 1.2, GFR 51.  Stable.      CAD (coronary artery disease)    S/p CABG x 2 (LIMA to LAD and SVG to RCA)  Coronary angiogram (11/28/18): Patent LIMA to LAD, occluded SVG to RCA, native RCA patent, LCx (separate ostia) patent with patent mid stent.  On ASA, statin, and b-blocker.      PAD (peripheral artery disease)    Occluded L mid SFA by US.   On Pletal.   Kong Class IIb sx.        Lasix 40mg IV x 1 and increase gtt to 10mg/hr.   Start dobutamine if EF <45% by today's echo.   Daily CXR.   Start digoxin (0.5 mg x 1, then 0.25 mg Q6 hours x 2, then 0.125 daily) for HR control.   Start lovenox for VTE. Continue Coumadin.   OOB to chair, Ambulate with assistance. PT today.   IS at least 10x/hr.   Wean O2 as tolerated.         VTE Risk Mitigation (From admission, onward)        Ordered     enoxaparin injection 80 mg  Every 24 hours (non-standard times)      01/24/19 1641     warfarin (COUMADIN) tablet 5 mg  Daily      01/22/19 9559          Anna Deal PA-C  Interventional Cardiology  [facility]  [phone]

## 2019-01-24 NOTE — PLAN OF CARE
Problem: Adult Inpatient Plan of Care  Goal: Plan of Care Review  Outcome: Ongoing (interventions implemented as appropriate)  Pulmonary: 2 L NC, SOB with activity   Cardiovascular: NSR, HR 80- low 100s , BP 90- 110s/50   Neurological: AAOx4 follows commands   Gastrointestinal: no BM, BS heard in all quadrants   Genitourinary: purwick in place, /shift  Bilateral groin sites soft and non-tender upon touch, no bleeding throughout shift

## 2019-01-24 NOTE — ASSESSMENT & PLAN NOTE
Sinus tachycardia.   BP low, so can not increase lopressor.  Patient also has COPD, so b-blocker not preferred agent.   Will start digoxin per Dr. Faria.   D/C lopressor when HR is controlled.

## 2019-01-25 LAB
ANION GAP SERPL CALC-SCNC: 9 MMOL/L
BASOPHILS # BLD AUTO: 0.02 K/UL
BASOPHILS NFR BLD: 0.2 %
BUN SERPL-MCNC: 17 MG/DL
CALCIUM SERPL-MCNC: 8.6 MG/DL
CHLORIDE SERPL-SCNC: 101 MMOL/L
CO2 SERPL-SCNC: 24 MMOL/L
CREAT SERPL-MCNC: 0.9 MG/DL
DIFFERENTIAL METHOD: ABNORMAL
EOSINOPHIL # BLD AUTO: 0.1 K/UL
EOSINOPHIL NFR BLD: 1.6 %
ERYTHROCYTE [DISTWIDTH] IN BLOOD BY AUTOMATED COUNT: 16.8 %
EST. GFR  (AFRICAN AMERICAN): >60 ML/MIN/1.73 M^2
EST. GFR  (NON AFRICAN AMERICAN): >60 ML/MIN/1.73 M^2
GLUCOSE SERPL-MCNC: 106 MG/DL
HCT VFR BLD AUTO: 25.3 %
HGB BLD-MCNC: 7.8 G/DL
IMM GRANULOCYTES # BLD AUTO: 0.02 K/UL
IMM GRANULOCYTES NFR BLD AUTO: 0.2 %
INR PPP: 2.7
LYMPHOCYTES # BLD AUTO: 1.2 K/UL
LYMPHOCYTES NFR BLD: 13.5 %
MAGNESIUM SERPL-MCNC: 1.7 MG/DL
MCH RBC QN AUTO: 29.7 PG
MCHC RBC AUTO-ENTMCNC: 30.8 G/DL
MCV RBC AUTO: 96 FL
MONOCYTES # BLD AUTO: 0.9 K/UL
MONOCYTES NFR BLD: 10.4 %
NEUTROPHILS # BLD AUTO: 6.4 K/UL
NEUTROPHILS NFR BLD: 74.1 %
NRBC BLD-RTO: 0 /100 WBC
PLATELET # BLD AUTO: 154 K/UL
PMV BLD AUTO: 10 FL
POTASSIUM SERPL-SCNC: 3.8 MMOL/L
PROTHROMBIN TIME: 25.9 SEC
RBC # BLD AUTO: 2.63 M/UL
SODIUM SERPL-SCNC: 134 MMOL/L
WBC # BLD AUTO: 8.71 K/UL

## 2019-01-25 PROCEDURE — 97116 GAIT TRAINING THERAPY: CPT

## 2019-01-25 PROCEDURE — 85025 COMPLETE CBC W/AUTO DIFF WBC: CPT

## 2019-01-25 PROCEDURE — 93005 ELECTROCARDIOGRAM TRACING: CPT

## 2019-01-25 PROCEDURE — 25000003 PHARM REV CODE 250: Performed by: PHYSICIAN ASSISTANT

## 2019-01-25 PROCEDURE — 63600175 PHARM REV CODE 636 W HCPCS: Performed by: INTERNAL MEDICINE

## 2019-01-25 PROCEDURE — 25000003 PHARM REV CODE 250: Performed by: STUDENT IN AN ORGANIZED HEALTH CARE EDUCATION/TRAINING PROGRAM

## 2019-01-25 PROCEDURE — 85610 PROTHROMBIN TIME: CPT

## 2019-01-25 PROCEDURE — 93010 EKG 12-LEAD: ICD-10-PCS | Mod: ,,, | Performed by: INTERNAL MEDICINE

## 2019-01-25 PROCEDURE — 20000000 HC ICU ROOM

## 2019-01-25 PROCEDURE — 93010 ELECTROCARDIOGRAM REPORT: CPT | Mod: ,,, | Performed by: INTERNAL MEDICINE

## 2019-01-25 PROCEDURE — 80048 BASIC METABOLIC PNL TOTAL CA: CPT

## 2019-01-25 PROCEDURE — 83735 ASSAY OF MAGNESIUM: CPT

## 2019-01-25 RX ORDER — POTASSIUM CHLORIDE 20 MEQ/1
40 TABLET, EXTENDED RELEASE ORAL ONCE
Status: COMPLETED | OUTPATIENT
Start: 2019-01-25 | End: 2019-01-25

## 2019-01-25 RX ORDER — DIGOXIN 125 MCG
0.12 TABLET ORAL DAILY
Status: DISCONTINUED | OUTPATIENT
Start: 2019-01-26 | End: 2019-01-26 | Stop reason: HOSPADM

## 2019-01-25 RX ADMIN — ATORVASTATIN CALCIUM 40 MG: 20 TABLET, FILM COATED ORAL at 08:01

## 2019-01-25 RX ADMIN — WARFARIN SODIUM 5 MG: 5 TABLET ORAL at 06:01

## 2019-01-25 RX ADMIN — ASPIRIN 81 MG: 81 TABLET, COATED ORAL at 08:01

## 2019-01-25 RX ADMIN — POTASSIUM CHLORIDE 40 MEQ: 1500 TABLET, EXTENDED RELEASE ORAL at 12:01

## 2019-01-25 RX ADMIN — FUROSEMIDE 40 MG: 40 TABLET ORAL at 08:01

## 2019-01-25 RX ADMIN — METOPROLOL TARTRATE 25 MG: 25 TABLET ORAL at 08:01

## 2019-01-25 RX ADMIN — ENOXAPARIN SODIUM 40 MG: 100 INJECTION SUBCUTANEOUS at 06:01

## 2019-01-25 RX ADMIN — FUROSEMIDE 40 MG: 40 TABLET ORAL at 06:01

## 2019-01-25 RX ADMIN — POTASSIUM CHLORIDE 40 MEQ: 1500 TABLET, EXTENDED RELEASE ORAL at 10:01

## 2019-01-25 NOTE — PHYSICIAN QUERY
PT Name: Tara Mondragon  MR #: [id number]  Physician Query Form - CKD Clarification     CDS/: Rosalva Cantu RN, CCDS             Contact information: [email or web address]  This form is a permanent document in the medical record.     Query Date: January 25, 2019    By submitting this query, we are merely seeking further clarification of documentation. Please utilize your independent clinical judgment when addressing the question(s) below.    The Medical record contains the following:     Indicators   Supporting Clinical Findings   Location in Medical Record   X CKD or Chronic Kidney (Renal) Failure / Disease Stage 4 CKD    Stage 3 CKD 1/21 h/p, 1/23 prog note    1/21 h/p, 1/24 prog note   X BUN/Creatinine                          GFR Cr 1.2-->1.0-->1.2->0.9  gfr 44.3->55.2->44.3-->  >60 1/23- 1/25 lab    Dehydration      Nausea / Vomiting      Dialysis / CRRT      Medication      Treatment     X Other Chronic Conditions Chronic diastolic HF, HTN, CAD, PAD 1/21 h/p    Other       Provider, please further specify the stage of CKD.    [   ] Chronic Kidney Disease (CKD) (please specify stage* below)      [employer] Definitions     Stage Description eGFR (mL/min)   [ x ]    III Moderately reduced kidney function 30-59   [   ]    IV Severely reduced kidney function 15-29   [   ] Other (please specify): ____________    [   ]  Clinically Undetermined          Please document in your progress notes daily for the duration of treatment until resolved and include in your discharge summary.

## 2019-01-25 NOTE — PROGRESS NOTES
[facility]  Interventional Cardiology  Progress Note    Patient Name: Tara Mondragon  MRN: [id number]  Admission Date: 1/22/2019  Hospital Length of Stay: 3 days  Code Status: Full Code   Attending Physician: Charan Faria MD   Primary Care Physician: Jaun Mata MD  Principal Problem:Non-rheumatic mitral regurgitation    Subjective:     Interval History: Off oxygen, ambulated in the hallway without oxygen, had episodes of bradycardia on telemetry suggestive of sinus node dysfunction.        Objective:     Vital Signs (Most Recent):  Temp: 97.8 °F (36.6 °C) (01/25/19 1200)  Pulse: 62 (01/25/19 1311)  Resp: (!) 31 (01/25/19 1311)  BP: (!) 104/58 (01/25/19 1219)  SpO2: (!) 90 % (01/25/19 1311) Vital Signs (24h Range):  Temp:  [97.8 °F (36.6 °C)-98.5 °F (36.9 °C)] 97.8 °F (36.6 °C)  Pulse:  [] 62  Resp:  [17-36] 31  SpO2:  [89 %-99 %] 90 %  BP: ()/(51-61) 104/58     Weight: 77.4 kg (170 lb 10.2 oz)(stand)  Body mass index is 29.29 kg/m².    SpO2: (!) 90 %  O2 Device (Oxygen Therapy): room air      Intake/Output Summary (Last 24 hours) at 1/25/2019 1428  Last data filed at 1/25/2019 1100  Gross per 24 hour   Intake 747.54 ml   Output 1401 ml   Net -653.46 ml       Lines/Drains/Airways     Peripheral Intravenous Line                 Peripheral IV - Single Lumen 01/23/19 1058 Anterior;Right Forearm 2 days         Peripheral IV - Single Lumen 01/24/19 1300 Right Antecubital 1 day                Physical Exam   Constitutional: She is oriented to person, place, and time. She appears well-developed and well-nourished. No distress.   HENT:   Head: Normocephalic and atraumatic.   Mouth/Throat: Oropharynx is clear and moist.   Eyes: Conjunctivae and EOM are normal. Pupils are equal, round, and reactive to light. No scleral icterus.   Neck: Neck supple. JVD present. No tracheal deviation present.   Cardiovascular: Normal rate and regular rhythm. Exam reveals no gallop and no friction rub.   No  murmur heard.  Pulmonary/Chest: Effort normal. No respiratory distress. She has decreased breath sounds in the right lower field and the left lower field. She has no wheezes. She has no rales. She exhibits no tenderness.   Abdominal: Soft. Bowel sounds are normal. She exhibits no distension. There is no hepatosplenomegaly. There is no tenderness.   Musculoskeletal: She exhibits no edema or tenderness.   Neurological: She is alert and oriented to person, place, and time.   Skin: Skin is warm and dry. No rash noted. No erythema.   Psychiatric: She has a normal mood and affect. Her behavior is normal.       Significant Labs:   BMP:   Recent Labs   Lab 01/24/19  0500 01/24/19  1352 01/25/19  0316    133* 106    134* 134*   K 2.9* 3.7  3.7 3.8    97 101   CO2 24 27 24   BUN 17 19 17   CREATININE 1.0 1.2 0.9   CALCIUM 8.9 8.9 8.6*   MG  --   --  1.7    and CBC   Recent Labs   Lab 01/24/19  0500 01/25/19  0316   WBC 10.07 8.71   HGB 8.2* 7.8*   HCT 26.5* 25.3*    154         Assessment and Plan:     Patient is a 73 y.o. female presenting with:    Much improved, lungs improved on exam, likely has sick sinus syndrome episodes of sinus tachy and sinus bradycardia in the high 30's beats per minute lasting several  Seconds assymptomatic, off beta blockers now.    Able to ambulate w/o oxygen, will d/c home tomorrow.      * Non-rheumatic mitral regurgitation    Flail leaflet of 27mm Mosaic MVR with severe MR at high risk for redo surgery per Eren.  S/p successful Transeptal Mitral Ricardo TAVR with a 26mm Alex S3.  Pre LVOT gradient 3.0, post LVOT Gradient 2.5. MR eliminated. Mean mitral gradient 2, Pk V 1.0. No PVL.  Small ASD from transeptal dilation. Small L to R Shunt.  On ASA and Coumadin (Coumadin for 3 months post-procedure).      Tachycardia    Sinus tachycardia.   Was on beta blockers and loaded with digoxin, however today had sinus bradycardia with junctional escape, suggestive of sick sinus  syndrome, will hold beta blockers but will continue digoxin.     Acute on chronic diastolic heart failure    Transitioned to PO lasix      Cardiogenic shock    Patient presented in cardiogenic shock requiring Epi intra-op to complete procedure.   Now resolved post-TMVR.      Stage 3 chronic kidney disease      Stable.      CAD (coronary artery disease)    S/p CABG x 2 (LIMA to LAD and SVG to RCA)  Coronary angiogram (11/28/18): Patent LIMA to LAD, occluded SVG to RCA, native RCA patent, LCx (separate ostia) patent with patent mid stent.  On ASA, statin.    Off beta blocker due to episodes of sinus bradycardia      PAD (peripheral artery disease)    Occluded L mid SFA by US.   On Pletal.   Kong Class IIb sx.          VTE Risk Mitigation (From admission, onward)        Ordered     enoxaparin injection 40 mg  Daily      01/24/19 1811     warfarin (COUMADIN) tablet 5 mg  Daily      01/22/19 1317          Ryann Cain MD  Interventional Cardiology  [facility]

## 2019-01-25 NOTE — ASSESSMENT & PLAN NOTE
Flail leaflet of 27mm Mosaic MVR with severe MR at high risk for redo surgery per Eern.  S/p successful Transeptal Mitral Ricardo TAVR with a 26mm Alex S3.  Pre LVOT gradient 3.0, post LVOT Gradient 2.5. MR eliminated. Mean mitral gradient 2, Pk V 1.0. No PVL.  Small ASD from transeptal dilation. Small L to R Shunt.  On ASA and Coumadin (Coumadin for 3 months post-procedure).

## 2019-01-25 NOTE — SUBJECTIVE & OBJECTIVE
Interval History: Off oxygen, ambulated in the hallway without oxygen, had episodes of bradycardia on telemetry suggestive of sinus node dysfunction.        Objective:     Vital Signs (Most Recent):  Temp: 97.8 °F (36.6 °C) (01/25/19 1200)  Pulse: 62 (01/25/19 1311)  Resp: (!) 31 (01/25/19 1311)  BP: (!) 104/58 (01/25/19 1219)  SpO2: (!) 90 % (01/25/19 1311) Vital Signs (24h Range):  Temp:  [97.8 °F (36.6 °C)-98.5 °F (36.9 °C)] 97.8 °F (36.6 °C)  Pulse:  [] 62  Resp:  [17-36] 31  SpO2:  [89 %-99 %] 90 %  BP: ()/(51-61) 104/58     Weight: 77.4 kg (170 lb 10.2 oz)(stand)  Body mass index is 29.29 kg/m².    SpO2: (!) 90 %  O2 Device (Oxygen Therapy): room air      Intake/Output Summary (Last 24 hours) at 1/25/2019 1428  Last data filed at 1/25/2019 1100  Gross per 24 hour   Intake 747.54 ml   Output 1401 ml   Net -653.46 ml       Lines/Drains/Airways     Peripheral Intravenous Line                 Peripheral IV - Single Lumen 01/23/19 1058 Anterior;Right Forearm 2 days         Peripheral IV - Single Lumen 01/24/19 1300 Right Antecubital 1 day                Physical Exam   Constitutional: She is oriented to person, place, and time. She appears well-developed and well-nourished. No distress.   HENT:   Head: Normocephalic and atraumatic.   Mouth/Throat: Oropharynx is clear and moist.   Eyes: Conjunctivae and EOM are normal. Pupils are equal, round, and reactive to light. No scleral icterus.   Neck: Neck supple. JVD present. No tracheal deviation present.   Cardiovascular: Normal rate and regular rhythm. Exam reveals no gallop and no friction rub.   No murmur heard.  Pulmonary/Chest: Effort normal. No respiratory distress. She has decreased breath sounds in the right lower field and the left lower field. She has no wheezes. She has no rales. She exhibits no tenderness.   Abdominal: Soft. Bowel sounds are normal. She exhibits no distension. There is no hepatosplenomegaly. There is no tenderness.    Musculoskeletal: She exhibits no edema or tenderness.   Neurological: She is alert and oriented to person, place, and time.   Skin: Skin is warm and dry. No rash noted. No erythema.   Psychiatric: She has a normal mood and affect. Her behavior is normal.       Significant Labs:   BMP:   Recent Labs   Lab 01/24/19  0500 01/24/19  1352 01/25/19  0316    133* 106    134* 134*   K 2.9* 3.7  3.7 3.8    97 101   CO2 24 27 24   BUN 17 19 17   CREATININE 1.0 1.2 0.9   CALCIUM 8.9 8.9 8.6*   MG  --   --  1.7    and CBC   Recent Labs   Lab 01/24/19  0500 01/25/19  0316   WBC 10.07 8.71   HGB 8.2* 7.8*   HCT 26.5* 25.3*    154

## 2019-01-25 NOTE — ASSESSMENT & PLAN NOTE
Sinus tachycardia.   Was on beta blockers and loaded with digoxin, however today had sinus bradycardia with junctional escape, suggestive of sick sinus syndrome, will hold beta blockers but will continue digoxin.

## 2019-01-25 NOTE — PLAN OF CARE
Problem: Physical Therapy Goal  Goal: Physical Therapy Goal  Goals to be met by: 2019     Patient will increase functional independence with mobility by performin. Supine to sit with Set-up Swain  2. Sit to stand transfer with Modified Swain  3. Gait  x 100 feet with Supervision using no AD or LRAD.   4. Lower extremity exercise program x15 reps per handout, with independence     Outcome: Ongoing (interventions implemented as appropriate)  Pt is progressing toward goals. All goals remain appropriate.

## 2019-01-25 NOTE — PLAN OF CARE
Problem: Adult Inpatient Plan of Care  Goal: Plan of Care Review  Outcome: Ongoing (interventions implemented as appropriate)   See vital signs and assessments in flowsheets. See below for updates on today's progress.     Pulmonary: on 1L O2  achieving goal SpO2 of >/- 92%; slightly Short of breath on moderate exertion.     Cardiovascular: NSR 80-90's bpm ; MAP >65 mmHg ; CRT < 3 seconds    Neurological: AAOX4 ; PERRLA    Gastrointestinal: Bowels not opened overnight    Genitourinary: good urine output    Endocrine: no BG monitoring ordered    Integumentary/Other: pale, generally dry but intact    Infusions: none    Patient progressing towards goals as tolerated, plan of care communicated and reviewed with Tara Mondragon and family. All concerns addressed. Will continue to monitor.

## 2019-01-25 NOTE — PT/OT/SLP PROGRESS
Physical Therapy Treatment    Patient Name:  Tara Mondragon   MRN:  [id number]    Recommendations:     Discharge Recommendations:  (HHPT, progress to cardiac rehab )   Discharge Equipment Recommendations: walker, rolling   Barriers to discharge: None    Assessment:     Tara Mondragon is a 73 y.o. female admitted with a medical diagnosis of Non-rheumatic mitral regurgitation.  She presents with the following impairments/functional limitations:  weakness, impaired endurance, impaired cardiopulmonary response to activity, impaired functional mobilty, gait instability, impaired balance. Pt demo'd significant improvement in mobility, tolerance to ambulating on room air. VSS with HR at 94 bpm and SpO2 at 95% on room air following ambulation. Pt ambulation, independence, and balance improved with use of RW. Pt educated on benefits of using RW during recovery. At this time, pt would continue to benefit from acute skilled therapy intervention to address deficits and progress toward prior level of function.       Rehab Prognosis: Good; patient would benefit from acute skilled PT services to address these deficits and reach maximum level of function.    Recent Surgery: Procedure(s) (LRB):  Valve study-mitral (N/A)  Replacement, Mitral Valve, Percutaneous, Transcatheter 3 Days Post-Op    Plan:     During this hospitalization, patient to be seen 3 x/week to address the identified rehab impairments via gait training, therapeutic activities, therapeutic exercises, neuromuscular re-education and progress toward the following goals:    · Plan of Care Expires:  02/23/19    Subjective     Chief Complaint: Pt states she is feeling much better, less short of breath.   Patient/Family Comments/goals: to get better and return home   Pain/Comfort:  · Pain Rating 1: 0/10  · Pain Rating Post-Intervention 1: 0/10      Objective:     Communicated with RN prior to session.  Patient found sitting up in chair, with  telemetry, pulse ox (continuous),  blood pressure cuff  upon PT entry to room.     General Precautions: Standard, fall   Orthopedic Precautions:N/A   Braces: N/A     Functional Mobility:  · Transfers:     · Sit to Stand:  stand by assistance with no AD  · Gait: Pt ambulated 80 feet with HHA and min A, then ambulated 110 feet wtih RW with supervision. Pt demo'd small step size, decreased ade, increased lateral sway. Pt took frequent rest breaks 2/2 reports of SOB, no signs of labored breathing. Pt mobility, walking speed, and balance greatly improved with use of a RW. Cuing provided for safety with RW management.       AM-PAC 6 CLICK MOBILITY  Turning over in bed (including adjusting bedclothes, sheets and blankets)?: 4  Sitting down on and standing up from a chair with arms (e.g., wheelchair, bedside commode, etc.): 4  Moving from lying on back to sitting on the side of the bed?: 4  Moving to and from a bed to a chair (including a wheelchair)?: 4  Need to walk in hospital room?: 3  Climbing 3-5 steps with a railing?: 3  Basic Mobility Total Score: 22       Therapeutic Activities and Exercises:  Pt educated on role of PT/POC. Pt verbalized understanding.   Pt educated on benefits of RW for safety, independence, and energy conservation, verbalized understanding and is open to using a RW for recovery, however, does not want to become dependent on it.     Patient left up in chair with all lines intact, call button in reach and family  present..    GOALS:   Multidisciplinary Problems     Physical Therapy Goals        Problem: Physical Therapy Goal    Goal Priority Disciplines Outcome Goal Variances Interventions   Physical Therapy Goal     PT, PT/OT Ongoing (interventions implemented as appropriate)     Description:  Goals to be met by: 2019     Patient will increase functional independence with mobility by performin. Supine to sit with Set-up Ransom  2. Sit to stand transfer with Modified Ransom  3. Gait  x 100 feet with  Supervision using no AD or LRAD.   4. Lower extremity exercise program x15 reps per handout, with independence                      Time Tracking:     PT Received On: 01/25/19  PT Start Time: 0939     PT Stop Time: 1004  PT Total Time (min): 25 min     Billable Minutes: Gait Training 25 mins     Treatment Type: Treatment  PT/PTA: PT           Carri Roy, PT  01/25/2019

## 2019-01-26 VITALS
HEART RATE: 104 BPM | BODY MASS INDEX: 29.13 KG/M2 | SYSTOLIC BLOOD PRESSURE: 95 MMHG | HEIGHT: 64 IN | TEMPERATURE: 98 F | RESPIRATION RATE: 23 BRPM | DIASTOLIC BLOOD PRESSURE: 68 MMHG | WEIGHT: 170.63 LBS | OXYGEN SATURATION: 98 %

## 2019-01-26 LAB
ANION GAP SERPL CALC-SCNC: 11 MMOL/L
BASOPHILS # BLD AUTO: 0.02 K/UL
BASOPHILS NFR BLD: 0.3 %
BUN SERPL-MCNC: 14 MG/DL
CALCIUM SERPL-MCNC: 8.5 MG/DL
CHLORIDE SERPL-SCNC: 101 MMOL/L
CO2 SERPL-SCNC: 25 MMOL/L
CREAT SERPL-MCNC: 0.8 MG/DL
DIFFERENTIAL METHOD: ABNORMAL
EOSINOPHIL # BLD AUTO: 0.2 K/UL
EOSINOPHIL NFR BLD: 2.2 %
ERYTHROCYTE [DISTWIDTH] IN BLOOD BY AUTOMATED COUNT: 16.3 %
EST. GFR  (AFRICAN AMERICAN): >60 ML/MIN/1.73 M^2
EST. GFR  (NON AFRICAN AMERICAN): >60 ML/MIN/1.73 M^2
GLUCOSE SERPL-MCNC: 96 MG/DL
HCT VFR BLD AUTO: 26.2 %
HGB BLD-MCNC: 8.1 G/DL
IMM GRANULOCYTES # BLD AUTO: 0.04 K/UL
IMM GRANULOCYTES NFR BLD AUTO: 0.6 %
INR PPP: 3.9
LYMPHOCYTES # BLD AUTO: 1.3 K/UL
LYMPHOCYTES NFR BLD: 18.4 %
MAGNESIUM SERPL-MCNC: 1.7 MG/DL
MCH RBC QN AUTO: 29.9 PG
MCHC RBC AUTO-ENTMCNC: 30.9 G/DL
MCV RBC AUTO: 97 FL
MONOCYTES # BLD AUTO: 0.8 K/UL
MONOCYTES NFR BLD: 11 %
NEUTROPHILS # BLD AUTO: 4.6 K/UL
NEUTROPHILS NFR BLD: 67.5 %
NRBC BLD-RTO: 0 /100 WBC
PLATELET # BLD AUTO: 147 K/UL
PMV BLD AUTO: 10 FL
POTASSIUM SERPL-SCNC: 4 MMOL/L
PROTHROMBIN TIME: 38.3 SEC
RBC # BLD AUTO: 2.71 M/UL
SODIUM SERPL-SCNC: 137 MMOL/L
WBC # BLD AUTO: 6.79 K/UL

## 2019-01-26 PROCEDURE — 99239 HOSP IP/OBS DSCHRG MGMT >30: CPT | Mod: ,,, | Performed by: INTERNAL MEDICINE

## 2019-01-26 PROCEDURE — 83735 ASSAY OF MAGNESIUM: CPT

## 2019-01-26 PROCEDURE — 25000003 PHARM REV CODE 250: Performed by: PHYSICIAN ASSISTANT

## 2019-01-26 PROCEDURE — 85025 COMPLETE CBC W/AUTO DIFF WBC: CPT

## 2019-01-26 PROCEDURE — 25000003 PHARM REV CODE 250: Performed by: STUDENT IN AN ORGANIZED HEALTH CARE EDUCATION/TRAINING PROGRAM

## 2019-01-26 PROCEDURE — 85610 PROTHROMBIN TIME: CPT

## 2019-01-26 PROCEDURE — 99239 PR HOSPITAL DISCHARGE DAY,>30 MIN: ICD-10-PCS | Mod: ,,, | Performed by: INTERNAL MEDICINE

## 2019-01-26 PROCEDURE — 80048 BASIC METABOLIC PNL TOTAL CA: CPT

## 2019-01-26 RX ORDER — DIGOXIN 125 MCG
0.12 TABLET ORAL DAILY
Qty: 30 TABLET | Refills: 11 | Status: SHIPPED | OUTPATIENT
Start: 2019-01-27 | End: 2020-01-27

## 2019-01-26 RX ORDER — WARFARIN 4 MG/1
4 TABLET ORAL DAILY
Qty: 30 TABLET | Refills: 2 | Status: SHIPPED | OUTPATIENT
Start: 2019-01-26 | End: 2019-04-25 | Stop reason: ALTCHOICE

## 2019-01-26 RX ADMIN — ATORVASTATIN CALCIUM 40 MG: 20 TABLET, FILM COATED ORAL at 08:01

## 2019-01-26 RX ADMIN — ASPIRIN 81 MG: 81 TABLET, COATED ORAL at 08:01

## 2019-01-26 RX ADMIN — DIGOXIN 0.12 MG: 125 TABLET ORAL at 08:01

## 2019-01-26 RX ADMIN — FUROSEMIDE 40 MG: 40 TABLET ORAL at 08:01

## 2019-01-26 NOTE — PROGRESS NOTES
[facility]  Interventional Cardiology  Progress Note    Patient Name: Tara Mondragon  MRN: [id number]  Admission Date: 1/22/2019  Hospital Length of Stay: 4 days  Code Status: Full Code   Attending Physician: Charan Faria MD   Primary Care Physician: Jaun Mata MD  Principal Problem:Non-rheumatic mitral regurgitation    Subjective:     Interval History: Had episodes of bradycardia along with PACs and occasional junctional escape on telemetry suggestive of sinus node dysfunction.        Objective:     Vital Signs (Most Recent):  Temp: 98.1 °F (36.7 °C) (01/26/19 0700)  Pulse: 98 (01/26/19 0745)  Resp: (!) 36 (01/26/19 0745)  BP: (!) 112/59 (01/26/19 0740)  SpO2: (!) 91 % (01/26/19 0745) Vital Signs (24h Range):  Temp:  [97.8 °F (36.6 °C)-98.7 °F (37.1 °C)] 98.1 °F (36.7 °C)  Pulse:  [] 98  Resp:  [21-38] 36  SpO2:  [89 %-97 %] 91 %  BP: ()/(52-68) 112/59     Weight: 77.4 kg (170 lb 10.2 oz)(stand)  Body mass index is 29.29 kg/m².    SpO2: (!) 91 %  O2 Device (Oxygen Therapy): room air      Intake/Output Summary (Last 24 hours) at 1/26/2019 0746  Last data filed at 1/26/2019 0700  Gross per 24 hour   Intake 720 ml   Output 1450 ml   Net -730 ml       Lines/Drains/Airways     Peripheral Intravenous Line                 Peripheral IV - Single Lumen 01/23/19 1058 Anterior;Right Forearm 2 days         Peripheral IV - Single Lumen 01/24/19 1300 Right Antecubital 1 day                Physical Exam   Constitutional: She is oriented to person, place, and time. She appears well-developed and well-nourished. No distress.   HENT:   Head: Normocephalic and atraumatic.   Mouth/Throat: Oropharynx is clear and moist.   Eyes: Conjunctivae and EOM are normal. Pupils are equal, round, and reactive to light. No scleral icterus.   Neck: Neck supple. JVD present. No tracheal deviation present.   Cardiovascular: Normal rate and regular rhythm. Exam reveals no gallop and no friction rub.   No murmur  heard.  Pulmonary/Chest: Effort normal. No respiratory distress. She has decreased breath sounds in the right lower field and the left lower field. She has no wheezes. She has no rales. She exhibits no tenderness.   Abdominal: Soft. Bowel sounds are normal. She exhibits no distension. There is no hepatosplenomegaly. There is no tenderness.   Musculoskeletal: She exhibits no edema or tenderness.   Neurological: She is alert and oriented to person, place, and time.   Skin: Skin is warm and dry. No rash noted. No erythema.   Psychiatric: She has a normal mood and affect. Her behavior is normal.       Significant Labs:   BMP:   Recent Labs   Lab 01/24/19  1352 01/25/19  0316 01/26/19  0327   * 106 96   * 134* 137   K 3.7  3.7 3.8 4.0   CL 97 101 101   CO2 27 24 25   BUN 19 17 14   CREATININE 1.2 0.9 0.8   CALCIUM 8.9 8.6* 8.5*   MG  --  1.7 1.7    and CBC   Recent Labs   Lab 01/25/19  0316 01/26/19  0327   WBC 8.71 6.79   HGB 7.8* 8.1*   HCT 25.3* 26.2*    147*         Assessment and Plan:     Patient is a 73 y.o. female presenting with:    Ready for discharge, will lower dose of coumadin on discharge as INR is 3.9.    Tachycardia    Sinus tachycardia.   Was on beta blockers and loaded with digoxin, however today had sinus bradycardia with junctional escape, suggestive of sick sinus syndrome, will hold beta blockers but will continue digoxin.     Hypokalemia    Replaced.      Hemolysis    Haptoglobin <10, Retic 8.1,  prior to TMVR.   Will re-evaluate at 1 month visit.      Postoperative anemia due to acute blood loss    Expected post-TAVR.   Improved today.   Asymptomatic.   No indication for transfusion at this time.      Cardiogenic shock    Patient presented in cardiogenic shock requiring Epi intra-op to complete procedure.   Now resolved post-TMVR.      Stage 3 chronic kidney disease      Stable.      CAD (coronary artery disease)    S/p CABG x 2 (LIMA to LAD and SVG to RCA)  Coronary  angiogram (11/28/18): Patent LIMA to LAD, occluded SVG to RCA, native RCA patent, LCx (separate ostia) patent with patent mid stent.  On ASA, statin, and off b-blocker due to bradycardia.      PAD (peripheral artery disease)    Occluded L mid SFA by US.   On Pletal.   Kong Class IIb sx.          VTE Risk Mitigation (From admission, onward)        Ordered     warfarin (COUMADIN) tablet 5 mg  Daily      01/22/19 1317          Ryann Cain MD  Interventional Cardiology  [facility]

## 2019-01-26 NOTE — ASSESSMENT & PLAN NOTE
S/p CABG x 2 (LIMA to LAD and SVG to RCA)  Coronary angiogram (11/28/18): Patent LIMA to LAD, occluded SVG to RCA, native RCA patent, LCx (separate ostia) patent with patent mid stent.  On ASA, statin, and off b-blocker due to bradycardia.

## 2019-01-26 NOTE — PLAN OF CARE
Problem: Adult Inpatient Plan of Care  Goal: Plan of Care Review  Outcome: Ongoing (interventions implemented as appropriate)   See vital signs and assessments in flowsheets. See below for updates on today's progress.     Pulmonary: Short of breath on mild-moderate exertion ; Desaturates to 85-86% on room air last night, therefore, place on 1L O2 via nasal cannula--SpO2 92%.    Cardiovascular: NSR ; but becomes Tachycardic easily on moderate exertion ; MAP > 65 mmHg ;     Neurological: AAOx4; PERRLA ; Afebrile    Gastrointestinal: No bowel movement overnight    Genitourinary: adequate urine output    Endocrine: no BG monitoring ordered    Integumentary/Other: Pale ; generally dry     Infusions: none    Patient progressing towards goals as tolerated, plan of care communicated and reviewed with Tara Mondragon and family. All concerns addressed. Will continue to monitor.

## 2019-01-26 NOTE — DISCHARGE SUMMARY
[facility]  Interventional Cardiology  Discharge Summary      Patient Name: Tara Mondragon  MRN: [id number]  Admission Date: 1/22/2019  Hospital Length of Stay: 4 days  Discharge Date and Time:  01/26/2019 8:59 AM  Attending Physician: Charan Faria MD  Discharging Provider: Ryann Cain MD  Primary Care Physician: Jaun Mata MD    HPI:  Ms. Mondragon is a very pleasant lady referred by Dr. Bailey and Dr. Duffy for evaluation of failing bioprosthetic mitral valve. She underwent MVR (27mm Mosaic MVR) + CABG (LIMA to LAD, SVG to RCA) by Dr. Jason in 2013. She did well until early November when she began to develop worsening HERNANDES. She was hospitalized at [facility] on 11/17 with decompensated heart failure. A LINDA at that time showed moderate MR. A coronary angiogram revealed patent LIMA to LAD and occluded SVG to RCA (native RCA patent) and severe MR with dilated LA. She was hospitalized again 11/23 for decompensated heart failure. She was subsequently referred back to Dr. Jason for consideration of redo MVR. He felt she was high risk and recommended she be evaluated for TMVR.      Currently, she becomes SOB ambulating more than 100 ft. She denies CP, PND, orthopnea, or LE edema.      She has undergone the following TMVR work-up:  ? S/p 27mm Mosaic MVR + CABG (Eren, 2013) - True ID = 22mm (OK for OS 23mm or US 26mm Alex S3)  ? LINDA (11/19/18): Moderate MR, EF= 65%  ? Coronary angiogram (11/28/18): Patent LIMA to LAD, occluded SVG to RCA, native RCA patent, LCx (separate ostia) patent with patent mid stent. Severe (4+) MR with LA dilation.  ? Frailty: 2/4  ? Rhythm issues: None  ? PFTs: Severe Restriction, FEV1= 57.7% predicted, FVC= 63.5% predicted, DLCO = 42% predicted.   ? MV per SRR: Area= 3.15 cm2, Avg Diam = 20 mm (21.1 x 19.7)  ? Incidental CT findings: pending official read  ? She is high risk per Dr. Jason due to debility and comorbidities.   ? Comorbidities; Stage III  "CKD, PAD (occluded L mid SFA)     OK for 23 or 26mm Alex S3 Valve-in-Valve TMVR via transseptal access.        Procedure(s) (LRB):  Valve study-mitral (N/A)  Replacement, Mitral Valve, Percutaneous, Transcatheter     Indwelling Lines/Drains at time of discharge:  Lines/Drains/Airways          None          Hospital Course:  Treated for cardiogenic shock, now off O2, on PO lasix, able to ambulate.        Significant Diagnostic Studies: Angiography:     Pending Diagnostic Studies:     Procedure Component Value Units Date/Time    X-Ray Chest 1 View [[id number]]     Order Status:  Sent Lab Status:  No result     X-Ray Chest 1 View [[id number]]     Order Status:  Sent Lab Status:  No result         No new Assessment & Plan notes have been filed under this hospital service since the last note was generated.  Service: Interventional Cardiology      Discharged Condition: good    Follow Up:  Follow-up Information     [facility] In 1 month.    Why:  1 month follow up with echo and labs (appointment will be mailed to you)               Patient Instructions:      WALKER FOR HOME USE   Order Comments: Per PT THELMA sosa recommended for home use rollator     Order Specific Question Answer Comments   Type of Walker: Rollator    With wheels? Yes    Height: 5' 4" (1.626 m)    Weight: 77.4 kg (170 lb 10.2 oz) stand   Length of need (1-99 months): 99    Does patient have medical equipment at home? shower chair    Please check all that apply: Patient's condition impairs ambulation.    Please check all that apply: Patient is unable to safely ambulate without equipment.    Vendor: [facility]    Expected Date of Delivery: 1/25/2019      Ambulatory consult to Anticoagulation Monitoring   Referral Priority: Routine Referral Type: Consultation   Referral Reason: Specialty Services Required   Requested Specialty: Cardiology   Number of Visits Requested: 1     Diet Cardiac     Activity as tolerated     Medications:  Reconciled Home " Medications:      Medication List      START taking these medications    digoxin 125 mcg tablet  Commonly known as:  LANOXIN  Take 1 tablet (0.125 mg total) by mouth once daily.  Start taking on:  1/27/2019     warfarin 4 MG tablet  Commonly known as:  COUMADIN  Take 1 tablet (4 mg total) by mouth Daily.        CONTINUE taking these medications    acetaminophen 325 MG tablet  Commonly known as:  TYLENOL  Take 325 mg by mouth once as needed for Pain.     albuterol 90 mcg/actuation inhaler  Commonly known as:  VENTOLIN HFA  Inhale 2 puffs into the lungs every 6 (six) hours as needed for Wheezing or Shortness of Breath. Rescue     ALPRAZolam 0.25 MG tablet  Commonly known as:  XANAX  TAKE 1 TABLET BY MOUTH EVERY EVENING AS NEEDED FOR ANXIETY     ammonium lactate 12 % Crea  Apply to feet twice daily. Avoid use between toes.     aspirin 81 MG EC tablet  Commonly known as:  ECOTRIN  Take 1 tablet by mouth Daily.     atorvastatin 40 MG tablet  Commonly known as:  LIPITOR  TAKE 1 TABLET(40 MG) BY MOUTH EVERY DAY     furosemide 40 MG tablet  Commonly known as:  LASIX  Take 1 tablet (40 mg total) by mouth 2 (two) times daily.     guaiFENesin 600 mg 12 hr tablet  Commonly known as:  MUCINEX  Take 1,200 mg by mouth once as needed for Congestion.     nitroGLYCERIN 0.4 MG SL tablet  Commonly known as:  NITROSTAT  Place 1 tablet (0.4 mg total) under the tongue every 5 (five) minutes as needed for Chest pain.     potassium chloride 10 MEQ Cpsr  Commonly known as:  MICRO-K  Take 2 capsules (20 mEq total) by mouth 2 (two) times daily.     spironolactone 25 MG tablet  Commonly known as:  ALDACTONE  Take 1 tablet (25 mg total) by mouth once daily.     triamcinolone acetonide 0.1% 0.1 % ointment  Commonly known as:  KENALOG  Apply topically 2 (two) times daily.        STOP taking these medications    cilostazol 50 MG Tab  Commonly known as:  PLETAL     metoprolol tartrate 50 MG tablet  Commonly known as:  LOPRESSOR            Time spent  on the discharge of patient: 20 minutes    Ryann Cain MD  Interventional Cardiology  [facility]

## 2019-01-26 NOTE — PROGRESS NOTES
Pt and spouse given discharge packet. This RN did read over the packet with them and went over all medications, including which were discontinued and new. Pt and spouse verbalized understanding. Explained each follow up appointment as well. All personal belongings sent with pt and spouse. Patient transported via wheelchair with this RN to meet spouse at their vehicle. Pt assisted into the car by myself and spouse. No complications noted during transport.

## 2019-01-26 NOTE — SUBJECTIVE & OBJECTIVE
Interval History: Had episodes of bradycardia along with PACs and occasional junctional escape on telemetry suggestive of sinus node dysfunction.        Objective:     Vital Signs (Most Recent):  Temp: 98.1 °F (36.7 °C) (01/26/19 0700)  Pulse: 98 (01/26/19 0745)  Resp: (!) 36 (01/26/19 0745)  BP: (!) 112/59 (01/26/19 0740)  SpO2: (!) 91 % (01/26/19 0745) Vital Signs (24h Range):  Temp:  [97.8 °F (36.6 °C)-98.7 °F (37.1 °C)] 98.1 °F (36.7 °C)  Pulse:  [] 98  Resp:  [21-38] 36  SpO2:  [89 %-97 %] 91 %  BP: ()/(52-68) 112/59     Weight: 77.4 kg (170 lb 10.2 oz)(stand)  Body mass index is 29.29 kg/m².    SpO2: (!) 91 %  O2 Device (Oxygen Therapy): room air      Intake/Output Summary (Last 24 hours) at 1/26/2019 0746  Last data filed at 1/26/2019 0700  Gross per 24 hour   Intake 720 ml   Output 1450 ml   Net -730 ml       Lines/Drains/Airways     Peripheral Intravenous Line                 Peripheral IV - Single Lumen 01/23/19 1058 Anterior;Right Forearm 2 days         Peripheral IV - Single Lumen 01/24/19 1300 Right Antecubital 1 day                Physical Exam   Constitutional: She is oriented to person, place, and time. She appears well-developed and well-nourished. No distress.   HENT:   Head: Normocephalic and atraumatic.   Mouth/Throat: Oropharynx is clear and moist.   Eyes: Conjunctivae and EOM are normal. Pupils are equal, round, and reactive to light. No scleral icterus.   Neck: Neck supple. JVD present. No tracheal deviation present.   Cardiovascular: Normal rate and regular rhythm. Exam reveals no gallop and no friction rub.   No murmur heard.  Pulmonary/Chest: Effort normal. No respiratory distress. She has decreased breath sounds in the right lower field and the left lower field. She has no wheezes. She has no rales. She exhibits no tenderness.   Abdominal: Soft. Bowel sounds are normal. She exhibits no distension. There is no hepatosplenomegaly. There is no tenderness.   Musculoskeletal: She  exhibits no edema or tenderness.   Neurological: She is alert and oriented to person, place, and time.   Skin: Skin is warm and dry. No rash noted. No erythema.   Psychiatric: She has a normal mood and affect. Her behavior is normal.       Significant Labs:   BMP:   Recent Labs   Lab 01/24/19  1352 01/25/19  0316 01/26/19  0327   * 106 96   * 134* 137   K 3.7  3.7 3.8 4.0   CL 97 101 101   CO2 27 24 25   BUN 19 17 14   CREATININE 1.2 0.9 0.8   CALCIUM 8.9 8.6* 8.5*   MG  --  1.7 1.7    and CBC   Recent Labs   Lab 01/25/19  0316 01/26/19  0327   WBC 8.71 6.79   HGB 7.8* 8.1*   HCT 25.3* 26.2*    147*

## 2019-01-28 ENCOUNTER — LAB VISIT (OUTPATIENT)
Dept: LAB | Facility: HOSPITAL | Age: 74
End: 2019-01-28
Attending: INTERNAL MEDICINE
Payer: COMMERCIAL

## 2019-01-28 ENCOUNTER — ANTI-COAG VISIT (OUTPATIENT)
Dept: CARDIOLOGY | Facility: CLINIC | Age: 74
End: 2019-01-28

## 2019-01-28 ENCOUNTER — DOCUMENTATION ONLY (OUTPATIENT)
Dept: CARDIOLOGY | Facility: CLINIC | Age: 74
End: 2019-01-28

## 2019-01-28 DIAGNOSIS — Z79.01 LONG TERM (CURRENT) USE OF ANTICOAGULANTS: ICD-10-CM

## 2019-01-28 DIAGNOSIS — I05.9 MITRAL VALVE DISEASE: ICD-10-CM

## 2019-01-28 DIAGNOSIS — Z79.01 LONG TERM (CURRENT) USE OF ANTICOAGULANTS: Primary | ICD-10-CM

## 2019-01-28 DIAGNOSIS — I34.0 NON-RHEUMATIC MITRAL REGURGITATION: ICD-10-CM

## 2019-01-28 DIAGNOSIS — Z95.2 S/P MVR (MITRAL VALVE REPLACEMENT): ICD-10-CM

## 2019-01-28 LAB
INR PPP: 8.4
PROTHROMBIN TIME: 85.6 SEC

## 2019-01-28 PROCEDURE — 36415 COLL VENOUS BLD VENIPUNCTURE: CPT

## 2019-01-28 PROCEDURE — 85610 PROTHROMBIN TIME: CPT

## 2019-01-28 NOTE — PROGRESS NOTES
73 year old female admitted 1/22/19 - 1/26/19 for non-rheumatic mitral regurgitation, she was given Coumadin 5 mg each day in the hospital 1/22/19 - 1/25/19 with her last INR on 1/26/19 high at 3.9 so she will have an INR via lab 1/28/19 with new patient education appointment on 2/1/19.  She was discharged with Coumadin 4 mg strength tablet.    Patient confirmed her Coumadin strength as 4 mg tablet which she started taking on 1/26/19.  Instructions given to take 1 tablet daily after 5 pm but will get INR today to see if she may need to hold dose which she verbalized understanding.      PMHX:    PAD (peripheral artery disease)  CAD (coronary artery disease)  Cardiomyopathy, secondary    Left ventricular diastolic dysfunction, NYHA class 3  Cardiomyopathy    S/P MVR (mitral valve replacement)     S/P CABG x 2    Disorder of arteries and arterioles   Valvular regurgitation       Overview    Mild/Mod MR post MI  Hypertension    Hyperlipidemia     Acute coronary syndrome     Diastolic dysfunction     Chronic diastolic heart failure     Congestive heart failure     Severe mitral regurgitation    Non-rheumatic mitral regurgitation     Mitral valve disease     ardiogenic shock     Acute on chronic diastolic heart failure   Tachycardia      Renal/  Stage 3 chronic kidney disease    Hypokalemia      Oncology    Postoperative anemia due to acute blood los  Hemolysis         Endocrine  Hyperglycemia    Hyperthyroidism        Obesity (BMI 30.0-34.9)    HERNANDES (dyspnea on exertion)     Shortness of breath

## 2019-01-28 NOTE — PROGRESS NOTES
Received call from [facility] regarding INR of 8.0 on patient.  Notified Coumadin clinic who will contact patient for instructions.

## 2019-01-28 NOTE — PROGRESS NOTES
INR increased very quickly. She is s/p biosprosthetic MVR in 2013. We managed her for a short time after that procedure. Her INR was sensitive at that time as well and dose was not stabilized prior to her stopping. Dose ranged from 15mg to 25mg per week. She is now with failing MVR and preparing for redo.

## 2019-01-28 NOTE — PROGRESS NOTES
Suzan kingsley/Dr. Faria called to report an INR was called in to them by [facility] as: INR -8, pulled result from Epic and result is 8.4

## 2019-01-29 ENCOUNTER — ANTI-COAG VISIT (OUTPATIENT)
Dept: CARDIOLOGY | Facility: CLINIC | Age: 74
End: 2019-01-29

## 2019-01-29 ENCOUNTER — LAB VISIT (OUTPATIENT)
Dept: LAB | Facility: HOSPITAL | Age: 74
End: 2019-01-29
Attending: INTERNAL MEDICINE
Payer: COMMERCIAL

## 2019-01-29 DIAGNOSIS — Z79.01 LONG TERM (CURRENT) USE OF ANTICOAGULANTS: ICD-10-CM

## 2019-01-29 DIAGNOSIS — I34.0 NON-RHEUMATIC MITRAL REGURGITATION: ICD-10-CM

## 2019-01-29 DIAGNOSIS — I05.9 MITRAL VALVE DISEASE: ICD-10-CM

## 2019-01-29 DIAGNOSIS — Z95.2 S/P MVR (MITRAL VALVE REPLACEMENT): ICD-10-CM

## 2019-01-29 LAB
INR PPP: 5
INR PPP: 5
PROTHROMBIN TIME: 51.1 SEC

## 2019-01-29 PROCEDURE — 85610 PROTHROMBIN TIME: CPT

## 2019-01-29 PROCEDURE — 36415 COLL VENOUS BLD VENIPUNCTURE: CPT

## 2019-01-29 NOTE — PROGRESS NOTES
INR was not ordered for today and would have only been necessary if patient was having any complications. Patient denies changes from yesterday. Coumadin is on hold. Repeat INR still needs to be drawn tomorrow.

## 2019-01-29 NOTE — PROGRESS NOTES
was advised to have Patient skip coumadin again tonight, and get lab drawn tomorrow 1/30/19, if no call is received regarding the result then take 4mg-1/30,  states understanding, appointment is already booked

## 2019-01-30 ENCOUNTER — ANTI-COAG VISIT (OUTPATIENT)
Dept: CARDIOLOGY | Facility: CLINIC | Age: 74
End: 2019-01-30

## 2019-01-30 ENCOUNTER — LAB VISIT (OUTPATIENT)
Dept: LAB | Facility: HOSPITAL | Age: 74
End: 2019-01-30
Attending: INTERNAL MEDICINE
Payer: COMMERCIAL

## 2019-01-30 DIAGNOSIS — Z79.01 LONG TERM (CURRENT) USE OF ANTICOAGULANTS: ICD-10-CM

## 2019-01-30 DIAGNOSIS — Z95.2 S/P MVR (MITRAL VALVE REPLACEMENT): ICD-10-CM

## 2019-01-30 DIAGNOSIS — I05.9 MITRAL VALVE DISEASE: ICD-10-CM

## 2019-01-30 DIAGNOSIS — I34.0 NON-RHEUMATIC MITRAL REGURGITATION: ICD-10-CM

## 2019-01-30 LAB
INR PPP: 3.9
PROTHROMBIN TIME: 40.2 SEC

## 2019-01-30 PROCEDURE — 36415 COLL VENOUS BLD VENIPUNCTURE: CPT

## 2019-01-30 PROCEDURE — 85610 PROTHROMBIN TIME: CPT

## 2019-02-01 ENCOUNTER — ANTI-COAG VISIT (OUTPATIENT)
Dept: CARDIOLOGY | Facility: CLINIC | Age: 74
End: 2019-02-01
Payer: COMMERCIAL

## 2019-02-01 DIAGNOSIS — I34.0 NON-RHEUMATIC MITRAL REGURGITATION: ICD-10-CM

## 2019-02-01 DIAGNOSIS — I05.9 MITRAL VALVE DISEASE: ICD-10-CM

## 2019-02-01 DIAGNOSIS — Z79.01 LONG TERM (CURRENT) USE OF ANTICOAGULANTS: Primary | ICD-10-CM

## 2019-02-01 DIAGNOSIS — Z95.2 S/P MVR (MITRAL VALVE REPLACEMENT): ICD-10-CM

## 2019-02-01 LAB — INR PPP: 3.7 (ref 2–3)

## 2019-02-01 PROCEDURE — 99211 OFF/OP EST MAY X REQ PHY/QHP: CPT | Mod: 25,S$GLB,,

## 2019-02-01 PROCEDURE — 85610 PROTHROMBIN TIME: CPT | Mod: QW,S$GLB,, | Performed by: INTERNAL MEDICINE

## 2019-02-01 PROCEDURE — 99211 PR OFFICE/OUTPT VISIT, EST, LEVL I: ICD-10-PCS | Mod: 25,S$GLB,,

## 2019-02-01 PROCEDURE — 85610 POCT INR: ICD-10-PCS | Mod: QW,S$GLB,, | Performed by: INTERNAL MEDICINE

## 2019-02-01 NOTE — PROGRESS NOTES
Patient here with her  who assists in medication and care. They were educated on coumadin diet restrictions, bleeding risks, potential for medication interactions, and when to call the coumadin clinic. Handouts given for reference. After reviewing diet restrictions, they have decided to avoid high vit K foods for now. She drinks an Ensure about every other day.  reports diarrhea and vomiting earlier in the week but has resolved. He also reports patient has not had control of bladder at night since home. Cautioned that this needs to be addressed since this was not an issue prior to hospital stay and could be a sign of UTI especially since she had a gary in the hospital. They will try to make an urgent care/internal med appt today.     They corrected me on hospital stay info. After reading d/c summary it appeared the patient was only evaluated for redo MVR during hospitalization. However, they report she had the redo MVR while admitted.    INR is still high after holding multiple doses. It is much better than INR on Monday. Will continue dose lowering and close follow up. No signs or symptoms of bleeding.

## 2019-02-02 ENCOUNTER — OFFICE VISIT (OUTPATIENT)
Dept: INTERNAL MEDICINE | Facility: CLINIC | Age: 74
End: 2019-02-02
Payer: COMMERCIAL

## 2019-02-02 ENCOUNTER — LAB VISIT (OUTPATIENT)
Dept: LAB | Facility: HOSPITAL | Age: 74
End: 2019-02-02
Attending: FAMILY MEDICINE
Payer: COMMERCIAL

## 2019-02-02 VITALS
DIASTOLIC BLOOD PRESSURE: 58 MMHG | BODY MASS INDEX: 27.81 KG/M2 | SYSTOLIC BLOOD PRESSURE: 112 MMHG | HEART RATE: 100 BPM | HEIGHT: 65 IN | WEIGHT: 166.88 LBS

## 2019-02-02 DIAGNOSIS — R41.0 ACUTE DELIRIUM: ICD-10-CM

## 2019-02-02 DIAGNOSIS — N39.0 URINARY TRACT INFECTION WITHOUT HEMATURIA, SITE UNSPECIFIED: Primary | ICD-10-CM

## 2019-02-02 DIAGNOSIS — N39.0 URINARY TRACT INFECTION WITHOUT HEMATURIA, SITE UNSPECIFIED: ICD-10-CM

## 2019-02-02 DIAGNOSIS — D64.9 ANEMIA, UNSPECIFIED TYPE: ICD-10-CM

## 2019-02-02 DIAGNOSIS — R82.79 URINE CULTURE POSITIVE: ICD-10-CM

## 2019-02-02 LAB
BACTERIA #/AREA URNS AUTO: ABNORMAL /HPF
BILIRUB UR QL STRIP: NEGATIVE
CLARITY UR REFRACT.AUTO: ABNORMAL
COLOR UR AUTO: ABNORMAL
GLUCOSE UR QL STRIP: NEGATIVE
HGB UR QL STRIP: ABNORMAL
KETONES UR QL STRIP: NEGATIVE
LEUKOCYTE ESTERASE UR QL STRIP: ABNORMAL
MICROSCOPIC COMMENT: ABNORMAL
NITRITE UR QL STRIP: NEGATIVE
PH UR STRIP: 6 [PH] (ref 5–8)
PROT UR QL STRIP: NEGATIVE
RBC #/AREA URNS AUTO: 6 /HPF (ref 0–4)
SP GR UR STRIP: 1.02 (ref 1–1.03)
SQUAMOUS #/AREA URNS AUTO: 8 /HPF
URN SPEC COLLECT METH UR: ABNORMAL
WBC #/AREA URNS AUTO: 93 /HPF (ref 0–5)
WBC CLUMPS UR QL AUTO: ABNORMAL

## 2019-02-02 PROCEDURE — 99215 PR OFFICE/OUTPT VISIT, EST, LEVL V, 40-54 MIN: ICD-10-PCS | Mod: S$GLB,,, | Performed by: FAMILY MEDICINE

## 2019-02-02 PROCEDURE — 3078F DIAST BP <80 MM HG: CPT | Mod: CPTII,S$GLB,, | Performed by: FAMILY MEDICINE

## 2019-02-02 PROCEDURE — 81001 URINALYSIS AUTO W/SCOPE: CPT

## 2019-02-02 PROCEDURE — 99215 OFFICE O/P EST HI 40 MIN: CPT | Mod: S$GLB,,, | Performed by: FAMILY MEDICINE

## 2019-02-02 PROCEDURE — 3074F PR MOST RECENT SYSTOLIC BLOOD PRESSURE < 130 MM HG: ICD-10-PCS | Mod: CPTII,S$GLB,, | Performed by: FAMILY MEDICINE

## 2019-02-02 PROCEDURE — 99999 PR PBB SHADOW E&M-EST. PATIENT-LVL III: CPT | Mod: PBBFAC,,, | Performed by: FAMILY MEDICINE

## 2019-02-02 PROCEDURE — 1101F PR PT FALLS ASSESS DOC 0-1 FALLS W/OUT INJ PAST YR: ICD-10-PCS | Mod: CPTII,S$GLB,, | Performed by: FAMILY MEDICINE

## 2019-02-02 PROCEDURE — 3078F PR MOST RECENT DIASTOLIC BLOOD PRESSURE < 80 MM HG: ICD-10-PCS | Mod: CPTII,S$GLB,, | Performed by: FAMILY MEDICINE

## 2019-02-02 PROCEDURE — 99999 PR PBB SHADOW E&M-EST. PATIENT-LVL III: ICD-10-PCS | Mod: PBBFAC,,, | Performed by: FAMILY MEDICINE

## 2019-02-02 PROCEDURE — 1101F PT FALLS ASSESS-DOCD LE1/YR: CPT | Mod: CPTII,S$GLB,, | Performed by: FAMILY MEDICINE

## 2019-02-02 PROCEDURE — 87086 URINE CULTURE/COLONY COUNT: CPT

## 2019-02-02 PROCEDURE — 87088 URINE BACTERIA CULTURE: CPT

## 2019-02-02 PROCEDURE — 87077 CULTURE AEROBIC IDENTIFY: CPT

## 2019-02-02 PROCEDURE — 3074F SYST BP LT 130 MM HG: CPT | Mod: CPTII,S$GLB,, | Performed by: FAMILY MEDICINE

## 2019-02-02 PROCEDURE — 87186 SC STD MICRODIL/AGAR DIL: CPT

## 2019-02-02 RX ORDER — AMOXICILLIN AND CLAVULANATE POTASSIUM 875; 125 MG/1; MG/1
1 TABLET, FILM COATED ORAL 2 TIMES DAILY
Qty: 14 TABLET | Refills: 0 | Status: SHIPPED | OUTPATIENT
Start: 2019-02-02 | End: 2019-02-06

## 2019-02-04 ENCOUNTER — LAB VISIT (OUTPATIENT)
Dept: LAB | Facility: HOSPITAL | Age: 74
End: 2019-02-04
Attending: INTERNAL MEDICINE
Payer: COMMERCIAL

## 2019-02-04 ENCOUNTER — ANTI-COAG VISIT (OUTPATIENT)
Dept: CARDIOLOGY | Facility: CLINIC | Age: 74
End: 2019-02-04

## 2019-02-04 DIAGNOSIS — Z79.01 LONG TERM (CURRENT) USE OF ANTICOAGULANTS: ICD-10-CM

## 2019-02-04 DIAGNOSIS — I05.9 MITRAL VALVE DISEASE: ICD-10-CM

## 2019-02-04 DIAGNOSIS — I34.0 NON-RHEUMATIC MITRAL REGURGITATION: ICD-10-CM

## 2019-02-04 DIAGNOSIS — Z95.2 S/P MVR (MITRAL VALVE REPLACEMENT): ICD-10-CM

## 2019-02-04 LAB
INR PPP: 2.7
PROTHROMBIN TIME: 27.9 SEC

## 2019-02-04 PROCEDURE — 85610 PROTHROMBIN TIME: CPT

## 2019-02-04 PROCEDURE — 36415 COLL VENOUS BLD VENIPUNCTURE: CPT

## 2019-02-04 NOTE — OP NOTE
DATE OF PROCEDURE: 01/22/2019    ATTENDING SURGEONS: Charan Faria M.D and Damien Jason M.D.    PREOPERATIVE DIAGNOSES:  1. Severe prosthetic mitral regurgitation.    POSTOPERATIVE DIAGNOSES:  1. Severe prosthetic mitral regurgitation      OPERATION PERFORMED: Transcatheter transseptal mitral valve insertion via  transfemoral   approach using a 26 mm [employer] Alex S3 valve    ANESTHESIA: General.    ESTIMATED BLOOD LOSS: 10 mL.    BRIEF HISTORY: This patient is a 73-year-old woman with severe mitral regurgitation.  The patient has had progressive dyspnea on exertion. This prompted a thoughtful and thorough evaluation, which demonstrated prosthetic mitral valve failure. Given the comorbid conditions, a transcatheter valve insertion was recommended. The patient now presents for transcatheter mitral valve insertion.    PROCEDURE: After obtaining informed and written consent, the patient was   brought to the cath lab and placed on the cath table in supine position. After   induction of adequate anesthesia, a transvenous pacing wire was placed.   Bilateral femoral arterial and femoral venous access was obtained.  A wire was advanced to the right atrium. The septum was crossed inferiorly.  It was exchanged for stiff wire, and  a guide catheter was inserted. A 26 mm S3 valve was advanced across the septum and into the mitral prosthesis. Once the team was satisfied that the valve was in proper position, it was deployed. Excellent positioning was obtained. Post-procedure echo demonstrated no mitral insufficiency. There was no significant LVOT obstruction. The femoral access sites were controlled using percutaneous techniques. The patient tolerated the procedure well, there were no complications. At the conclusion of the case, sponge and instrument counts were correct.

## 2019-02-05 ENCOUNTER — PATIENT MESSAGE (OUTPATIENT)
Dept: INTERNAL MEDICINE | Facility: CLINIC | Age: 74
End: 2019-02-05

## 2019-02-05 LAB — BACTERIA UR CULT: NORMAL

## 2019-02-05 RX ORDER — SULFAMETHOXAZOLE AND TRIMETHOPRIM 800; 160 MG/1; MG/1
1 TABLET ORAL 2 TIMES DAILY
Qty: 20 TABLET | Refills: 0 | Status: SHIPPED | OUTPATIENT
Start: 2019-02-05 | End: 2019-02-25

## 2019-02-05 NOTE — TELEPHONE ENCOUNTER
Spoke with pt's  re: urine culture.   Please stop Augmentin & start Bactrim DS BID X 10 days.   If pt's confusion worsens , please go to the ER.      verbalized understanding

## 2019-02-05 NOTE — TELEPHONE ENCOUNTER
----- Message from Brian Ivy MD sent at 2/5/2019  1:45 PM CST -----  Please call pt. Thank you!!! :)  The organism grown in the urine culture is resistant to the antibiotic I prescribed Saturday. I need to send another antibiotic. Will do now. Stop the Augmentin (amoxicillin/clavulanate) and get the other antibiotic going ASAP. Please let me know when family gets message. I will release this to [facility] but pt has some dementia or AMS slightly so please CALL PT's  per her verbal auth to me. If she is very ill or more confused, then they should go to ER immediately. Confusion on Sat was very mild and was merely more forgetfulness than usual. Thank you. I'll message pharmacy, too  ----- Message -----  From: Brian Ivy MD  Sent: 2/5/2019  10:59 AM  To: [facility] Staff    The organism grown in the urine culture is resistant to the antibiotic I prescribed Saturday. I need to send another antibiotic. Will do now. Stop the Augmentin (amoxicillin/clavulanate) and get the other antibiotic going ASAP. Please let me know when family gets message. I will release this to [facility] but pt has some dementia or AMS slightly so please CALL PT's  per her verbal auth to me. If she is very ill or more confused, then they should go to ER immediately. Confusion on Sat was very mild and was merely more forgetfulness than usual. Thank you. I'll message pharmacy, too.

## 2019-02-06 ENCOUNTER — ANTI-COAG VISIT (OUTPATIENT)
Dept: CARDIOLOGY | Facility: CLINIC | Age: 74
End: 2019-02-06

## 2019-02-06 ENCOUNTER — LAB VISIT (OUTPATIENT)
Dept: LAB | Facility: HOSPITAL | Age: 74
End: 2019-02-06
Attending: INTERNAL MEDICINE
Payer: COMMERCIAL

## 2019-02-06 ENCOUNTER — OFFICE VISIT (OUTPATIENT)
Dept: CARDIOLOGY | Facility: CLINIC | Age: 74
End: 2019-02-06
Payer: COMMERCIAL

## 2019-02-06 ENCOUNTER — TELEPHONE (OUTPATIENT)
Dept: FAMILY MEDICINE | Facility: CLINIC | Age: 74
End: 2019-02-06

## 2019-02-06 VITALS
HEART RATE: 102 BPM | OXYGEN SATURATION: 95 % | SYSTOLIC BLOOD PRESSURE: 98 MMHG | DIASTOLIC BLOOD PRESSURE: 63 MMHG | WEIGHT: 167.31 LBS | BODY MASS INDEX: 27.88 KG/M2 | HEIGHT: 65 IN

## 2019-02-06 DIAGNOSIS — R06.09 DOE (DYSPNEA ON EXERTION): ICD-10-CM

## 2019-02-06 DIAGNOSIS — Z79.01 LONG TERM (CURRENT) USE OF ANTICOAGULANTS: ICD-10-CM

## 2019-02-06 DIAGNOSIS — I25.10 CORONARY ARTERY DISEASE INVOLVING NATIVE CORONARY ARTERY OF NATIVE HEART WITHOUT ANGINA PECTORIS: Primary | ICD-10-CM

## 2019-02-06 DIAGNOSIS — Z95.2 S/P MVR (MITRAL VALVE REPLACEMENT): ICD-10-CM

## 2019-02-06 DIAGNOSIS — I05.9 MITRAL VALVE DISEASE: ICD-10-CM

## 2019-02-06 DIAGNOSIS — I10 ESSENTIAL HYPERTENSION: ICD-10-CM

## 2019-02-06 DIAGNOSIS — I34.0 NON-RHEUMATIC MITRAL REGURGITATION: ICD-10-CM

## 2019-02-06 DIAGNOSIS — Z95.1 S/P CABG X 2: ICD-10-CM

## 2019-02-06 LAB
INR PPP: 1.6
PROTHROMBIN TIME: 16.9 SEC

## 2019-02-06 PROCEDURE — 85610 PROTHROMBIN TIME: CPT

## 2019-02-06 PROCEDURE — 99214 OFFICE O/P EST MOD 30 MIN: CPT | Mod: S$GLB,,, | Performed by: STUDENT IN AN ORGANIZED HEALTH CARE EDUCATION/TRAINING PROGRAM

## 2019-02-06 PROCEDURE — 99214 PR OFFICE/OUTPT VISIT, EST, LEVL IV, 30-39 MIN: ICD-10-PCS | Mod: S$GLB,,, | Performed by: STUDENT IN AN ORGANIZED HEALTH CARE EDUCATION/TRAINING PROGRAM

## 2019-02-06 PROCEDURE — 36415 COLL VENOUS BLD VENIPUNCTURE: CPT

## 2019-02-06 PROCEDURE — 1101F PT FALLS ASSESS-DOCD LE1/YR: CPT | Mod: CPTII,S$GLB,, | Performed by: STUDENT IN AN ORGANIZED HEALTH CARE EDUCATION/TRAINING PROGRAM

## 2019-02-06 PROCEDURE — 3074F SYST BP LT 130 MM HG: CPT | Mod: CPTII,S$GLB,, | Performed by: STUDENT IN AN ORGANIZED HEALTH CARE EDUCATION/TRAINING PROGRAM

## 2019-02-06 PROCEDURE — 3074F PR MOST RECENT SYSTOLIC BLOOD PRESSURE < 130 MM HG: ICD-10-PCS | Mod: CPTII,S$GLB,, | Performed by: STUDENT IN AN ORGANIZED HEALTH CARE EDUCATION/TRAINING PROGRAM

## 2019-02-06 PROCEDURE — 99999 PR PBB SHADOW E&M-EST. PATIENT-LVL III: CPT | Mod: PBBFAC,,, | Performed by: STUDENT IN AN ORGANIZED HEALTH CARE EDUCATION/TRAINING PROGRAM

## 2019-02-06 PROCEDURE — 1101F PR PT FALLS ASSESS DOC 0-1 FALLS W/OUT INJ PAST YR: ICD-10-PCS | Mod: CPTII,S$GLB,, | Performed by: STUDENT IN AN ORGANIZED HEALTH CARE EDUCATION/TRAINING PROGRAM

## 2019-02-06 PROCEDURE — 3078F PR MOST RECENT DIASTOLIC BLOOD PRESSURE < 80 MM HG: ICD-10-PCS | Mod: CPTII,S$GLB,, | Performed by: STUDENT IN AN ORGANIZED HEALTH CARE EDUCATION/TRAINING PROGRAM

## 2019-02-06 PROCEDURE — 3078F DIAST BP <80 MM HG: CPT | Mod: CPTII,S$GLB,, | Performed by: STUDENT IN AN ORGANIZED HEALTH CARE EDUCATION/TRAINING PROGRAM

## 2019-02-06 PROCEDURE — 99999 PR PBB SHADOW E&M-EST. PATIENT-LVL III: ICD-10-PCS | Mod: PBBFAC,,, | Performed by: STUDENT IN AN ORGANIZED HEALTH CARE EDUCATION/TRAINING PROGRAM

## 2019-02-06 NOTE — TELEPHONE ENCOUNTER
----- Message from Augustina Leigh sent at 2/6/2019  1:30 PM CST -----  Contact: Priyank Guy from [facility] [phone]  Calling to talk to nurse in regards to patients physical therapy.

## 2019-02-06 NOTE — TELEPHONE ENCOUNTER
----- Message from Augustina Leigh sent at 2/6/2019  1:30 PM CST -----  Contact: Priyank Guy from [facility] [phone]  Calling to talk to nurse in regards to patients physical therapy.

## 2019-02-06 NOTE — PROGRESS NOTES
"   Cardiology Clinic note    Subjective:   Patient ID:  Tara Mondragon is a 73 y.o. female who presents for follow-up of severe mitral regurgitation    HPI:   Tara Mondragon  has a past medical history of Acute coronary syndrome, Cardiomyopathy, Coronary artery disease, Hypertension, Hyperthyroidism, Osteoporosis, PAD (peripheral artery disease), and Valvular regurgitation.  Here for follow up of symptomatic severe mitral regurgitation. Previous seen by Dr. Bailey and Dr. Duffy. Now s/p TMVR  by Dr. Faria for evaluation of failing bioprosthetic mitral valve.  See Dr. Faria clinic note.    2/6/19: here for TMVR follow up. Patient is doing well. Had 4 day post-op ICU course with her procedure. She notes she is doing much better. Not needing to use home O2. Feels like her energy is also improving. Not needing to use a walker as much. Less dyspnea on exertion.    Vitals  Vitals:    02/06/19 1359   BP: 98/63   Pulse: 102   SpO2: 95%   Weight: 75.9 kg (167 lb 4.8 oz)   Height: 5' 4.5" (1.638 m)       Patient Active Problem List    Diagnosis Date Noted    Long term (current) use of anticoagulants [Z79.01] 01/28/2019    Tachycardia 01/24/2019    Cardiogenic shock 01/23/2019    Acute on chronic diastolic heart failure 01/23/2019    Postoperative anemia due to acute blood loss 01/23/2019    Hemolysis 01/23/2019    Hypokalemia 01/23/2019    Non-rheumatic mitral regurgitation 01/22/2019    Mitral valve disease 01/22/2019    Stage 3 chronic kidney disease 01/21/2019    Severe mitral regurgitation 12/04/2018    Congestive heart failure 11/26/2018    Shortness of breath 11/23/2018    Chronic diastolic heart failure 11/23/2018    HERNANDES (dyspnea on exertion) 11/16/2018    Obesity (BMI 30.0-34.9) 05/25/2017    Diastolic dysfunction 01/17/2017    Acute coronary syndrome     Hyperlipidemia 07/30/2013    Hyperthyroidism 07/30/2013    Valvular regurgitation      Mild/Mod MR post MI      Hypertension     Disorder of " arteries and arterioles 03/05/2013    Hyperglycemia 02/27/2013    S/P MVR (mitral valve replacement) 02/27/2013    S/P CABG x 2 02/27/2013    Cardiomyopathy     CAD (coronary artery disease) 02/16/2013    Cardiomyopathy, secondary 02/16/2013    Left ventricular diastolic dysfunction, NYHA class 3 02/16/2013    PAD (peripheral artery disease) 07/10/2012       Patient's Medications   New Prescriptions    No medications on file   Previous Medications    ACETAMINOPHEN (TYLENOL) 325 MG TABLET    Take 325 mg by mouth once as needed for Pain.    ALBUTEROL (VENTOLIN HFA) 90 MCG/ACTUATION INHALER    Inhale 2 puffs into the lungs every 6 (six) hours as needed for Wheezing or Shortness of Breath. Rescue    ALPRAZOLAM (XANAX) 0.25 MG TABLET    TAKE 1 TABLET BY MOUTH EVERY EVENING AS NEEDED FOR ANXIETY    AMMONIUM LACTATE 12 % CREA    Apply to feet twice daily. Avoid use between toes.    ASPIRIN (ECOTRIN) 81 MG EC TABLET    Take 1 tablet by mouth Daily.    ATORVASTATIN (LIPITOR) 40 MG TABLET    TAKE 1 TABLET(40 MG) BY MOUTH EVERY DAY    DIGOXIN (LANOXIN) 125 MCG TABLET    Take 1 tablet (0.125 mg total) by mouth once daily.    FUROSEMIDE (LASIX) 40 MG TABLET    Take 1 tablet (40 mg total) by mouth 2 (two) times daily.    GUAIFENESIN (MUCINEX) 600 MG 12 HR TABLET    Take 1,200 mg by mouth once as needed for Congestion.    NITROGLYCERIN (NITROSTAT) 0.4 MG SL TABLET    Place 1 tablet (0.4 mg total) under the tongue every 5 (five) minutes as needed for Chest pain.    POTASSIUM CHLORIDE (MICRO-K) 10 MEQ CPSR    Take 2 capsules (20 mEq total) by mouth 2 (two) times daily.    SPIRONOLACTONE (ALDACTONE) 25 MG TABLET    Take 1 tablet (25 mg total) by mouth once daily.    SULFAMETHOXAZOLE-TRIMETHOPRIM 800-160MG (BACTRIM DS) 800-160 MG TAB    Take 1 tablet by mouth 2 (two) times daily. Stop amoxicillin/clavulanate    TRIAMCINOLONE ACETONIDE 0.1% (KENALOG) 0.1 % OINTMENT    Apply topically 2 (two) times daily.    WARFARIN (COUMADIN)  4 MG TABLET    Take 1 tablet (4 mg total) by mouth Daily.   Modified Medications    No medications on file   Discontinued Medications    AMOXICILLIN-CLAVULANATE 875-125MG (AUGMENTIN) 875-125 MG PER TABLET    Take 1 tablet by mouth 2 (two) times daily. X 7 days         Review of Systems   Constitution: Negative for chills, decreased appetite, weakness, malaise/fatigue and weight gain.   HENT: Negative for congestion and ear discharge.    Eyes: Negative for blurred vision and double vision.   Cardiovascular: Positive for dyspnea on exertion. Negative for chest pain, cyanosis, irregular heartbeat, leg swelling, near-syncope, orthopnea, palpitations and syncope.   Respiratory: Negative for cough, shortness of breath and sleep disturbances due to breathing.    Skin: Negative for color change and dry skin.   Musculoskeletal: Negative for joint pain, joint swelling and muscle cramps.   Gastrointestinal: Negative for bloating, heartburn, hematemesis and hematochezia.   Genitourinary: Negative for bladder incontinence and dysuria.   Neurological: Negative for aphonia, excessive daytime sleepiness, dizziness, focal weakness, headaches, light-headedness and loss of balance.   Psychiatric/Behavioral: Negative for altered mental status, depression and memory loss. The patient does not have insomnia and is not nervous/anxious.          Objective:   Physical Exam   Constitutional: She is oriented to person, place, and time. She appears well-developed and well-nourished.   HENT:   Head: Normocephalic and atraumatic.   Eyes: Conjunctivae and EOM are normal.   Neck: Normal range of motion. Neck supple. No JVD present.   Cardiovascular: Normal rate and regular rhythm.   Murmur heard.  Pulmonary/Chest: Effort normal and breath sounds normal. No respiratory distress. She has no wheezes. She has no rales.   Abdominal: Soft. Bowel sounds are normal. She exhibits no distension.   Musculoskeletal: Normal range of motion. She exhibits no  edema.   Neurological: She is alert and oriented to person, place, and time.   Skin: Skin is warm and dry. No erythema.   Psychiatric: She has a normal mood and affect. Her behavior is normal. Judgment and thought content normal.   Nursing note and vitals reviewed.      Lab Results    Lab Results   Component Value Date     01/26/2019    K 4.0 01/26/2019     01/26/2019    CO2 25 01/26/2019    BUN 14 01/26/2019    CREATININE 0.8 01/26/2019    GLU 96 01/26/2019    HGBA1C 5.5 09/30/2014    MG 1.7 01/26/2019    AST 20 11/23/2018    ALT 22 11/23/2018    ALBUMIN 3.0 (L) 01/21/2019    PROT 6.8 11/23/2018    BILITOT 1.3 (H) 11/23/2018    WBC 6.79 01/26/2019    HGB 8.1 (L) 01/26/2019    HCT 26.2 (L) 01/26/2019    HCT 24 (L) 01/22/2019    MCV 97 01/26/2019     (L) 01/26/2019    INR 1.6 (H) 02/06/2019    INR 5.0 01/29/2019    TSH 0.878 04/12/2018    CHOL 106 (L) 05/27/2017    HDL 46 05/27/2017    LDLCALC 47.8 (L) 05/27/2017    TRIG 61 05/27/2017     (H) 11/25/2018       Lipid panel  Lab Results   Component Value Date    CHOL 106 (L) 05/27/2017     Lab Results   Component Value Date    HDL 46 05/27/2017     Lab Results   Component Value Date    LDLCALC 47.8 (L) 05/27/2017     Lab Results   Component Value Date    TRIG 61 05/27/2017       Cardiac Studies    ECG: Normal sinus rhythm  anteroseptal T wave changes, consider ischemia     · Cath study : LVEDP 18  · EF 65%  · Severe (4+) mitral regurgitation with severe LA dilation.  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  · Patent LIMA-LAD, occluded SVG-RCA  · Native RCA patent with mild to moderate disease  · Separate ostia of LAD and LCX and LCX native circulation patent with patent mid stent  RHC pressures: RA 8/7 (4), RV 61/4, RPAP 60/18, PWP 21/48 (26), SUZIE CO 4.73, SUZIE CI 2.43      1/22/19  S/p transcutaneous mitral valve replacement;  [facility] 26mm Mitral valve. Model 9600TFX: - Serial [id number]    1/24/19  · Low normal left ventricular  systolic function. The estimated ejection fraction is 40-45%.  · Local segmental wall motion abnormalities.  · There is a Mosaic 27mm bioprosthetic mitral valve s/p 26mmg Alex S3 Ricardo TMVR. Mean gradient of 8mmHg at heart rate of 90bpm. There is no regurgitation seen; however, there is abnormal tissue motion within as well as on the atrial side of the mitral valve (image [id number]). Recommend a LINDA to further evaluate.  · Indeterminate left ventricular diastolic function.  · Septal wall has abnormal motion consistent with post-operative status.  · Normal right ventricular systolic function.  · Mild tricuspid regurgitation.  · The estimated PA systolic pressure is 44 mm Hg  · Pulmonary hypertension present.  · Normal central venous pressure (3 mm Hg).  · Moderate left atrial enlargement.  Assessment:     1. Coronary artery disease involving native coronary artery of native heart without angina pectoris    2. Mitral valve disease    3. Essential hypertension    4. HERNANDES (dyspnea on exertion)    5. S/P CABG x 2    6. S/P MVR (mitral valve replacement)        Plan:     1. Severe symptomatic mitral valve regurgitation  S/p MAVR on 1/22 with Dr. Faria  - looks to euvolemic. Consulted on trying to maintain stable weights  - still notes mild HERNANDES, this is improved from last visit  - resumed INR, at goal at last check, 2.5-3.5    2.HTN  - well controlled  - will c/w aldactone, lasix,     3. HLD  Statin    4. CAD: s/p 2v CABG  Stable    5. Heart failure 2nd to VHD  - s/p MVR  - will c/w lasix. Aldactone  - will try to resume BB/lisinopril and cut down lasix after her next MVR post-op visit    Continue with current medical plan and lifestyle changes.  Return sooner for concerns or questions. If symptoms persist go to the ED    No orders of the defined types were placed in this encounter.    Follow up as scheduled. Return to clinic in 3 months for heart failure, HTN mx  She expressed verbal understanding and agreed with the  plan    Thank you for the opportunity to care for this patient. Will be available for questions if needed.     William Quiroz MD [facility]  Interventional Cardiology  [facility]  Pager: [phone]

## 2019-02-07 NOTE — PROGRESS NOTES
"Subjective:   Patient ID: Tara Mondragon is a 73 y.o. female.    Chief Complaint: Diarrhea      HPI  74 yo with dysuria--not diarrhea-- for days after undergoing recent "wick" in vulva instead of Reynolds catheter. Having some episodic confusion per  and daughter (daughter is from [place]) who state this is worse but some has been ongoing.    Patient queried and denies any further complaints.      ALLERGIES AND MEDICATIONS: updated and reviewed.  Review of patient's allergies indicates:   Allergen Reactions    Plavix [clopidogrel] Rash       Current Outpatient Medications:     acetaminophen (TYLENOL) 325 MG tablet, Take 325 mg by mouth once as needed for Pain., Disp: , Rfl:     albuterol (VENTOLIN HFA) 90 mcg/actuation inhaler, Inhale 2 puffs into the lungs every 6 (six) hours as needed for Wheezing or Shortness of Breath. Rescue, Disp: 6.7 g, Rfl: 2    ALPRAZolam (XANAX) 0.25 MG tablet, TAKE 1 TABLET BY MOUTH EVERY EVENING AS NEEDED FOR ANXIETY, Disp: 30 tablet, Rfl: 0    ammonium lactate 12 % Crea, Apply to feet twice daily. Avoid use between toes., Disp: 140 g, Rfl: 5    aspirin (ECOTRIN) 81 MG EC tablet, Take 1 tablet by mouth Daily., Disp: , Rfl:     atorvastatin (LIPITOR) 40 MG tablet, TAKE 1 TABLET(40 MG) BY MOUTH EVERY DAY, Disp: 90 tablet, Rfl: 4    digoxin (LANOXIN) 125 mcg tablet, Take 1 tablet (0.125 mg total) by mouth once daily., Disp: 30 tablet, Rfl: 11    furosemide (LASIX) 40 MG tablet, Take 1 tablet (40 mg total) by mouth 2 (two) times daily., Disp: 60 tablet, Rfl: 11    guaifenesin (MUCINEX) 600 mg 12 hr tablet, Take 1,200 mg by mouth once as needed for Congestion., Disp: , Rfl:     nitroGLYCERIN (NITROSTAT) 0.4 MG SL tablet, Place 1 tablet (0.4 mg total) under the tongue every 5 (five) minutes as needed for Chest pain., Disp: 100 tablet, Rfl: 0    potassium chloride (MICRO-K) 10 MEQ CpSR, Take 2 capsules (20 mEq total) by mouth 2 (two) times daily., Disp: 60 capsule, Rfl: 11    " spironolactone (ALDACTONE) 25 MG tablet, Take 1 tablet (25 mg total) by mouth once daily., Disp: 30 tablet, Rfl: 6    triamcinolone acetonide 0.1% (KENALOG) 0.1 % ointment, Apply topically 2 (two) times daily., Disp: 30 g, Rfl: 2    warfarin (COUMADIN) 4 MG tablet, Take 1 tablet (4 mg total) by mouth Daily., Disp: 30 tablet, Rfl: 2    sulfamethoxazole-trimethoprim 800-160mg (BACTRIM DS) 800-160 mg Tab, Take 1 tablet by mouth 2 (two) times daily. Stop amoxicillin/clavulanate, Disp: 20 tablet, Rfl: 0    Review of Systems   Constitutional: Negative for activity change, appetite change, chills, diaphoresis, fatigue, fever and unexpected weight change.   HENT: Negative for congestion, ear discharge, ear pain, facial swelling, hearing loss, nosebleeds, postnasal drip, rhinorrhea, sinus pressure, sneezing, sore throat, tinnitus, trouble swallowing and voice change.    Eyes: Negative for photophobia, pain, discharge, redness, itching and visual disturbance.   Respiratory: Negative for cough, chest tightness, shortness of breath and wheezing.    Cardiovascular: Negative for chest pain, palpitations and leg swelling.   Gastrointestinal: Negative for abdominal distention, abdominal pain, anal bleeding, blood in stool, constipation, diarrhea, nausea, rectal pain and vomiting.   Endocrine: Negative for cold intolerance, heat intolerance, polydipsia, polyphagia and polyuria.   Genitourinary: Positive for dysuria, frequency and urgency. Negative for decreased urine volume, difficulty urinating, enuresis, flank pain, genital sores, hematuria, menstrual problem, pelvic pain, vaginal bleeding, vaginal discharge and vaginal pain.   Musculoskeletal: Negative for arthralgias, back pain, joint swelling, myalgias and neck pain.   Skin: Negative for rash.   Neurological: Negative for dizziness, tremors, seizures, syncope, speech difficulty, weakness, light-headedness, numbness and headaches.   Psychiatric/Behavioral: Positive for  "confusion and decreased concentration. Negative for behavioral problems, dysphoric mood, sleep disturbance and suicidal ideas. The patient is nervous/anxious. The patient is not hyperactive.        Objective:     Vitals:    02/02/19 0814   BP: (!) 112/58   Pulse: 100   Weight: 75.7 kg (166 lb 14.2 oz)   Height: 5' 4.5" (1.638 m)   PainSc: 0-No pain     Body mass index is 28.2 kg/m².    Physical Exam   Constitutional: She is oriented to person, place, and time. She appears well-developed and well-nourished. She is cooperative. She does not have a sickly appearance. No distress.   HENT:   Head: Normocephalic and atraumatic.   Right Ear: Hearing, tympanic membrane, external ear and ear canal normal. No tenderness.   Left Ear: Hearing, tympanic membrane, external ear and ear canal normal. No tenderness.   Nose: Nose normal.   Mouth/Throat: Oropharynx is clear and moist. Normal dentition. No oropharyngeal exudate, posterior oropharyngeal edema or posterior oropharyngeal erythema.   Eyes: Conjunctivae and lids are normal. Right eye exhibits no discharge. Left eye exhibits no discharge. Right conjunctiva is not injected. Left conjunctiva is not injected. No scleral icterus. Right eye exhibits normal extraocular motion. Left eye exhibits normal extraocular motion.   Neck: Normal range of motion. Neck supple. No JVD present. Carotid bruit is not present. No tracheal deviation and no edema present. No thyromegaly present.   Cardiovascular: Normal rate, regular rhythm, normal heart sounds and normal pulses. Exam reveals no friction rub.   No murmur heard.  Pulmonary/Chest: Effort normal and breath sounds normal. No accessory muscle usage. No respiratory distress. She has no wheezes. She has no rhonchi. She has no rales.   Musculoskeletal: She exhibits no edema.   Lymphadenopathy:        Head (right side): No submandibular adenopathy present.        Head (left side): No submandibular adenopathy present.     She has no cervical " adenopathy.   Neurological: She is alert and oriented to person, place, and time.   Skin: Skin is warm and dry. She is not diaphoretic.   Psychiatric: Her speech is normal and behavior is normal. Thought content normal. Her mood appears not anxious. Her affect is not angry, not labile and not inappropriate. She does not exhibit a depressed mood.   Nursing note and vitals reviewed.      Assessment and Plan:   Tara was seen today for diarrhea.    Diagnoses and all orders for this visit:    Urinary tract infection without hematuria, site unspecified  -     Urinalysis; Future  -     Urine culture; Future    Urine culture positive    Acute delirium    Anemia, unspecified type  -     CBC auto differential; Future  amoxicillin-clavulanate 875-125mg (AUGMENTIN) 875-125 mg per tablet; Take 1 tablet by mouth 2 (two) times daily. X 7 days        Follow-up in about 2 weeks (around 2/16/2019).    THIS NOTE WILL BE SHARED WITH THE PATIENT.

## 2019-02-08 DIAGNOSIS — Z12.11 COLON CANCER SCREENING: ICD-10-CM

## 2019-02-11 ENCOUNTER — LAB VISIT (OUTPATIENT)
Dept: LAB | Facility: HOSPITAL | Age: 74
End: 2019-02-11
Attending: INTERNAL MEDICINE
Payer: COMMERCIAL

## 2019-02-11 ENCOUNTER — ANTI-COAG VISIT (OUTPATIENT)
Dept: CARDIOLOGY | Facility: CLINIC | Age: 74
End: 2019-02-11

## 2019-02-11 DIAGNOSIS — Z79.01 LONG TERM (CURRENT) USE OF ANTICOAGULANTS: ICD-10-CM

## 2019-02-11 DIAGNOSIS — I34.0 NON-RHEUMATIC MITRAL REGURGITATION: ICD-10-CM

## 2019-02-11 DIAGNOSIS — I05.9 MITRAL VALVE DISEASE: ICD-10-CM

## 2019-02-11 DIAGNOSIS — Z95.2 S/P MVR (MITRAL VALVE REPLACEMENT): ICD-10-CM

## 2019-02-11 LAB
INR PPP: 4.3
PROTHROMBIN TIME: 44.4 SEC

## 2019-02-11 PROCEDURE — 36415 COLL VENOUS BLD VENIPUNCTURE: CPT

## 2019-02-11 PROCEDURE — 85610 PROTHROMBIN TIME: CPT

## 2019-02-12 NOTE — PROGRESS NOTES
INR high from [facility] with bactrim. Additionally, patient took incorrect higher dose than planned. Will watch closely. Bactrim should complete ~2/15    In reviewing Dr. MERT Quiroz's note from 2/6, he mentions INR goal 2.5-3.5. Referral orders had INR goal 2.0-3.0. Will check if we need to change it. ... Update: per reply, range should be 2.5-3.5 will update chart.

## 2019-02-15 RX ORDER — POTASSIUM CHLORIDE 600 MG/1
TABLET, FILM COATED, EXTENDED RELEASE ORAL
Qty: 90 TABLET | Refills: 0 | Status: SHIPPED | OUTPATIENT
Start: 2019-02-15 | End: 2019-02-25

## 2019-02-15 NOTE — PROGRESS NOTES
Pts  called to report pt forgot to hold dose on TUES and WED of this week; to c/b  with further instructions on mobile number [phone]

## 2019-02-18 ENCOUNTER — ANTI-COAG VISIT (OUTPATIENT)
Dept: CARDIOLOGY | Facility: CLINIC | Age: 74
End: 2019-02-18

## 2019-02-18 ENCOUNTER — LAB VISIT (OUTPATIENT)
Dept: LAB | Facility: HOSPITAL | Age: 74
End: 2019-02-18
Attending: INTERNAL MEDICINE
Payer: COMMERCIAL

## 2019-02-18 DIAGNOSIS — Z95.2 S/P MVR (MITRAL VALVE REPLACEMENT): ICD-10-CM

## 2019-02-18 DIAGNOSIS — Z79.01 LONG TERM (CURRENT) USE OF ANTICOAGULANTS: ICD-10-CM

## 2019-02-18 LAB
INR PPP: 1.1
PROTHROMBIN TIME: 11.4 SEC

## 2019-02-18 PROCEDURE — 36415 COLL VENOUS BLD VENIPUNCTURE: CPT

## 2019-02-18 PROCEDURE — 85610 PROTHROMBIN TIME: CPT

## 2019-02-18 NOTE — PROGRESS NOTES
INR low today. Patient was supposed to hold doses earlier last week but did not. Doses were held last few days 2/2 fear that INR would be higher with smx/tmp interaction. Antibiotics should be finished and now INR at baseline. Will boost and repeat INR in 2 days.

## 2019-02-20 ENCOUNTER — ANTI-COAG VISIT (OUTPATIENT)
Dept: CARDIOLOGY | Facility: CLINIC | Age: 74
End: 2019-02-20

## 2019-02-20 ENCOUNTER — LAB VISIT (OUTPATIENT)
Dept: LAB | Facility: HOSPITAL | Age: 74
End: 2019-02-20
Attending: INTERNAL MEDICINE
Payer: COMMERCIAL

## 2019-02-20 DIAGNOSIS — Z79.01 LONG TERM (CURRENT) USE OF ANTICOAGULANTS: ICD-10-CM

## 2019-02-20 DIAGNOSIS — Z95.2 S/P MVR (MITRAL VALVE REPLACEMENT): ICD-10-CM

## 2019-02-20 LAB
INR PPP: 1.5
PROTHROMBIN TIME: 15.6 SEC

## 2019-02-20 PROCEDURE — 36415 COLL VENOUS BLD VENIPUNCTURE: CPT

## 2019-02-20 PROCEDURE — 85610 PROTHROMBIN TIME: CPT

## 2019-02-25 ENCOUNTER — LAB VISIT (OUTPATIENT)
Dept: LAB | Facility: HOSPITAL | Age: 74
End: 2019-02-25
Attending: INTERNAL MEDICINE
Payer: COMMERCIAL

## 2019-02-25 ENCOUNTER — ANTI-COAG VISIT (OUTPATIENT)
Dept: CARDIOLOGY | Facility: CLINIC | Age: 74
End: 2019-02-25

## 2019-02-25 ENCOUNTER — HOSPITAL ENCOUNTER (OUTPATIENT)
Dept: CARDIOLOGY | Facility: CLINIC | Age: 74
Discharge: HOME | End: 2019-02-25
Payer: COMMERCIAL

## 2019-02-25 ENCOUNTER — OFFICE VISIT (OUTPATIENT)
Dept: CARDIOLOGY | Facility: CLINIC | Age: 74
End: 2019-02-25
Payer: COMMERCIAL

## 2019-02-25 VITALS
HEART RATE: 95 BPM | SYSTOLIC BLOOD PRESSURE: 112 MMHG | BODY MASS INDEX: 29.1 KG/M2 | HEIGHT: 64 IN | WEIGHT: 170.44 LBS | DIASTOLIC BLOOD PRESSURE: 72 MMHG | OXYGEN SATURATION: 99 %

## 2019-02-25 VITALS
BODY MASS INDEX: 28.51 KG/M2 | DIASTOLIC BLOOD PRESSURE: 58 MMHG | HEIGHT: 64 IN | SYSTOLIC BLOOD PRESSURE: 116 MMHG | HEART RATE: 76 BPM | WEIGHT: 167 LBS

## 2019-02-25 DIAGNOSIS — N18.30 STAGE 3 CHRONIC KIDNEY DISEASE: ICD-10-CM

## 2019-02-25 DIAGNOSIS — I34.0 NON-RHEUMATIC MITRAL REGURGITATION: ICD-10-CM

## 2019-02-25 DIAGNOSIS — I50.32 CHRONIC DIASTOLIC HEART FAILURE: ICD-10-CM

## 2019-02-25 DIAGNOSIS — Z79.01 LONG TERM (CURRENT) USE OF ANTICOAGULANTS: ICD-10-CM

## 2019-02-25 DIAGNOSIS — I25.10 CORONARY ARTERY DISEASE INVOLVING NATIVE CORONARY ARTERY OF NATIVE HEART WITHOUT ANGINA PECTORIS: ICD-10-CM

## 2019-02-25 DIAGNOSIS — I34.0 MITRAL VALVE INSUFFICIENCY, UNSPECIFIED ETIOLOGY: ICD-10-CM

## 2019-02-25 DIAGNOSIS — I73.9 PAD (PERIPHERAL ARTERY DISEASE): ICD-10-CM

## 2019-02-25 DIAGNOSIS — Z95.2 S/P MVR (MITRAL VALVE REPLACEMENT): ICD-10-CM

## 2019-02-25 DIAGNOSIS — I10 ESSENTIAL HYPERTENSION: ICD-10-CM

## 2019-02-25 LAB
ASCENDING AORTA: 3.35 CM
AV INDEX (PROSTH): 0.48
AV MEAN GRADIENT: 43.78 MMHG
AV PEAK GRADIENT: 31.36 MMHG
AV VALVE AREA: 1.36 CM2
AV VELOCITY RATIO: 0.36
BSA FOR ECHO PROCEDURE: 1.85 M2
CREAT SERPL-MCNC: 1.1 MG/DL
CV ECHO LV RWT: 0.37 CM
DOP CALC AO PEAK VEL: 2.8 M/S
DOP CALC AO VTI: 48 CM
DOP CALC LVOT AREA: 2.83 CM2
DOP CALC LVOT DIAMETER: 1.9 CM
DOP CALC LVOT PEAK VEL: 1 M/S
DOP CALC LVOT STROKE VOLUME: 65.18 CM3
DOP CALCLVOT PEAK VEL VTI: 23 CM
E WAVE DECELERATION TIME: 287.97 MSEC
E/A RATIO: 0.73
E/E' RATIO: 35.75
ECHO LV POSTERIOR WALL: 0.87 CM (ref 0.6–1.1)
ERYTHROCYTE [DISTWIDTH] IN BLOOD BY AUTOMATED COUNT: 14.6 %
EST. GFR  (AFRICAN AMERICAN): 57 ML/MIN/1.73 M^2
EST. GFR  (NON AFRICAN AMERICAN): 49 ML/MIN/1.73 M^2
FRACTIONAL SHORTENING: 35 % (ref 28–44)
HCT VFR BLD AUTO: 37.7 %
HGB BLD-MCNC: 12.1 G/DL
INR PPP: 1.6
INTERVENTRICULAR SEPTUM: 0.82 CM (ref 0.6–1.1)
LA MAJOR: 5.46 CM
LA MINOR: 5.58 CM
LA WIDTH: 2.02 CM
LEFT ATRIUM SIZE: 4.88 CM
LEFT ATRIUM VOLUME INDEX: 25.5 ML/M2
LEFT ATRIUM VOLUME: 46.25 CM3
LEFT INTERNAL DIMENSION IN SYSTOLE: 3.06 CM (ref 2.1–4)
LEFT VENTRICLE DIASTOLIC VOLUME INDEX: 56.9 ML/M2
LEFT VENTRICLE DIASTOLIC VOLUME: 103.08 ML
LEFT VENTRICLE MASS INDEX: 72.7 G/M2
LEFT VENTRICLE SYSTOLIC VOLUME INDEX: 20.2 ML/M2
LEFT VENTRICLE SYSTOLIC VOLUME: 36.62 ML
LEFT VENTRICULAR INTERNAL DIMENSION IN DIASTOLE: 4.71 CM (ref 3.5–6)
LEFT VENTRICULAR MASS: 131.77 G
LV LATERAL E/E' RATIO: 28.6
LV SEPTAL E/E' RATIO: 47.67
MCH RBC QN AUTO: 28.6 PG
MCHC RBC AUTO-ENTMCNC: 32.1 G/DL
MCV RBC AUTO: 89 FL
MV PEAK A VEL: 1.97 M/S
MV PEAK E VEL: 1.43 M/S
MV STENOSIS PRESSURE HALF TIME: 140 MS
MV VALVE AREA P 1/2 METHOD: 1.57 CM2
PISA TR MAX VEL: 2.45 M/S
PLATELET # BLD AUTO: 141 K/UL
PMV BLD AUTO: 10 FL
PROTHROMBIN TIME: 16.7 SEC
RA MAJOR: 4.93 CM
RA PRESSURE: 3 MMHG
RA WIDTH: 4.83 CM
RBC # BLD AUTO: 4.23 M/UL
RIGHT VENTRICULAR END-DIASTOLIC DIMENSION: 3.6 CM
RV TISSUE DOPPLER FREE WALL SYSTOLIC VELOCITY 1 (APICAL 4 CHAMBER VIEW): 6.58 M/S
SINUS: 3.25 CM
STJ: 2.68 CM
TDI LATERAL: 0.05
TDI SEPTAL: 0.03
TDI: 0.04
TR MAX PG: 24.01 MMHG
TV REST PULMONARY ARTERY PRESSURE: 27 MMHG
WBC # BLD AUTO: 6.63 K/UL

## 2019-02-25 PROCEDURE — 3074F PR MOST RECENT SYSTOLIC BLOOD PRESSURE < 130 MM HG: ICD-10-PCS | Mod: CPTII,S$GLB,, | Performed by: INTERNAL MEDICINE

## 2019-02-25 PROCEDURE — 1101F PR PT FALLS ASSESS DOC 0-1 FALLS W/OUT INJ PAST YR: ICD-10-PCS | Mod: CPTII,S$GLB,, | Performed by: INTERNAL MEDICINE

## 2019-02-25 PROCEDURE — 99999 PR PBB SHADOW E&M-EST. PATIENT-LVL IV: ICD-10-PCS | Mod: PBBFAC,,,

## 2019-02-25 PROCEDURE — 3074F SYST BP LT 130 MM HG: CPT | Mod: CPTII,S$GLB,, | Performed by: INTERNAL MEDICINE

## 2019-02-25 PROCEDURE — 36415 COLL VENOUS BLD VENIPUNCTURE: CPT

## 2019-02-25 PROCEDURE — 85027 COMPLETE CBC AUTOMATED: CPT

## 2019-02-25 PROCEDURE — 99214 PR OFFICE/OUTPT VISIT, EST, LEVL IV, 30-39 MIN: ICD-10-PCS | Mod: S$GLB,,, | Performed by: INTERNAL MEDICINE

## 2019-02-25 PROCEDURE — 93306 TRANSTHORACIC ECHO (TTE) COMPLETE (CUPID ONLY): ICD-10-PCS | Mod: S$GLB,,, | Performed by: INTERNAL MEDICINE

## 2019-02-25 PROCEDURE — 82565 ASSAY OF CREATININE: CPT

## 2019-02-25 PROCEDURE — 3078F PR MOST RECENT DIASTOLIC BLOOD PRESSURE < 80 MM HG: ICD-10-PCS | Mod: CPTII,S$GLB,, | Performed by: INTERNAL MEDICINE

## 2019-02-25 PROCEDURE — 93306 TTE W/DOPPLER COMPLETE: CPT | Mod: S$GLB,,, | Performed by: INTERNAL MEDICINE

## 2019-02-25 PROCEDURE — 1101F PT FALLS ASSESS-DOCD LE1/YR: CPT | Mod: CPTII,S$GLB,, | Performed by: INTERNAL MEDICINE

## 2019-02-25 PROCEDURE — 85610 PROTHROMBIN TIME: CPT

## 2019-02-25 PROCEDURE — 99214 OFFICE O/P EST MOD 30 MIN: CPT | Mod: S$GLB,,, | Performed by: INTERNAL MEDICINE

## 2019-02-25 PROCEDURE — 99999 PR PBB SHADOW E&M-EST. PATIENT-LVL IV: CPT | Mod: PBBFAC,,,

## 2019-02-25 PROCEDURE — 3078F DIAST BP <80 MM HG: CPT | Mod: CPTII,S$GLB,, | Performed by: INTERNAL MEDICINE

## 2019-02-25 NOTE — PROGRESS NOTES
Subjective:    Patient ID:  Tara Mondragon is a 73 y.o. female who presents for follow-up of TMVR.     Referring: Dr. Quiroz    HPI  Ms. Mondragon presents for 1 mon f/u s/p 26mm Alex S3 TMVR via transeptal access. She has done very well since her procedure and she is without complaints today. Her HERNANDES is significantly improved. She denies CP, PND, orthopnea, or LE edema. 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  Her INRs have been subtherapeutic. She recently had a UTI and was on Bactrim, so her Coumadin was decreased to prevent supratherapeutic INRs. INR today was 1.6. She will need to remain on Coumadin until April 22; she will continue on ASA alone thereafter. She continues to follow with Dr. Quiroz.     NYHA Class II, CCS Class I    Review of Systems   Constitution: Negative for chills, diaphoresis, fever, weakness, weight gain and weight loss.   HENT: Negative for sore throat.    Eyes: Negative for blurred vision, vision loss in left eye, vision loss in right eye and visual disturbance.   Cardiovascular: Negative for chest pain, claudication, dyspnea on exertion, leg swelling, near-syncope, orthopnea, palpitations, paroxysmal nocturnal dyspnea and syncope.   Respiratory: Negative for cough, hemoptysis, shortness of breath, sputum production and wheezing.    Endocrine: Negative for cold intolerance and heat intolerance.   Hematologic/Lymphatic: Negative for adenopathy. Does not bruise/bleed easily.   Skin: Negative for rash.   Musculoskeletal: Negative for falls, muscle weakness and myalgias.   Gastrointestinal: Negative for abdominal pain, change in bowel habit, constipation, diarrhea, melena and nausea.   Genitourinary: Negative for bladder incontinence.   Neurological: Negative for dizziness, focal weakness, headaches, light-headedness and numbness.   Psychiatric/Behavioral: Negative for altered mental status.         Vitals:    02/25/19 1359 02/25/19 1402  "  BP: 111/61 112/72   BP Location: Right arm Left arm   Patient Position: Sitting Sitting   BP Method: Large (Automatic) Large (Automatic)   Pulse: 95 95   SpO2: 99%    Weight: 77.3 kg (170 lb 6.7 oz)    Height: 5' 4" (1.626 m)    Body mass index is 29.25 kg/m².    Objective:    Physical Exam   Constitutional: She is oriented to person, place, and time. She appears well-developed and well-nourished. No distress.   HENT:   Head: Normocephalic and atraumatic.   Mouth/Throat: Oropharynx is clear and moist.   Eyes: Conjunctivae and EOM are normal. Pupils are equal, round, and reactive to light. No scleral icterus.   Neck: Neck supple. No JVD present. No tracheal deviation present.   Cardiovascular: Normal rate and regular rhythm. Exam reveals no gallop and no friction rub.   No murmur heard.  Pulmonary/Chest: Effort normal and breath sounds normal. No respiratory distress. She has no wheezes. She has no rales. She exhibits no tenderness.   Abdominal: Soft. Bowel sounds are normal. She exhibits no distension. There is no hepatosplenomegaly. There is no tenderness.   Musculoskeletal: She exhibits no edema or tenderness.   Neurological: She is alert and oriented to person, place, and time.   Skin: Skin is warm and dry. No rash noted. No erythema.   Psychiatric: She has a normal mood and affect. Her behavior is normal.         Assessment:       Non-rheumatic mitral regurgitation  Flail leaflet of 27mm Mosaic MVR with severe MR at high risk for redo surgery per Eren.  S/p successful Transeptal Mitral Ricardo TAVR with a 26mm Alex S3.  Pre LVOT gradient 3.0, post LVOT Gradient 2.5. MR eliminated. Mean mitral gradient 2, Pk V 1.0. No PVL.  Small ASD from transeptal dilation. Small L to R Shunt.  On ASA and Coumadin (Coumadin for 3 months post-procedure).     Chronic diastolic heart failure  Well compensated clinically at this time.     CAD (coronary artery disease)  S/p CABG x 2 (LIMA to LAD and SVG to RCA)  Coronary " angiogram (11/28/18): Patent LIMA to LAD, occluded SVG to RCA, native RCA patent, LCx (separate ostia) patent with patent mid stent.  On ASA, statin, and b-blocker.     PAD (peripheral artery disease)  Occluded L mid SFA by US.   On Pletal.   Kong Class IIb sx.     Hypertension  Controlled on current regimen.     Stage 3 chronic kidney disease  Creatinine 1.8, GFR 27       Plan:       Continue ASA + Coumadin until 4/22/19, then ASA alone indefinitely.    SBEP for life.   F/U in 1 year (Jan 2020) with echo and labs.     Staff:  I have personally taken the history and examined this patient and agree with the fellow's note as stated above and amended it accordingly :-) This lady has a mitral Ricardo TAVR and can be managed like any patient with a bioprosthetic mitral valve. Three months of warfarin then ASA alone.  Return to work without limitation.  Can f/u in [place] with any cardiologist.  Will see her back in one year for TMVR f/u.

## 2019-02-28 ENCOUNTER — ANTI-COAG VISIT (OUTPATIENT)
Dept: CARDIOLOGY | Facility: CLINIC | Age: 74
End: 2019-02-28

## 2019-02-28 ENCOUNTER — LAB VISIT (OUTPATIENT)
Dept: LAB | Facility: HOSPITAL | Age: 74
End: 2019-02-28
Attending: INTERNAL MEDICINE
Payer: COMMERCIAL

## 2019-02-28 DIAGNOSIS — Z79.01 LONG TERM (CURRENT) USE OF ANTICOAGULANTS: ICD-10-CM

## 2019-02-28 DIAGNOSIS — Z95.2 S/P MVR (MITRAL VALVE REPLACEMENT): ICD-10-CM

## 2019-02-28 LAB
INR PPP: 1.8
PROTHROMBIN TIME: 18.9 SEC

## 2019-02-28 PROCEDURE — 36415 COLL VENOUS BLD VENIPUNCTURE: CPT

## 2019-02-28 PROCEDURE — 85610 PROTHROMBIN TIME: CPT

## 2019-03-06 ENCOUNTER — ANTI-COAG VISIT (OUTPATIENT)
Dept: CARDIOLOGY | Facility: CLINIC | Age: 74
End: 2019-03-06

## 2019-03-06 ENCOUNTER — LAB VISIT (OUTPATIENT)
Dept: LAB | Facility: HOSPITAL | Age: 74
End: 2019-03-06
Attending: INTERNAL MEDICINE
Payer: COMMERCIAL

## 2019-03-06 DIAGNOSIS — Z95.2 S/P MVR (MITRAL VALVE REPLACEMENT): ICD-10-CM

## 2019-03-06 DIAGNOSIS — Z79.01 LONG TERM (CURRENT) USE OF ANTICOAGULANTS: ICD-10-CM

## 2019-03-06 LAB
INR PPP: 2.6
PROTHROMBIN TIME: 26.5 SEC

## 2019-03-06 PROCEDURE — 36415 COLL VENOUS BLD VENIPUNCTURE: CPT

## 2019-03-06 PROCEDURE — 85610 PROTHROMBIN TIME: CPT

## 2019-03-13 ENCOUNTER — LAB VISIT (OUTPATIENT)
Dept: LAB | Facility: HOSPITAL | Age: 74
End: 2019-03-13
Attending: INTERNAL MEDICINE
Payer: COMMERCIAL

## 2019-03-13 ENCOUNTER — ANTI-COAG VISIT (OUTPATIENT)
Dept: CARDIOLOGY | Facility: CLINIC | Age: 74
End: 2019-03-13

## 2019-03-13 DIAGNOSIS — Z79.01 LONG TERM (CURRENT) USE OF ANTICOAGULANTS: ICD-10-CM

## 2019-03-13 DIAGNOSIS — Z95.2 S/P MVR (MITRAL VALVE REPLACEMENT): ICD-10-CM

## 2019-03-13 DIAGNOSIS — Z79.01 LONG TERM (CURRENT) USE OF ANTICOAGULANTS: Primary | ICD-10-CM

## 2019-03-13 LAB
INR PPP: 3.2
PROTHROMBIN TIME: 33.1 SEC

## 2019-03-13 PROCEDURE — 36415 COLL VENOUS BLD VENIPUNCTURE: CPT

## 2019-03-13 PROCEDURE — 85610 PROTHROMBIN TIME: CPT

## 2019-03-19 ENCOUNTER — LAB VISIT (OUTPATIENT)
Dept: LAB | Facility: HOSPITAL | Age: 74
End: 2019-03-19
Attending: INTERNAL MEDICINE
Payer: COMMERCIAL

## 2019-03-19 ENCOUNTER — ANTI-COAG VISIT (OUTPATIENT)
Dept: CARDIOLOGY | Facility: CLINIC | Age: 74
End: 2019-03-19

## 2019-03-19 DIAGNOSIS — Z79.01 LONG TERM (CURRENT) USE OF ANTICOAGULANTS: ICD-10-CM

## 2019-03-19 DIAGNOSIS — Z95.2 S/P MVR (MITRAL VALVE REPLACEMENT): ICD-10-CM

## 2019-03-19 LAB
INR PPP: 2.5
PROTHROMBIN TIME: 25.9 SEC

## 2019-03-19 PROCEDURE — 36415 COLL VENOUS BLD VENIPUNCTURE: CPT

## 2019-03-19 PROCEDURE — 85610 PROTHROMBIN TIME: CPT

## 2019-03-20 ENCOUNTER — TELEPHONE (OUTPATIENT)
Dept: CARDIOLOGY | Facility: CLINIC | Age: 74
End: 2019-03-20

## 2019-03-20 NOTE — TELEPHONE ENCOUNTER
----- Message from Lefty Bailey MD sent at 3/20/2019  1:05 PM CDT -----  Please make sure she has an appt with me in June Thanks  CRYS

## 2019-03-25 DIAGNOSIS — Z87.891 FORMER SMOKER: ICD-10-CM

## 2019-03-25 DIAGNOSIS — R06.02 SOB (SHORTNESS OF BREATH): ICD-10-CM

## 2019-03-26 RX ORDER — ALBUTEROL SULFATE 90 UG/1
AEROSOL, METERED RESPIRATORY (INHALATION)
Qty: 18 G | Refills: 0 | Status: SHIPPED | OUTPATIENT
Start: 2019-03-26 | End: 2019-04-26 | Stop reason: SDUPTHER

## 2019-04-02 ENCOUNTER — LAB VISIT (OUTPATIENT)
Dept: LAB | Facility: HOSPITAL | Age: 74
End: 2019-04-02
Attending: INTERNAL MEDICINE

## 2019-04-02 ENCOUNTER — ANTI-COAG VISIT (OUTPATIENT)
Dept: CARDIOLOGY | Facility: CLINIC | Age: 74
End: 2019-04-02

## 2019-04-02 DIAGNOSIS — Z79.01 LONG TERM (CURRENT) USE OF ANTICOAGULANTS: ICD-10-CM

## 2019-04-02 DIAGNOSIS — Z95.2 S/P MVR (MITRAL VALVE REPLACEMENT): ICD-10-CM

## 2019-04-02 LAB
INR PPP: 1.6 (ref 0.8–1.2)
PROTHROMBIN TIME: 16.7 SEC (ref 9–12.5)

## 2019-04-02 PROCEDURE — 85610 PROTHROMBIN TIME: CPT

## 2019-04-02 PROCEDURE — 36415 COLL VENOUS BLD VENIPUNCTURE: CPT

## 2019-04-02 NOTE — PROGRESS NOTES
INR low but patient reports taking dose lower than instructed. Will boost and advise resume higher dose.

## 2019-04-03 NOTE — PROGRESS NOTES
called and was given coumadin instructions: 4/02 -6mg, 4/03 start: 2mg daily except 4mg-Friday and Tuesday and to get lab drawn 4/09,  states understanding

## 2019-04-09 ENCOUNTER — LAB VISIT (OUTPATIENT)
Dept: LAB | Facility: HOSPITAL | Age: 74
End: 2019-04-09
Attending: INTERNAL MEDICINE
Payer: COMMERCIAL

## 2019-04-09 ENCOUNTER — ANTI-COAG VISIT (OUTPATIENT)
Dept: CARDIOLOGY | Facility: CLINIC | Age: 74
End: 2019-04-09
Payer: COMMERCIAL

## 2019-04-09 DIAGNOSIS — Z95.2 S/P MVR (MITRAL VALVE REPLACEMENT): ICD-10-CM

## 2019-04-09 DIAGNOSIS — Z79.01 LONG TERM (CURRENT) USE OF ANTICOAGULANTS: ICD-10-CM

## 2019-04-09 LAB
INR PPP: 2.4 (ref 0.8–1.2)
PROTHROMBIN TIME: 25.1 SEC (ref 9–12.5)

## 2019-04-09 PROCEDURE — 93793 ANTICOAG MGMT PT WARFARIN: CPT | Mod: S$GLB,,,

## 2019-04-09 PROCEDURE — 93793 PR ANTICOAGULANT MGMT FOR PT TAKING WARFARIN: ICD-10-PCS | Mod: S$GLB,,,

## 2019-04-09 PROCEDURE — 36415 COLL VENOUS BLD VENIPUNCTURE: CPT

## 2019-04-09 PROCEDURE — 85610 PROTHROMBIN TIME: CPT

## 2019-04-09 NOTE — PROGRESS NOTES
INR just barely below goal today after boost and increased dose plan for low INR last week. Will continue current dose and f/u INR next week.

## 2019-04-11 ENCOUNTER — TELEPHONE (OUTPATIENT)
Dept: CARDIOLOGY | Facility: CLINIC | Age: 74
End: 2019-04-11

## 2019-04-16 ENCOUNTER — LAB VISIT (OUTPATIENT)
Dept: LAB | Facility: HOSPITAL | Age: 74
End: 2019-04-16
Attending: INTERNAL MEDICINE
Payer: COMMERCIAL

## 2019-04-16 ENCOUNTER — ANTI-COAG VISIT (OUTPATIENT)
Dept: CARDIOLOGY | Facility: CLINIC | Age: 74
End: 2019-04-16
Payer: COMMERCIAL

## 2019-04-16 DIAGNOSIS — Z79.01 LONG TERM (CURRENT) USE OF ANTICOAGULANTS: ICD-10-CM

## 2019-04-16 DIAGNOSIS — Z95.2 S/P MVR (MITRAL VALVE REPLACEMENT): ICD-10-CM

## 2019-04-16 LAB
INR PPP: 2.1 (ref 0.8–1.2)
PROTHROMBIN TIME: 21.6 SEC (ref 9–12.5)

## 2019-04-16 PROCEDURE — 85610 PROTHROMBIN TIME: CPT

## 2019-04-16 PROCEDURE — 93793 ANTICOAG MGMT PT WARFARIN: CPT | Mod: S$GLB,,,

## 2019-04-16 PROCEDURE — 93793 PR ANTICOAGULANT MGMT FOR PT TAKING WARFARIN: ICD-10-PCS | Mod: S$GLB,,,

## 2019-04-16 PROCEDURE — 36415 COLL VENOUS BLD VENIPUNCTURE: CPT

## 2019-04-24 ENCOUNTER — LAB VISIT (OUTPATIENT)
Dept: LAB | Facility: HOSPITAL | Age: 74
End: 2019-04-24
Attending: INTERNAL MEDICINE
Payer: COMMERCIAL

## 2019-04-24 ENCOUNTER — ANTI-COAG VISIT (OUTPATIENT)
Dept: CARDIOLOGY | Facility: CLINIC | Age: 74
End: 2019-04-24
Payer: COMMERCIAL

## 2019-04-24 DIAGNOSIS — Z95.2 S/P MVR (MITRAL VALVE REPLACEMENT): ICD-10-CM

## 2019-04-24 DIAGNOSIS — Z79.01 LONG TERM (CURRENT) USE OF ANTICOAGULANTS: ICD-10-CM

## 2019-04-24 LAB
INR PPP: 1.8 (ref 0.8–1.2)
PROTHROMBIN TIME: 18.4 SEC (ref 9–12.5)

## 2019-04-24 PROCEDURE — 85610 PROTHROMBIN TIME: CPT

## 2019-04-24 PROCEDURE — 36415 COLL VENOUS BLD VENIPUNCTURE: CPT

## 2019-04-24 PROCEDURE — 93793 ANTICOAG MGMT PT WARFARIN: CPT | Mod: S$GLB,,,

## 2019-04-24 PROCEDURE — 93793 PR ANTICOAGULANT MGMT FOR PT TAKING WARFARIN: ICD-10-PCS | Mod: S$GLB,,,

## 2019-04-25 ENCOUNTER — ANTI-COAG VISIT (OUTPATIENT)
Dept: CARDIOLOGY | Facility: CLINIC | Age: 74
End: 2019-04-25

## 2019-04-25 ENCOUNTER — TELEPHONE (OUTPATIENT)
Dept: CARDIOLOGY | Facility: CLINIC | Age: 74
End: 2019-04-25

## 2019-04-25 DIAGNOSIS — Z95.2 S/P MVR (MITRAL VALVE REPLACEMENT): ICD-10-CM

## 2019-04-25 DIAGNOSIS — Z79.01 LONG TERM (CURRENT) USE OF ANTICOAGULANTS: ICD-10-CM

## 2019-04-25 NOTE — TELEPHONE ENCOUNTER
----- Message from Kayla Padron PharmD sent at 4/25/2019  9:28 AM CDT -----  Regarding: warfarin discontinuation?  Please advise.     This patient was enrolled with [facility] in Jan after MAVR. Cardiology office visit 2/25 states warfarin x3 months. We are now at 3 months post-op. Would you like for her to d/c warfarin?

## 2019-04-25 NOTE — PROGRESS NOTES
Received following message in response to inquiry about stopping warfarin now that patient is 3 months post-op:    Dr Faria said it is fine to stop as long she does not have a history of afib.  I do not see that she had afib.     Thanks,     Antonette Flower RN       We will d/c patient from our clinic at this time.

## 2019-04-25 NOTE — PROGRESS NOTES
INR not at goal - patient reports taking doses as instructed and does not note any other changes to account for low level. Medications, chart, and patient findings reviewed. See calendar for adjustments to dose and follow up plan. Have messaged cardiologists to advise whether she can stop warfarin now as it is 3 months post-op (office visit 2/25 notes warfarin x3 months).

## 2019-04-26 DIAGNOSIS — R06.02 SOB (SHORTNESS OF BREATH): ICD-10-CM

## 2019-04-26 DIAGNOSIS — Z87.891 FORMER SMOKER: ICD-10-CM

## 2019-04-26 RX ORDER — ALBUTEROL SULFATE 90 UG/1
AEROSOL, METERED RESPIRATORY (INHALATION)
Qty: 18 G | Refills: 0 | Status: SHIPPED | OUTPATIENT
Start: 2019-04-26 | End: 2019-05-30 | Stop reason: SDUPTHER

## 2019-05-02 DIAGNOSIS — Z12.31 ENCOUNTER FOR SCREENING MAMMOGRAM FOR MALIGNANT NEOPLASM OF BREAST: Primary | ICD-10-CM

## 2019-05-30 DIAGNOSIS — Z87.891 FORMER SMOKER: ICD-10-CM

## 2019-05-30 DIAGNOSIS — R06.02 SOB (SHORTNESS OF BREATH): ICD-10-CM

## 2019-05-30 RX ORDER — ALBUTEROL SULFATE 90 UG/1
AEROSOL, METERED RESPIRATORY (INHALATION)
Qty: 18 G | Refills: 3 | Status: SHIPPED | OUTPATIENT
Start: 2019-05-30

## 2019-06-07 NOTE — PLAN OF CARE
Patient lying in bed, A/O. Verbal, able to make needs known. No S/S of pain or discomfort noted at this time. NADN.  at bedside. No CP reported this shift. Patient informed of LINDA and stress test to be performed tomorrow- verbalizes understanding. Plan of care and medications reviewed with patient and patient's - verbalizes understanding. Bed in lowest position, side rails up x 2, call light within reach. Will continue to monitor patient.    No

## 2019-06-17 ENCOUNTER — TELEPHONE (OUTPATIENT)
Dept: CARDIOLOGY | Facility: CLINIC | Age: 74
End: 2019-06-17

## 2019-06-17 NOTE — TELEPHONE ENCOUNTER
Returned pt phone call     Pt has clinical deshaun w/ Dr. Bailey tomorrow, stated she will address her concerns then

## 2019-06-17 NOTE — TELEPHONE ENCOUNTER
----- Message from Vasu Rivero sent at 6/17/2019  9:52 AM CDT -----  Contact: [phone]  Patient requested to speak with the nurse about her medications. Please call.

## 2019-06-17 NOTE — TELEPHONE ENCOUNTER
----- Message from Audelia Ortega sent at 6/17/2019  8:49 AM CDT -----  Contact: [phone]/self  Patient requesting to speak with you regarding her upcoming dental appt and the medications she is currently taking. Please call and advise.

## 2019-06-18 ENCOUNTER — OFFICE VISIT (OUTPATIENT)
Dept: CARDIOLOGY | Facility: CLINIC | Age: 74
End: 2019-06-18
Payer: COMMERCIAL

## 2019-06-18 VITALS
HEART RATE: 92 BPM | OXYGEN SATURATION: 97 % | DIASTOLIC BLOOD PRESSURE: 70 MMHG | BODY MASS INDEX: 29.69 KG/M2 | SYSTOLIC BLOOD PRESSURE: 118 MMHG | WEIGHT: 172.94 LBS

## 2019-06-18 DIAGNOSIS — I05.9 MITRAL VALVE DISEASE: Primary | ICD-10-CM

## 2019-06-18 DIAGNOSIS — E78.2 MIXED HYPERLIPIDEMIA: ICD-10-CM

## 2019-06-18 DIAGNOSIS — I50.9 CONGESTIVE HEART FAILURE, UNSPECIFIED HF CHRONICITY, UNSPECIFIED HEART FAILURE TYPE: ICD-10-CM

## 2019-06-18 DIAGNOSIS — Z95.2 S/P MVR (MITRAL VALVE REPLACEMENT): ICD-10-CM

## 2019-06-18 DIAGNOSIS — I73.9 PAD (PERIPHERAL ARTERY DISEASE): ICD-10-CM

## 2019-06-18 DIAGNOSIS — E66.9 OBESITY (BMI 30.0-34.9): ICD-10-CM

## 2019-06-18 DIAGNOSIS — E05.90 HYPERTHYROIDISM: ICD-10-CM

## 2019-06-18 DIAGNOSIS — I34.0 NON-RHEUMATIC MITRAL REGURGITATION: ICD-10-CM

## 2019-06-18 DIAGNOSIS — I50.33 ACUTE ON CHRONIC DIASTOLIC HEART FAILURE: ICD-10-CM

## 2019-06-18 DIAGNOSIS — I34.0 SEVERE MITRAL REGURGITATION: ICD-10-CM

## 2019-06-18 DIAGNOSIS — N18.30 STAGE 3 CHRONIC KIDNEY DISEASE: ICD-10-CM

## 2019-06-18 DIAGNOSIS — I35.0 NONRHEUMATIC AORTIC VALVE STENOSIS: ICD-10-CM

## 2019-06-18 DIAGNOSIS — I25.10 CORONARY ARTERY DISEASE INVOLVING NATIVE CORONARY ARTERY OF NATIVE HEART WITHOUT ANGINA PECTORIS: ICD-10-CM

## 2019-06-18 DIAGNOSIS — I10 ESSENTIAL HYPERTENSION: ICD-10-CM

## 2019-06-18 DIAGNOSIS — Z79.01 LONG TERM (CURRENT) USE OF ANTICOAGULANTS: ICD-10-CM

## 2019-06-18 DIAGNOSIS — Z95.1 S/P CABG X 2: ICD-10-CM

## 2019-06-18 PROCEDURE — 99999 PR PBB SHADOW E&M-EST. PATIENT-LVL III: CPT | Mod: PBBFAC,,, | Performed by: INTERNAL MEDICINE

## 2019-06-18 PROCEDURE — 3074F PR MOST RECENT SYSTOLIC BLOOD PRESSURE < 130 MM HG: ICD-10-PCS | Mod: CPTII,S$GLB,, | Performed by: INTERNAL MEDICINE

## 2019-06-18 PROCEDURE — 3074F SYST BP LT 130 MM HG: CPT | Mod: CPTII,S$GLB,, | Performed by: INTERNAL MEDICINE

## 2019-06-18 PROCEDURE — 99214 OFFICE O/P EST MOD 30 MIN: CPT | Mod: S$GLB,,, | Performed by: INTERNAL MEDICINE

## 2019-06-18 PROCEDURE — 3078F PR MOST RECENT DIASTOLIC BLOOD PRESSURE < 80 MM HG: ICD-10-PCS | Mod: CPTII,S$GLB,, | Performed by: INTERNAL MEDICINE

## 2019-06-18 PROCEDURE — 99214 PR OFFICE/OUTPT VISIT, EST, LEVL IV, 30-39 MIN: ICD-10-PCS | Mod: S$GLB,,, | Performed by: INTERNAL MEDICINE

## 2019-06-18 PROCEDURE — 99999 PR PBB SHADOW E&M-EST. PATIENT-LVL III: ICD-10-PCS | Mod: PBBFAC,,, | Performed by: INTERNAL MEDICINE

## 2019-06-18 PROCEDURE — 1101F PT FALLS ASSESS-DOCD LE1/YR: CPT | Mod: CPTII,S$GLB,, | Performed by: INTERNAL MEDICINE

## 2019-06-18 PROCEDURE — 1101F PR PT FALLS ASSESS DOC 0-1 FALLS W/OUT INJ PAST YR: ICD-10-PCS | Mod: CPTII,S$GLB,, | Performed by: INTERNAL MEDICINE

## 2019-06-18 PROCEDURE — 3078F DIAST BP <80 MM HG: CPT | Mod: CPTII,S$GLB,, | Performed by: INTERNAL MEDICINE

## 2019-06-18 RX ORDER — CLINDAMYCIN HYDROCHLORIDE 300 MG/1
600 CAPSULE ORAL ONCE
Qty: 2 CAPSULE | Refills: 0 | Status: SHIPPED | OUTPATIENT
Start: 2019-06-18 | End: 2019-06-18

## 2019-06-27 DIAGNOSIS — F41.9 ANXIETY: ICD-10-CM

## 2019-06-27 RX ORDER — ALPRAZOLAM 0.25 MG/1
TABLET ORAL
Qty: 30 TABLET | Refills: 0 | Status: SHIPPED | OUTPATIENT
Start: 2019-06-27

## 2019-07-08 ENCOUNTER — TELEPHONE (OUTPATIENT)
Dept: FAMILY MEDICINE | Facility: CLINIC | Age: 74
End: 2019-07-08

## 2019-07-08 RX ORDER — TRIAMCINOLONE ACETONIDE 1 MG/G
OINTMENT TOPICAL 2 TIMES DAILY
Qty: 30 G | Refills: 2 | Status: SHIPPED | OUTPATIENT
Start: 2019-07-08

## 2019-07-08 NOTE — TELEPHONE ENCOUNTER
----- Message from Luisana Gilbert sent at 7/8/2019  9:18 AM CDT -----  Contact: [facility]/[phone]  Type:  RX Refill Request    Who Called: [facility]    RX Name and Strength:  triamcinolone acetonide 0.1% (KENALOG) 0.1 % ointment      How is the patient currently taking it? (ex. 1XDay):   Apply topically 2 (two) times daily      Is this a 30 day or 90 day RX: 30    Preferred Pharmacy with phone number: [facility] [phone]    Local or Mail Order: local    Ordering Provider: Esperanza    Would the patient rather a call back or a response via [facility]? call    Best Call Back Number: [phone]    Additional Information: none

## 2019-09-16 ENCOUNTER — TELEPHONE (OUTPATIENT)
Dept: CARDIOLOGY | Facility: CLINIC | Age: 74
End: 2019-09-16

## 2019-09-16 NOTE — TELEPHONE ENCOUNTER
Pt had a left heart cath on 11-28-19, Mitral valve study on 01-22-19. Pt daughter called stating that the pt was unable to fly due to her diagnosis and her procedure. Pt daughter would like a note to the airline company ([employer]) stating that the pt was unable to fly from November  till February and that she was treated under a doctors care between those months.

## 2019-09-16 NOTE — TELEPHONE ENCOUNTER
Called and spoke with pt daughter and informed her that the letter was ready and I will send it in the mail to the address she provided.

## 2019-09-16 NOTE — TELEPHONE ENCOUNTER
----- Message from Christine Snyder sent at 9/16/2019 10:05 AM CDT -----  Contact: Sandra Daughter - [phone]  Daughter needs a call back she has questions. She needs a  Note  for airline.  Please advise

## 2019-11-05 DIAGNOSIS — I25.10 CORONARY ARTERY DISEASE INVOLVING NATIVE CORONARY ARTERY OF NATIVE HEART WITHOUT ANGINA PECTORIS: ICD-10-CM

## 2019-11-05 DIAGNOSIS — I10 ESSENTIAL HYPERTENSION: ICD-10-CM

## 2019-11-05 RX ORDER — ATORVASTATIN CALCIUM 40 MG/1
TABLET, FILM COATED ORAL
Qty: 90 TABLET | Refills: 4 | Status: SHIPPED | OUTPATIENT
Start: 2019-11-05 | End: 2020-12-09 | Stop reason: SDUPTHER

## 2019-11-19 DIAGNOSIS — I10 ESSENTIAL HYPERTENSION: ICD-10-CM

## 2019-11-19 DIAGNOSIS — I51.89 DIASTOLIC DYSFUNCTION: ICD-10-CM

## 2019-11-19 RX ORDER — SPIRONOLACTONE 25 MG/1
TABLET ORAL
Qty: 30 TABLET | Refills: 0 | OUTPATIENT
Start: 2019-11-19

## 2019-11-22 DIAGNOSIS — I10 ESSENTIAL HYPERTENSION: ICD-10-CM

## 2019-11-22 DIAGNOSIS — I51.89 DIASTOLIC DYSFUNCTION: ICD-10-CM

## 2019-11-25 RX ORDER — SPIRONOLACTONE 25 MG/1
25 TABLET ORAL DAILY
Qty: 30 TABLET | Refills: 6 | Status: SHIPPED | OUTPATIENT
Start: 2019-11-25 | End: 2020-11-24

## 2020-05-22 RX ORDER — FUROSEMIDE 40 MG/1
TABLET ORAL
Qty: 60 TABLET | Refills: 11 | Status: SHIPPED | OUTPATIENT
Start: 2020-05-22

## 2020-10-05 ENCOUNTER — PATIENT MESSAGE (OUTPATIENT)
Dept: ADMINISTRATIVE | Facility: HOSPITAL | Age: 75
End: 2020-10-05

## 2020-12-09 DIAGNOSIS — I25.10 CORONARY ARTERY DISEASE INVOLVING NATIVE CORONARY ARTERY OF NATIVE HEART WITHOUT ANGINA PECTORIS: ICD-10-CM

## 2020-12-09 DIAGNOSIS — I10 ESSENTIAL HYPERTENSION: ICD-10-CM

## 2020-12-09 RX ORDER — ATORVASTATIN CALCIUM 40 MG/1
40 TABLET, FILM COATED ORAL DAILY
Qty: 90 TABLET | Refills: 0 | Status: SHIPPED | OUTPATIENT
Start: 2020-12-09 | End: 2021-03-09

## 2021-01-04 ENCOUNTER — PATIENT MESSAGE (OUTPATIENT)
Dept: ADMINISTRATIVE | Facility: HOSPITAL | Age: 76
End: 2021-01-04

## 2021-04-12 ENCOUNTER — PATIENT MESSAGE (OUTPATIENT)
Dept: ADMINISTRATIVE | Facility: HOSPITAL | Age: 76
End: 2021-04-12

## 2021-04-16 ENCOUNTER — PATIENT MESSAGE (OUTPATIENT)
Dept: RESEARCH | Facility: HOSPITAL | Age: 76
End: 2021-04-16

## 2021-07-06 ENCOUNTER — PATIENT MESSAGE (OUTPATIENT)
Dept: ADMINISTRATIVE | Facility: HOSPITAL | Age: 76
End: 2021-07-06

## 2021-10-04 ENCOUNTER — PATIENT MESSAGE (OUTPATIENT)
Dept: ADMINISTRATIVE | Facility: HOSPITAL | Age: 76
End: 2021-10-04

## 2022-09-29 NOTE — SUBJECTIVE & OBJECTIVE
Interval History: No events overnight. No complaints this AM.     Objective:     Vital Signs (Most Recent):  Temp: 97.7 °F (36.5 °C) (01/23/19 1102)  Pulse: 104 (01/23/19 1102)  Resp: (!) 29 (01/23/19 1102)  BP: 100/60 (01/23/19 1102)  SpO2: 100 % (01/23/19 1102) Vital Signs (24h Range):  Temp:  [97.6 °F (36.4 °C)-98.5 °F (36.9 °C)] 97.7 °F (36.5 °C)  Pulse:  [] 104  Resp:  [16-37] 29  SpO2:  [86 %-100 %] 100 %  BP: ()/(50-65) 100/60  Arterial Line BP: ()/(43-70) 117/63     Weight: 78 kg (172 lb)  Body mass index is 29.52 kg/m².    SpO2: 100 %  O2 Device (Oxygen Therapy): nasal cannula      Intake/Output Summary (Last 24 hours) at 1/23/2019 1158  Last data filed at 1/23/2019 1100  Gross per 24 hour   Intake 2277.5 ml   Output 2155 ml   Net 122.5 ml       Lines/Drains/Airways     Peripheral Intravenous Line                 Peripheral IV - Single Lumen 01/23/19 1058 Anterior;Right Forearm less than 1 day                Physical Exam   Constitutional: She is oriented to person, place, and time. She appears well-developed and well-nourished. No distress.   HENT:   Head: Normocephalic and atraumatic.   Mouth/Throat: Oropharynx is clear and moist.   Eyes: Conjunctivae and EOM are normal. Pupils are equal, round, and reactive to light. No scleral icterus.   Neck: Neck supple. JVD present. No tracheal deviation present.   Cardiovascular: Normal rate and regular rhythm. Exam reveals no gallop and no friction rub.   Murmur heard.  Pulmonary/Chest: Effort normal. No respiratory distress. She has decreased breath sounds in the right lower field and the left lower field. She has no wheezes. She has rales in the right lower field and the left lower field. She exhibits no tenderness.   Abdominal: Soft. Bowel sounds are normal. She exhibits no distension. There is no hepatosplenomegaly. There is no tenderness.   Musculoskeletal: She exhibits no edema or tenderness.   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   Skin: Skin is warm and dry. No rash noted. No erythema.   Psychiatric: She has a normal mood and affect. Her behavior is normal.       Significant Labs:   BMP:   Recent Labs   Lab 01/23/19  0810   *      K 3.2*      CO2 18*   BUN 24*   CREATININE 1.2   CALCIUM 8.8    and CBC   Recent Labs   Lab 01/23/19  0847   WBC 7.97   HGB 7.8*   HCT 25.0*
